# Patient Record
Sex: FEMALE | Race: WHITE | NOT HISPANIC OR LATINO | Employment: PART TIME | URBAN - METROPOLITAN AREA
[De-identification: names, ages, dates, MRNs, and addresses within clinical notes are randomized per-mention and may not be internally consistent; named-entity substitution may affect disease eponyms.]

---

## 2017-02-07 RX ORDER — LISINOPRIL AND HYDROCHLOROTHIAZIDE 12.5; 1 MG/1; MG/1
TABLET ORAL
Qty: 90 TAB | Refills: 0 | Status: SHIPPED | OUTPATIENT
Start: 2017-02-07 | End: 2017-07-07 | Stop reason: SDUPTHER

## 2017-02-07 NOTE — TELEPHONE ENCOUNTER
Was the patient seen in the last year in this department? No     Does patient have an active prescription for medications requested? No     Received Request Via: Pharmacy      Pt met protocol?: No pt last ov 12/2016   BP Readings from Last 1 Encounters:   12/01/16 110/72

## 2017-03-16 ENCOUNTER — HOSPITAL ENCOUNTER (OUTPATIENT)
Dept: LAB | Facility: MEDICAL CENTER | Age: 52
End: 2017-03-16
Attending: SPECIALIST
Payer: MEDICARE

## 2017-03-16 LAB
ALBUMIN SERPL BCP-MCNC: 4.3 G/DL (ref 3.2–4.9)
ALP SERPL-CCNC: 72 U/L (ref 30–99)
ALT SERPL-CCNC: 24 U/L (ref 2–50)
AST SERPL-CCNC: 22 U/L (ref 12–45)
BASOPHILS # BLD AUTO: 0.04 K/UL (ref 0–0.12)
BASOPHILS NFR BLD AUTO: 0.7 % (ref 0–1.8)
BILIRUB CONJ SERPL-MCNC: <0.1 MG/DL (ref 0.1–0.5)
BILIRUB INDIRECT SERPL-MCNC: NORMAL MG/DL (ref 0–1)
BILIRUB SERPL-MCNC: 0.4 MG/DL (ref 0.1–1.5)
EOSINOPHIL # BLD: 0.3 K/UL (ref 0–0.51)
EOSINOPHIL NFR BLD AUTO: 5.3 % (ref 0–6.9)
ERYTHROCYTE [DISTWIDTH] IN BLOOD BY AUTOMATED COUNT: 45.3 FL (ref 35.9–50)
HCT VFR BLD AUTO: 38.5 % (ref 37–47)
HGB BLD-MCNC: 12.8 G/DL (ref 12–16)
IMM GRANULOCYTES # BLD AUTO: 0.01 K/UL (ref 0–0.11)
IMM GRANULOCYTES NFR BLD AUTO: 0.2 % (ref 0–0.9)
LYMPHOCYTES # BLD: 1.3 K/UL (ref 1–4.8)
LYMPHOCYTES NFR BLD AUTO: 22.8 % (ref 22–41)
MCH RBC QN AUTO: 31.2 PG (ref 27–33)
MCHC RBC AUTO-ENTMCNC: 33.2 G/DL (ref 33.6–35)
MCV RBC AUTO: 93.9 FL (ref 81.4–97.8)
MONOCYTES # BLD: 0.38 K/UL (ref 0–0.85)
MONOCYTES NFR BLD AUTO: 6.7 % (ref 0–13.4)
NEUTROPHILS # BLD: 3.67 K/UL (ref 2–7.15)
NEUTROPHILS NFR BLD AUTO: 64.3 % (ref 44–72)
NRBC # BLD AUTO: 0 K/UL
NRBC BLD-RTO: 0 /100 WBC
PLATELET # BLD AUTO: 257 K/UL (ref 164–446)
PMV BLD AUTO: 11 FL (ref 9–12.9)
PROT SERPL-MCNC: 7.1 G/DL (ref 6–8.2)
RBC # BLD AUTO: 4.1 M/UL (ref 4.2–5.4)
WBC # BLD AUTO: 5.7 K/UL (ref 4.8–10.8)

## 2017-03-16 PROCEDURE — 85025 COMPLETE CBC W/AUTO DIFF WBC: CPT

## 2017-03-16 PROCEDURE — 80076 HEPATIC FUNCTION PANEL: CPT

## 2017-03-16 PROCEDURE — 36415 COLL VENOUS BLD VENIPUNCTURE: CPT

## 2017-05-19 ENCOUNTER — HOSPITAL ENCOUNTER (OUTPATIENT)
Dept: LAB | Facility: MEDICAL CENTER | Age: 52
End: 2017-05-19
Attending: SPECIALIST
Payer: MEDICARE

## 2017-05-19 LAB
ALBUMIN SERPL BCP-MCNC: 4.2 G/DL (ref 3.2–4.9)
ALBUMIN/GLOB SERPL: 1.4 G/DL
ALP SERPL-CCNC: 65 U/L (ref 30–99)
ALT SERPL-CCNC: 25 U/L (ref 2–50)
ANION GAP SERPL CALC-SCNC: 6 MMOL/L (ref 0–11.9)
AST SERPL-CCNC: 22 U/L (ref 12–45)
BASOPHILS # BLD AUTO: 0.9 % (ref 0–1.8)
BASOPHILS # BLD: 0.05 K/UL (ref 0–0.12)
BILIRUB SERPL-MCNC: 0.6 MG/DL (ref 0.1–1.5)
BUN SERPL-MCNC: 18 MG/DL (ref 8–22)
CALCIUM SERPL-MCNC: 9.2 MG/DL (ref 8.5–10.5)
CHLORIDE SERPL-SCNC: 104 MMOL/L (ref 96–112)
CHOLEST SERPL-MCNC: 209 MG/DL (ref 100–199)
CO2 SERPL-SCNC: 29 MMOL/L (ref 20–33)
CREAT SERPL-MCNC: 0.85 MG/DL (ref 0.5–1.4)
EOSINOPHIL # BLD AUTO: 0.38 K/UL (ref 0–0.51)
EOSINOPHIL NFR BLD: 6.8 % (ref 0–6.9)
ERYTHROCYTE [DISTWIDTH] IN BLOOD BY AUTOMATED COUNT: 44.1 FL (ref 35.9–50)
FERRITIN SERPL-MCNC: 51 NG/ML (ref 10–291)
GFR SERPL CREATININE-BSD FRML MDRD: >60 ML/MIN/1.73 M 2
GLOBULIN SER CALC-MCNC: 3 G/DL (ref 1.9–3.5)
GLUCOSE SERPL-MCNC: 105 MG/DL (ref 65–99)
HCT VFR BLD AUTO: 38 % (ref 37–47)
HDLC SERPL-MCNC: 85 MG/DL
HGB BLD-MCNC: 12.7 G/DL (ref 12–16)
IMM GRANULOCYTES # BLD AUTO: 0.01 K/UL (ref 0–0.11)
IMM GRANULOCYTES NFR BLD AUTO: 0.2 % (ref 0–0.9)
IRON SERPL-MCNC: 101 UG/DL (ref 40–170)
LDLC SERPL CALC-MCNC: 103 MG/DL
LYMPHOCYTES # BLD AUTO: 1.02 K/UL (ref 1–4.8)
LYMPHOCYTES NFR BLD: 18.2 % (ref 22–41)
MCH RBC QN AUTO: 31.4 PG (ref 27–33)
MCHC RBC AUTO-ENTMCNC: 33.4 G/DL (ref 33.6–35)
MCV RBC AUTO: 94.1 FL (ref 81.4–97.8)
MONOCYTES # BLD AUTO: 0.47 K/UL (ref 0–0.85)
MONOCYTES NFR BLD AUTO: 8.4 % (ref 0–13.4)
NEUTROPHILS # BLD AUTO: 3.68 K/UL (ref 2–7.15)
NEUTROPHILS NFR BLD: 65.5 % (ref 44–72)
NRBC # BLD AUTO: 0 K/UL
NRBC BLD AUTO-RTO: 0 /100 WBC
PLATELET # BLD AUTO: 228 K/UL (ref 164–446)
PMV BLD AUTO: 11.5 FL (ref 9–12.9)
POTASSIUM SERPL-SCNC: 3.7 MMOL/L (ref 3.6–5.5)
PROT SERPL-MCNC: 7.2 G/DL (ref 6–8.2)
RBC # BLD AUTO: 4.04 M/UL (ref 4.2–5.4)
SODIUM SERPL-SCNC: 139 MMOL/L (ref 135–145)
TRIGL SERPL-MCNC: 103 MG/DL (ref 0–149)
WBC # BLD AUTO: 5.6 K/UL (ref 4.8–10.8)

## 2017-05-19 PROCEDURE — 80061 LIPID PANEL: CPT

## 2017-05-19 PROCEDURE — 82728 ASSAY OF FERRITIN: CPT

## 2017-05-19 PROCEDURE — 85025 COMPLETE CBC W/AUTO DIFF WBC: CPT

## 2017-05-19 PROCEDURE — 80053 COMPREHEN METABOLIC PANEL: CPT

## 2017-05-19 PROCEDURE — 83540 ASSAY OF IRON: CPT

## 2017-05-19 PROCEDURE — 36415 COLL VENOUS BLD VENIPUNCTURE: CPT

## 2017-06-09 ENCOUNTER — PATIENT MESSAGE (OUTPATIENT)
Dept: MEDICAL GROUP | Facility: PHYSICIAN GROUP | Age: 52
End: 2017-06-09

## 2017-06-20 RX ORDER — LEVOTHYROXINE SODIUM 0.12 MG/1
TABLET ORAL
Qty: 90 TAB | Refills: 0 | Status: SHIPPED | OUTPATIENT
Start: 2017-06-20 | End: 2017-09-23 | Stop reason: SDUPTHER

## 2017-07-07 RX ORDER — LISINOPRIL AND HYDROCHLOROTHIAZIDE 12.5; 1 MG/1; MG/1
TABLET ORAL
Qty: 90 TAB | Refills: 0 | Status: SHIPPED | OUTPATIENT
Start: 2017-07-07 | End: 2017-09-06 | Stop reason: SDUPTHER

## 2017-07-07 NOTE — TELEPHONE ENCOUNTER
Was the patient seen in the last year in this department? Yes 12/01/16    Does patient have an active prescription for medications requested? No     Received Request Via: Pharmacy

## 2017-07-24 ENCOUNTER — HOSPITAL ENCOUNTER (OUTPATIENT)
Dept: LAB | Facility: MEDICAL CENTER | Age: 52
End: 2017-07-24
Attending: OBSTETRICS & GYNECOLOGY
Payer: MEDICARE

## 2017-07-24 LAB
BASOPHILS # BLD AUTO: 0.4 % (ref 0–1.8)
BASOPHILS # BLD: 0.02 K/UL (ref 0–0.12)
EOSINOPHIL # BLD AUTO: 0.26 K/UL (ref 0–0.51)
EOSINOPHIL NFR BLD: 5.2 % (ref 0–6.9)
ERYTHROCYTE [DISTWIDTH] IN BLOOD BY AUTOMATED COUNT: 43.5 FL (ref 35.9–50)
ESTRADIOL SERPL-MCNC: <20 PG/ML
FSH SERPL-ACNC: 60.7 MIU/ML
HCT VFR BLD AUTO: 38 % (ref 37–47)
HGB BLD-MCNC: 12.4 G/DL (ref 12–16)
IMM GRANULOCYTES # BLD AUTO: 0.03 K/UL (ref 0–0.11)
IMM GRANULOCYTES NFR BLD AUTO: 0.6 % (ref 0–0.9)
LH SERPL-ACNC: 36 IU/L
LYMPHOCYTES # BLD AUTO: 1.01 K/UL (ref 1–4.8)
LYMPHOCYTES NFR BLD: 20.4 % (ref 22–41)
MCH RBC QN AUTO: 30.5 PG (ref 27–33)
MCHC RBC AUTO-ENTMCNC: 32.6 G/DL (ref 33.6–35)
MCV RBC AUTO: 93.6 FL (ref 81.4–97.8)
MONOCYTES # BLD AUTO: 0.46 K/UL (ref 0–0.85)
MONOCYTES NFR BLD AUTO: 9.3 % (ref 0–13.4)
NEUTROPHILS # BLD AUTO: 3.18 K/UL (ref 2–7.15)
NEUTROPHILS NFR BLD: 64.1 % (ref 44–72)
NRBC # BLD AUTO: 0 K/UL
NRBC BLD AUTO-RTO: 0 /100 WBC
PLATELET # BLD AUTO: 236 K/UL (ref 164–446)
PMV BLD AUTO: 11.5 FL (ref 9–12.9)
PROGEST SERPL-MCNC: 0.1 NG/ML
RBC # BLD AUTO: 4.06 M/UL (ref 4.2–5.4)
T4 FREE SERPL-MCNC: 1.07 NG/DL (ref 0.53–1.43)
TSH SERPL DL<=0.005 MIU/L-ACNC: 0.08 UIU/ML (ref 0.3–3.7)
WBC # BLD AUTO: 5 K/UL (ref 4.8–10.8)

## 2017-07-24 PROCEDURE — 84144 ASSAY OF PROGESTERONE: CPT

## 2017-07-24 PROCEDURE — 84270 ASSAY OF SEX HORMONE GLOBUL: CPT

## 2017-07-24 PROCEDURE — 84403 ASSAY OF TOTAL TESTOSTERONE: CPT

## 2017-07-24 PROCEDURE — 83002 ASSAY OF GONADOTROPIN (LH): CPT

## 2017-07-24 PROCEDURE — 82670 ASSAY OF TOTAL ESTRADIOL: CPT

## 2017-07-24 PROCEDURE — 84439 ASSAY OF FREE THYROXINE: CPT

## 2017-07-24 PROCEDURE — 36415 COLL VENOUS BLD VENIPUNCTURE: CPT

## 2017-07-24 PROCEDURE — 84443 ASSAY THYROID STIM HORMONE: CPT

## 2017-07-24 PROCEDURE — 85025 COMPLETE CBC W/AUTO DIFF WBC: CPT

## 2017-07-24 PROCEDURE — 83001 ASSAY OF GONADOTROPIN (FSH): CPT

## 2017-07-27 LAB
SHBG SERPL-SCNC: 29 NMOL/L (ref 30–135)
TESTOST FREE SERPL-MCNC: 2.3 PG/ML (ref 0.6–3.8)
TESTOST SERPL-MCNC: 13 NG/DL (ref 9–55)

## 2017-08-03 ENCOUNTER — PATIENT MESSAGE (OUTPATIENT)
Dept: MEDICAL GROUP | Facility: PHYSICIAN GROUP | Age: 52
End: 2017-08-03

## 2017-08-03 DIAGNOSIS — N95.1 PERIMENOPAUSAL: ICD-10-CM

## 2017-08-31 ENCOUNTER — TELEPHONE (OUTPATIENT)
Dept: MEDICAL GROUP | Facility: PHYSICIAN GROUP | Age: 52
End: 2017-08-31

## 2017-08-31 NOTE — TELEPHONE ENCOUNTER
ESTABLISHED PATIENT PRE-VISIT PLANNING     Note: Patient will not be contacted if there is no indication to call.     1.  Reviewed notes from the last few office visits within the medical group: Yes    2.  If any orders were placed at last visit or intended to be done for this visit (i.e. 6 mos follow-up), do we have Results/Consult Notes?        •  Labs - Labs ordered, completed and results are in chart.       •  Imaging - Imaging was not ordered at last office visit.       •  Referrals - Referral ordered, patient has NOT been seen.    3. Is this appointment scheduled as a Hospital Follow-Up? No    4.  Immunizations were updated in Epic using WebIZ?: Epic matches WebIZ       •  Web Iz Recommendations: FLU and TDAP    5.  Patient is due for the following Health Maintenance Topics:   Health Maintenance Due   Topic Date Due   • Annual Wellness Visit  1965   • IMM DTaP/Tdap/Td Vaccine (1 - Tdap) 03/11/1983   • COLON CANCER SCREENING ANNUAL FIT  03/11/2014   • PAP SMEAR  05/15/2015   • IMM INFLUENZA (1) 09/01/2017           6.  Patient was informed to arrive 15 min prior to their scheduled appointment and bring in their medication bottles.

## 2017-09-01 ENCOUNTER — OFFICE VISIT (OUTPATIENT)
Dept: MEDICAL GROUP | Facility: PHYSICIAN GROUP | Age: 52
End: 2017-09-01
Payer: MEDICARE

## 2017-09-01 VITALS
WEIGHT: 190 LBS | SYSTOLIC BLOOD PRESSURE: 120 MMHG | HEIGHT: 64 IN | BODY MASS INDEX: 32.44 KG/M2 | HEART RATE: 72 BPM | TEMPERATURE: 98.4 F | DIASTOLIC BLOOD PRESSURE: 84 MMHG | RESPIRATION RATE: 16 BRPM | OXYGEN SATURATION: 95 %

## 2017-09-01 DIAGNOSIS — M25.511 RIGHT SHOULDER PAIN, UNSPECIFIED CHRONICITY: ICD-10-CM

## 2017-09-01 DIAGNOSIS — E03.9 ACQUIRED HYPOTHYROIDISM: ICD-10-CM

## 2017-09-01 DIAGNOSIS — N95.0 POST-MENOPAUSAL BLEEDING: ICD-10-CM

## 2017-09-01 DIAGNOSIS — N95.9 MENOPAUSAL DISORDER: ICD-10-CM

## 2017-09-01 DIAGNOSIS — Z12.11 SCREENING FOR COLON CANCER: ICD-10-CM

## 2017-09-01 DIAGNOSIS — E66.9 OBESITY (BMI 30-39.9): ICD-10-CM

## 2017-09-01 PROCEDURE — 99214 OFFICE O/P EST MOD 30 MIN: CPT | Performed by: FAMILY MEDICINE

## 2017-09-01 NOTE — PROGRESS NOTES
Chief Complaint   Patient presents with   • Follow-Up     labs   • Shoulder Pain     rt shoulder pain       HISTORY OF PRESENT ILLNESS: Patient is a 52 y.o. female established patient here today for the following concerns:    1. Right shoulder pain, unspecified chronicity  Abdias is here today with a new complaint of right shoulder pain that started last week after she was helping a neighbor with the ladder. She reports that it shifted and she felt a strain or pop in her shoulder, and since then has had difficulty raising the right arm without pain. She then reports that she's had a couple of movements what she was trying to unscrew apart from her vacuum and developed shoulder pain. Small movements tend to make it worse. She denies any redness or swelling. No numbness or tingling.    2. Acquired hypothyroidism  Patient has history of hypothyroidism, currently on levothyroxine. She reports she's been having increasing hot flashes although she is experiencing menopausal hot flashes as well.  Due for recheck on her labs.    3. Menopausal disorder  6. Post-menopausal bleeding    Patient found to have very low estrogen levels, especially drinking hot flashes and mood swings, insomnia. She would like to talk about options to treat it. Unfortunately she has been undergoing workup for postmenopausal bleeding with thickened endometrium. She failed to establish rapport with her gynecologist, Dr. Wynne and is here to talk about her options. She did have Pap smear which is normal. They were talking about endometrial biopsy after an office transabdominal ultrasound showed thickened endometrial stripe. She reports since her visit she has not had any further bleeding. No pelvic pain    4. Obesity (BMI 30-39.9)  Patient reports she's having some difficulty with weight gain, she has started to exercise a bit more. She is hoping to change her diet to produce some weight loss.    5. Screening for colon cancer  Due for colon cancer  screening          Past Medical, Social, and Family history reviewed and updated in EPIC    Allergies:Review of patient's allergies indicates no known allergies.    Current Outpatient Prescriptions   Medication Sig Dispense Refill   • lisinopril-hydrochlorothiazide (PRINZIDE, ZESTORETIC) 10-12.5 MG per tablet TAKE ONE TABLET BY MOUTH ONCE DAILY 90 Tab 0   • levothyroxine (SYNTHROID) 125 MCG Tab TAKE ONE TABLET BY MOUTH ONCE DAILY 90 Tab 0   • XELJANZ 5 MG Tab Take 1 Tab by mouth 2 Times a Day.  1   • vitamin D (CHOLECALCIFEROL) 1000 UNIT Tab Take 1,000 Units by mouth every day.     • B Complex Vitamins (VITAMIN B COMPLEX) Tab Take  by mouth.     • predniSONE (DELTASONE) 20 MG TABS Take 5 mg by mouth every day. Take with food     • folic acid (FOLVITE) 1 MG TABS Take 1 mg by mouth every day.     • sulfaSALAzine (AZULFIDINE) 500 MG TABS Take 1,000 mg by mouth 2 times a day. 1500 mg in AM and 1000 mg in PM     • FLUVIRIN Suspension      • methotrexate (TREXALL) 2.5 MG TABS Take 17.5 mg by mouth every 7 days.     • Naproxen Sodium (ALEVE) 220 MG CAPS Take  by mouth.     • hydroxychloroquine (PLAQUINIL) 200 MG TABS Take 400 mg by mouth every day.       No current facility-administered medications for this visit.          ROS:  Review of Systems   Constitutional: Negative for fever, chills, weight loss and malaise/fatigue.   HENT: Negative for ear pain, nosebleeds, congestion, sore throat and neck pain.    Eyes: Negative for blurred vision.   Respiratory: Negative for cough, sputum production, shortness of breath and wheezing.    Cardiovascular: Negative for chest pain, palpitations,  and leg swelling.   Gastrointestinal: Negative for heartburn, nausea, vomiting, diarrhea and abdominal pain.   Genitourinary: Negative for dysuria, urgency and frequency.   Musculoskeletal: Negative for myalgias, back pain and joint pain.   Skin: Negative for rash and itching.   Neurological: Negative for dizziness, tingling, tremors,  "sensory change, focal weakness and headaches.   Endo/Heme/Allergies: Does not bruise/bleed easily.   Psychiatric/Behavioral: Negative for depression, anxiety, suicidal ideas, insomnia and memory loss.      Exam:  Blood pressure 120/84, pulse 72, temperature 36.9 °C (98.4 °F), resp. rate 16, height 1.626 m (5' 4.02\"), weight 86.2 kg (190 lb), SpO2 95 %.    General:  Well nourished, well developed in NAD  Head is grossly normal.  Neck: Supple without JVD   Pulmonary:  Normal effort.   Cardiovascular: Regular rate  Extremities: no clubbing, cyanosis, or edema.  Psych: affect appropriate  MSK: Right shoulder with painful range of motion, hesitancy on lowering the shoulder. Unable to get full exam on rotator cuff due to pain.    Please note that this dictation was created using voice recognition software. I have made every reasonable attempt to correct obvious errors, but I expect that there are errors of grammar and possibly content that I did not discover before finalizing the note.    Assessment/Plan:  1. Right shoulder pain, unspecified chronicity  Suspect rotator cuff impingement, strain of the rotator cuff or microtear. At this time she will ice use nonsteroidal anti-inflammatory medications as tolerated, home exercise rehab given. If not improving will obtain MRI to evaluate the rotator cuff.    2. Acquired hypothyroidism  Continue current dose check levels  - TSH WITH REFLEX TO FT4; Future    3. Menopausal disorder  Discussed risks and benefits and options for treatment of postmenopausal symptoms. At this time would recommend something such as brisdelle or low-dose Paxil.    4. Obesity (BMI 30-39.9)  - Patient identified as having weight management issue.  Appropriate orders and counseling given.    5. Screening for colon cancer  - OCCULT BLOOD FECES IMMUNOASSAY (FIT); Future    6. Post-menopausal bleeding  Repeat transvaginal ultrasound, if endometrial stripe is still thickened, suggest we do either endometrial " biopsy or D&C and will require new referral to gynecology.  - US-PELVIC TRANSABDOMINAL; Future    Follow-up in one month sooner when necessary

## 2017-09-01 NOTE — LETTER
[facility] include [facility] beds at the [facility]  Phoebe Lancaster M.D.  [address]  [place] [address]  Fax: [phone]   Authorization for Release/Disclosure of   Protected Health Information   Name: RUBINA HORTON : 1965 SSN: [id number]   Address: [address] [place] [address]  [place] [address] Phone:    [phone] (home)    I authorize the entity listed below to release/disclose the PHI below to:   [facility]/Phoebe Lancaster M.D. and Phoebe Lancaster M.D.   Provider or Entity Name:Maria Ines Wynne   Address   [facility]   Phone:[phone]    Fax:[phone]     Reason for request: continuity of care   Information to be released:    [  ] LAST COLONOSCOPY,  including any PATH REPORT and follow-up  [  ] LAST FIT/COLOGUARD RESULT [  ] LAST DEXA  [  ] LAST MAMMOGRAM  [ X ] LAST PAP  [  ] LAST LABS [  ] RETINA EXAM REPORT  [  ] IMMUNIZATION RECORDS  [  ] Release all info          DATES OF SERVICE OR TIME PERIOD TO BE DISCLOSED: _____________  I understand and acknowledge that:  * This Authorization may be revoked at any time by you in writing, except if your health information has already been used or disclosed.  * Your health information that will be used or disclosed as a result of you signing this authorization could be re-disclosed by the recipient. If this occurs, your re-disclosed health information may no longer be protected by State or Federal laws.  * You may refuse to sign this Authorization. Your refusal will not affect your ability to obtain treatment.  * This Authorization becomes effective upon signing and will  on (date) __________.      If no date is indicated, this Authorization will  one (1) year from the signature date.    Name: Rubina Horton       Date: 2017       PLEASE FAX REQUESTED RECORDS BACK TO: [phone]

## 2017-09-06 RX ORDER — LISINOPRIL AND HYDROCHLOROTHIAZIDE 12.5; 1 MG/1; MG/1
TABLET ORAL
Qty: 90 TAB | Refills: 0 | Status: SHIPPED | OUTPATIENT
Start: 2017-09-06 | End: 2017-12-03 | Stop reason: SDUPTHER

## 2017-09-08 ENCOUNTER — TELEPHONE (OUTPATIENT)
Dept: MEDICAL GROUP | Facility: PHYSICIAN GROUP | Age: 52
End: 2017-09-08

## 2017-09-08 ENCOUNTER — HOSPITAL ENCOUNTER (OUTPATIENT)
Dept: RADIOLOGY | Facility: MEDICAL CENTER | Age: 52
End: 2017-09-08
Attending: FAMILY MEDICINE
Payer: MEDICARE

## 2017-09-08 DIAGNOSIS — N95.9 MENOPAUSAL DISORDER: ICD-10-CM

## 2017-09-08 DIAGNOSIS — N95.0 POST-MENOPAUSAL BLEEDING: ICD-10-CM

## 2017-09-08 PROCEDURE — 76856 US EXAM PELVIC COMPLETE: CPT

## 2017-09-08 RX ORDER — PAROXETINE 7.5 MG/1
1 CAPSULE ORAL DAILY
Qty: 90 CAP | Refills: 3 | Status: SHIPPED | OUTPATIENT
Start: 2017-09-08 | End: 2018-03-26

## 2017-09-08 NOTE — TELEPHONE ENCOUNTER
1. Caller Name: Abdias SrRupalAnamariajs                                           Call Back Number: [phone] (home)         Patient approves a detailed voicemail message: N\A    Pt states at her last appointment medication for menopause was discussed.  She is asking for rx sent to [facility] if possible.

## 2017-09-11 ENCOUNTER — TELEPHONE (OUTPATIENT)
Dept: MEDICAL GROUP | Facility: PHYSICIAN GROUP | Age: 52
End: 2017-09-11

## 2017-09-11 RX ORDER — PAROXETINE 10 MG/1
10 TABLET, FILM COATED ORAL DAILY
Qty: 90 TAB | Refills: 3 | Status: SHIPPED | OUTPATIENT
Start: 2017-09-11 | End: 2018-04-25

## 2017-09-11 NOTE — TELEPHONE ENCOUNTER
----- Message from Phoebe Lancaster M.D. sent at 9/10/2017  8:43 PM PDT -----  The uterine lining is thick.  We need consultation with gynecology for possible biopsy and/or dilation and curettage.  Referral had already been submitted, please call to schedule consultation with gynecology.

## 2017-09-12 ENCOUNTER — HOSPITAL ENCOUNTER (OUTPATIENT)
Dept: LAB | Facility: MEDICAL CENTER | Age: 52
End: 2017-09-12
Attending: FAMILY MEDICINE
Payer: MEDICARE

## 2017-09-12 ENCOUNTER — HOSPITAL ENCOUNTER (OUTPATIENT)
Dept: LAB | Facility: MEDICAL CENTER | Age: 52
End: 2017-09-12
Attending: SPECIALIST
Payer: MEDICARE

## 2017-09-12 DIAGNOSIS — E03.9 ACQUIRED HYPOTHYROIDISM: ICD-10-CM

## 2017-09-12 LAB
ALBUMIN SERPL BCP-MCNC: 4.3 G/DL (ref 3.2–4.9)
ALBUMIN/GLOB SERPL: 1.4 G/DL
ALP SERPL-CCNC: 69 U/L (ref 30–99)
ALT SERPL-CCNC: 24 U/L (ref 2–50)
ANION GAP SERPL CALC-SCNC: 10 MMOL/L (ref 0–11.9)
AST SERPL-CCNC: 19 U/L (ref 12–45)
BASOPHILS # BLD AUTO: 0.5 % (ref 0–1.8)
BASOPHILS # BLD: 0.03 K/UL (ref 0–0.12)
BILIRUB SERPL-MCNC: 0.7 MG/DL (ref 0.1–1.5)
BUN SERPL-MCNC: 15 MG/DL (ref 8–22)
CALCIUM SERPL-MCNC: 10 MG/DL (ref 8.5–10.5)
CHLORIDE SERPL-SCNC: 104 MMOL/L (ref 96–112)
CO2 SERPL-SCNC: 26 MMOL/L (ref 20–33)
CREAT SERPL-MCNC: 0.82 MG/DL (ref 0.5–1.4)
EOSINOPHIL # BLD AUTO: 0.37 K/UL (ref 0–0.51)
EOSINOPHIL NFR BLD: 6.7 % (ref 0–6.9)
ERYTHROCYTE [DISTWIDTH] IN BLOOD BY AUTOMATED COUNT: 43.5 FL (ref 35.9–50)
EST. AVERAGE GLUCOSE BLD GHB EST-MCNC: 105 MG/DL
GFR SERPL CREATININE-BSD FRML MDRD: >60 ML/MIN/1.73 M 2
GLOBULIN SER CALC-MCNC: 3.1 G/DL (ref 1.9–3.5)
GLUCOSE SERPL-MCNC: 103 MG/DL (ref 65–99)
HBA1C MFR BLD: 5.3 % (ref 0–5.6)
HCT VFR BLD AUTO: 39 % (ref 37–47)
HGB BLD-MCNC: 13.1 G/DL (ref 12–16)
IMM GRANULOCYTES # BLD AUTO: 0.02 K/UL (ref 0–0.11)
IMM GRANULOCYTES NFR BLD AUTO: 0.4 % (ref 0–0.9)
LYMPHOCYTES # BLD AUTO: 0.72 K/UL (ref 1–4.8)
LYMPHOCYTES NFR BLD: 12.9 % (ref 22–41)
MCH RBC QN AUTO: 31.1 PG (ref 27–33)
MCHC RBC AUTO-ENTMCNC: 33.6 G/DL (ref 33.6–35)
MCV RBC AUTO: 92.6 FL (ref 81.4–97.8)
MONOCYTES # BLD AUTO: 0.47 K/UL (ref 0–0.85)
MONOCYTES NFR BLD AUTO: 8.5 % (ref 0–13.4)
NEUTROPHILS # BLD AUTO: 3.95 K/UL (ref 2–7.15)
NEUTROPHILS NFR BLD: 71 % (ref 44–72)
NRBC # BLD AUTO: 0 K/UL
NRBC BLD AUTO-RTO: 0 /100 WBC
PLATELET # BLD AUTO: 237 K/UL (ref 164–446)
PMV BLD AUTO: 11.3 FL (ref 9–12.9)
POTASSIUM SERPL-SCNC: 3.9 MMOL/L (ref 3.6–5.5)
PROT SERPL-MCNC: 7.4 G/DL (ref 6–8.2)
RBC # BLD AUTO: 4.21 M/UL (ref 4.2–5.4)
SODIUM SERPL-SCNC: 140 MMOL/L (ref 135–145)
TSH SERPL DL<=0.005 MIU/L-ACNC: 0.44 UIU/ML (ref 0.3–3.7)
WBC # BLD AUTO: 5.6 K/UL (ref 4.8–10.8)

## 2017-09-12 PROCEDURE — 83036 HEMOGLOBIN GLYCOSYLATED A1C: CPT

## 2017-09-12 PROCEDURE — 84443 ASSAY THYROID STIM HORMONE: CPT

## 2017-09-12 PROCEDURE — 85025 COMPLETE CBC W/AUTO DIFF WBC: CPT

## 2017-09-12 PROCEDURE — 80053 COMPREHEN METABOLIC PANEL: CPT

## 2017-09-12 PROCEDURE — 36415 COLL VENOUS BLD VENIPUNCTURE: CPT

## 2017-09-15 ENCOUNTER — OFFICE VISIT (OUTPATIENT)
Dept: MEDICAL GROUP | Facility: PHYSICIAN GROUP | Age: 52
End: 2017-09-15
Payer: MEDICARE

## 2017-09-15 VITALS
TEMPERATURE: 97.9 F | RESPIRATION RATE: 16 BRPM | WEIGHT: 191 LBS | HEART RATE: 64 BPM | HEIGHT: 64 IN | SYSTOLIC BLOOD PRESSURE: 128 MMHG | BODY MASS INDEX: 32.61 KG/M2 | DIASTOLIC BLOOD PRESSURE: 86 MMHG | OXYGEN SATURATION: 95 %

## 2017-09-15 DIAGNOSIS — E03.9 HYPOTHYROIDISM (ACQUIRED): ICD-10-CM

## 2017-09-15 DIAGNOSIS — N95.9 POSTMENOPAUSAL SYMPTOMS: ICD-10-CM

## 2017-09-15 DIAGNOSIS — N85.00 ENDOMETRIAL HYPERPLASIA: ICD-10-CM

## 2017-09-15 PROCEDURE — 99214 OFFICE O/P EST MOD 30 MIN: CPT | Performed by: FAMILY MEDICINE

## 2017-09-15 NOTE — PROGRESS NOTES
Chief Complaint   Patient presents with   • Follow-Up     labs, ultrasound   • Medication Management     paroxetine       HISTORY OF PRESENT ILLNESS: Patient is a 52 y.o. female established patient here today for the following concerns:    Patient is here with menopausal symptoms of hot flashes and mood swings, night sweats and trouble concentrating. She also has history of endometrial hyperplasia and is here with her partner to discuss whether she needs additional workup. It is been our recommendation that she move forward with evaluation gynecology for possible dilation and curettage versus endometrial biopsy. She is highly hesitant to have any sort of procedure done. She is here today to discuss the reasons why this might be necessary. She denies any pelvic pains, no abnormal bleeding. Her endometrial stripe did go from 0.77-1.0 in the course of several weeks although this was done by different ultrasonographer's and different techniques. She has yet to start the Paxil for postmenopausal symptoms, and would like to discuss this further as well.      She did also have some additional lab testing done which revealed that her thyroid is now euthyroid with her current dosing.       Past Medical, Social, and Family history reviewed and updated in EPIC    Allergies:Review of patient's allergies indicates no known allergies.    Current Outpatient Prescriptions   Medication Sig Dispense Refill   • paroxetine (PAXIL) 10 MG Tab Take 1 Tab by mouth every day. 90 Tab 3   • PARoxetine Mesylate (BRISDELLE) 7.5 MG Cap Take 1 Tab by mouth every day. 90 Cap 3   • lisinopril-hydrochlorothiazide (PRINZIDE, ZESTORETIC) 10-12.5 MG per tablet TAKE ONE TABLET BY MOUTH ONCE DAILY 90 Tab 0   • levothyroxine (SYNTHROID) 125 MCG Tab TAKE ONE TABLET BY MOUTH ONCE DAILY 90 Tab 0   • FLUVIRIN Suspension      • XELJANZ 5 MG Tab Take 1 Tab by mouth 2 Times a Day.  1   • vitamin D (CHOLECALCIFEROL) 1000 UNIT Tab Take 1,000 Units by mouth every  "day.     • B Complex Vitamins (VITAMIN B COMPLEX) Tab Take  by mouth.     • methotrexate (TREXALL) 2.5 MG TABS Take 17.5 mg by mouth every 7 days.     • Naproxen Sodium (ALEVE) 220 MG CAPS Take  by mouth.     • predniSONE (DELTASONE) 20 MG TABS Take 5 mg by mouth every day. Take with food     • folic acid (FOLVITE) 1 MG TABS Take 1 mg by mouth every day.     • sulfaSALAzine (AZULFIDINE) 500 MG TABS Take 1,000 mg by mouth 2 times a day. 1500 mg in AM and 1000 mg in PM     • hydroxychloroquine (PLAQUINIL) 200 MG TABS Take 400 mg by mouth every day.       No current facility-administered medications for this visit.          ROS:  Review of Systems   Constitutional: Negative for fever, chills, weight loss and malaise/fatigue.   HENT: Negative for ear pain, nosebleeds, congestion, sore throat and neck pain.    Eyes: Negative for blurred vision.   Respiratory: Negative for cough, sputum production, shortness of breath and wheezing.    Cardiovascular: Negative for chest pain, palpitations,  and leg swelling.   Gastrointestinal: Negative for heartburn, nausea, vomiting, diarrhea and abdominal pain.   Genitourinary: Negative for dysuria, urgency and frequency.   Musculoskeletal: Negative for myalgias, back pain and joint pain.   Skin: Negative for rash and itching.   Neurological: Negative for dizziness, tingling, tremors, sensory change, focal weakness and headaches.   Endo/Heme/Allergies: Does not bruise/bleed easily.   Psychiatric/Behavioral: Negative for depression, anxiety, suicidal ideas, insomnia and memory loss.      Exam:  Blood pressure 128/86, pulse 64, temperature 36.6 °C (97.9 °F), resp. rate 16, height 1.626 m (5' 4.02\"), weight 86.6 kg (191 lb), SpO2 95 %.    General:  Well nourished, well developed in NAD  Head is grossly normal.  Neck: Supple without JVD   Pulmonary:  Normal effort.   Cardiovascular: Regular rate  Extremities: no clubbing, cyanosis, or edema.  Psych: affect appropriate    Please note that " this dictation was created using voice recognition software. I have made every reasonable attempt to correct obvious errors, but I expect that there are errors of grammar and possibly content that I did not discover before finalizing the note.    Assessment/Plan:  1. Endometrial hyperplasia  Patient needs additional workup to rule out malignancy, referral someplace to gynecology. Discussion today on why we feel that this is important. At this time she is amendable to at least have a consultation to discuss options for treatment.  2. Postmenopausal vasomotor motor symptoms  Discussed at this time that we are unable to use any hormones until we figure out what is going on with her uterine lining. For the time being given her significantly dysfunction with the hormonal changes we will have her start Paxil 10 mg daily.  3. Hypothyroidism  Continue current dosing recheck labs in 3-6 months

## 2017-09-25 RX ORDER — LEVOTHYROXINE SODIUM 0.12 MG/1
TABLET ORAL
Qty: 90 TAB | Refills: 1 | Status: SHIPPED | OUTPATIENT
Start: 2017-09-25 | End: 2017-12-30 | Stop reason: SDUPTHER

## 2017-09-26 ENCOUNTER — OFFICE VISIT (OUTPATIENT)
Dept: MEDICAL GROUP | Facility: CLINIC | Age: 52
End: 2017-09-26
Payer: MEDICARE

## 2017-09-26 VITALS
HEIGHT: 64 IN | BODY MASS INDEX: 32.44 KG/M2 | TEMPERATURE: 98 F | HEART RATE: 78 BPM | OXYGEN SATURATION: 95 % | RESPIRATION RATE: 16 BRPM | WEIGHT: 190 LBS | SYSTOLIC BLOOD PRESSURE: 108 MMHG | DIASTOLIC BLOOD PRESSURE: 68 MMHG

## 2017-09-26 DIAGNOSIS — M25.511 ACUTE PAIN OF RIGHT SHOULDER: ICD-10-CM

## 2017-09-26 PROCEDURE — 99213 OFFICE O/P EST LOW 20 MIN: CPT | Performed by: NURSE PRACTITIONER

## 2017-09-26 ASSESSMENT — ENCOUNTER SYMPTOMS
FALLS: 0
SENSORY CHANGE: 0
TINGLING: 0

## 2017-09-26 ASSESSMENT — PATIENT HEALTH QUESTIONNAIRE - PHQ9: CLINICAL INTERPRETATION OF PHQ2 SCORE: 0

## 2017-09-26 NOTE — PROGRESS NOTES
Chief Complaint   Patient presents with   • Shoulder Pain     Right shoulder x 1 month /       HISTORY OF PRESENT ILLNESS: Patient is a 52 y.o. female established patient who presents today due to one day hx of acute right shoulder pain. Pt reports that she hurts herself about a month ago from helping neighbor moving the ladder. After that, she went to her PCP for check up and was instructed to do some arm swing exercise and prn NSAID. Her symptoms does improve afterward until yesterday that she was scared by her cat so that she have abruptly fast arm rasing movement and she feels the pain again. She started using ice packing and she did take aleve, both of which have been helping her. She is able to raise her arm, normal passive ROM even though that is hurting. She has negative empty an test but it hurts.       Patient Active Problem List    Diagnosis Date Noted   • Endometrial hyperplasia 09/15/2017   • Menopausal disorder 09/01/2017   • Obesity (BMI 30-39.9) 12/01/2016   • Anxiety 08/07/2013   • Hypertension 09/22/2010   • Rheumatoid arthritis ([facility]) 05/27/2010   • Hypothyroid 05/27/2010       Allergies:Review of patient's allergies indicates no known allergies.    Current Outpatient Prescriptions   Medication Sig Dispense Refill   • levothyroxine (SYNTHROID) 125 MCG Tab TAKE ONE TABLET BY MOUTH ONCE DAILY 90 Tab 1   • paroxetine (PAXIL) 10 MG Tab Take 1 Tab by mouth every day. 90 Tab 3   • lisinopril-hydrochlorothiazide (PRINZIDE, ZESTORETIC) 10-12.5 MG per tablet TAKE ONE TABLET BY MOUTH ONCE DAILY 90 Tab 0   • XELJANZ 5 MG Tab Take 1 Tab by mouth 2 Times a Day.  1   • vitamin D (CHOLECALCIFEROL) 1000 UNIT Tab Take 1,000 Units by mouth every day.     • B Complex Vitamins (VITAMIN B COMPLEX) Tab Take  by mouth.     • Naproxen Sodium (ALEVE) 220 MG CAPS Take  by mouth.     • predniSONE (DELTASONE) 20 MG TABS Take 5 mg by mouth every day. Take with food     • folic acid (FOLVITE) 1 MG TABS Take 1 mg by mouth  "every day.     • sulfaSALAzine (AZULFIDINE) 500 MG TABS Take 1,000 mg by mouth 2 times a day. 1500 mg in AM and 1000 mg in PM     • PARoxetine Mesylate (BRISDELLE) 7.5 MG Cap Take 1 Tab by mouth every day. 90 Cap 3   • FLUVIRIN Suspension      • methotrexate (TREXALL) 2.5 MG TABS Take 17.5 mg by mouth every 7 days.     • hydroxychloroquine (PLAQUINIL) 200 MG TABS Take 400 mg by mouth every day.       No current facility-administered medications for this visit.        Social History   Substance Use Topics   • Smoking status: Never Smoker   • Smokeless tobacco: Never Used   • Alcohol use No      Comment: 1-2 a month       Family Status   Relation Status   • Mother Alive   • Father     not close with father   • Maternal Grandmother    • Maternal Grandfather    • Neg Hx      Family History   Problem Relation Age of Onset   • Hypertension Mother    • Thyroid Mother    • Heart Disease Maternal Grandmother    • Heart Disease Maternal Grandfather    • Diabetes Neg Hx    • Cancer Neg Hx          ROS:  Review of Systems   Musculoskeletal: Positive for joint pain ( shoulder pain). Negative for falls.   Neurological: Negative for tingling and sensory change.        Objective:     Blood pressure 108/68, pulse 78, temperature 36.7 °C (98 °F), resp. rate 16, height 1.626 m (5' 4\"), weight 86.2 kg (190 lb), SpO2 95 %.     Physical Exam:  Physical Exam   Constitutional: She is oriented to person, place, and time and well-developed, well-nourished, and in no distress.   HENT:   Head: Normocephalic and atraumatic.   Eyes: Conjunctivae are normal.   Neck: Neck supple. No JVD present.   Cardiovascular: Normal rate.    Pulmonary/Chest: No respiratory distress.   Abdominal: Soft.   Musculoskeletal: She exhibits tenderness ( supraspinatus to bicep tendon area). She exhibits no edema.   Neurological: She is alert and oriented to person, place, and time.   Skin: Skin is warm.   Vitals reviewed.        Assessment/Plan:  1. Acute pain of right " shoulder  - tendonitis, partial rotator cuff tear vs. Impingement. Since ice packing and Aleve are working and negative empty cane test at this time, will continue medical treatment first. However, since this is her second time having injury to the same place, will consider MRI if pt has not felt better in next 7-14 days.    Differential diagnoses and indications for immediate follow-up discussed with patient.    Instructed to return to clinic or nearest emergency department for any change in condition, further concerns, or worsening of symptoms.    The patient demonstrated a good understanding and agreed with the treatment plan.

## 2017-12-05 RX ORDER — LISINOPRIL AND HYDROCHLOROTHIAZIDE 12.5; 1 MG/1; MG/1
1 TABLET ORAL DAILY
Qty: 90 TAB | Refills: 1 | Status: SHIPPED | OUTPATIENT
Start: 2017-12-05 | End: 2018-07-08 | Stop reason: SDUPTHER

## 2017-12-05 NOTE — TELEPHONE ENCOUNTER
Was the patient seen in the last year in this department? Yes     Does patient have an active prescription for medications requested? No     Received Request Via: Patient      Pt met protocol?: Yes, OV 9/17   BP Readings from Last 1 Encounters:   09/26/17 108/68

## 2017-12-05 NOTE — TELEPHONE ENCOUNTER
Refill X 6 months, sent to pharmacy.Pt. Seen in the last 6 months per protocol.   Lab Results   Component Value Date/Time    SODIUM 140 09/12/2017 08:25 AM    POTASSIUM 3.9 09/12/2017 08:25 AM    CHLORIDE 104 09/12/2017 08:25 AM    CO2 26 09/12/2017 08:25 AM    GLUCOSE 103 (H) 09/12/2017 08:25 AM    BUN 15 09/12/2017 08:25 AM    CREATININE 0.82 09/12/2017 08:25 AM

## 2018-01-02 ENCOUNTER — GYNECOLOGY VISIT (OUTPATIENT)
Dept: OBGYN | Facility: MEDICAL CENTER | Age: 53
End: 2018-01-02
Payer: MEDICARE

## 2018-01-02 VITALS
SYSTOLIC BLOOD PRESSURE: 140 MMHG | WEIGHT: 189 LBS | BODY MASS INDEX: 32.27 KG/M2 | DIASTOLIC BLOOD PRESSURE: 80 MMHG | HEIGHT: 64 IN

## 2018-01-02 DIAGNOSIS — N95.0 PMB (POSTMENOPAUSAL BLEEDING): ICD-10-CM

## 2018-01-02 PROCEDURE — 99214 OFFICE O/P EST MOD 30 MIN: CPT | Performed by: OBSTETRICS & GYNECOLOGY

## 2018-01-02 RX ORDER — LEVOTHYROXINE SODIUM 0.12 MG/1
TABLET ORAL
Qty: 90 TAB | Refills: 2 | Status: SHIPPED | OUTPATIENT
Start: 2018-01-02 | End: 2018-05-24 | Stop reason: SDUPTHER

## 2018-01-02 NOTE — PROGRESS NOTES
"Chief Complaint   Patient presents with   • Other     NEW GYN CONSULT       History of present illness:   52 y.o. presents with postmenopausal bleeding  She reports that her periods had been regular every month until her late 40s  She had no periods x 8-9 months until she started spotting in March 2017 then bled again in Sept 2017 x 3 weeks. She had a TVS, which she hardly tolerated that showed ES 1.07 cm  Denies abnormal pap, no vaginal dryness  No pelvic pains. (+) female partner  Occasional EMILY - doesn't bother her  G0, never smoker  Denies family history of breast/ovarian cancer    Review of systems:  Pertinent positives documented in HPI and all other systems reviewed & are negative      All PMH, PSH, allergies, social history and FH reviewed and updated today:  Past Medical History:   Diagnosis Date   • Arthritis    • Hypertension 9/22/2010   • Hypothyroid 5/27/2010       Past Surgical History:   Procedure Laterality Date   • TUMOR EXCISION WITH BIOPSY  2005    R thigh; benign   • OTHER ORTHOPEDIC SURGERY  1990    L ankle        Allergies: No Known Allergies    Social History     Social History   • Marital status: Single     Spouse name: N/A   • Number of children: N/A   • Years of education: N/A     Occupational History   • Not on file.     Social History Main Topics   • Smoking status: Never Smoker   • Smokeless tobacco: Never Used   • Alcohol use No      Comment: 1-2 a month   • Drug use:      Types: Marijuana   • Sexual activity: Not Currently     Partners: Female      Comment: works inside [employer]      Other Topics Concern   • Not on file     Social History Narrative   • No narrative on file       Family History   Problem Relation Age of Onset   • Hypertension Mother    • Thyroid Mother    • Heart Disease Maternal Grandmother    • Heart Disease Maternal Grandfather    • Diabetes Neg Hx    • Cancer Neg Hx        Physical exam:  Blood pressure 140/80, height 1.626 m (5' 4\"), weight 85.7 kg (189 lb), last " menstrual period 12/09/2018, not currently breastfeeding.    General:appears stated age, is in no apparent distress, is well developed and well nourished  Psychiatric: Patient shows appropriate affect, is alert and oriented x3, intact judgment and insight.  1. PMB (postmenopausal bleeding)  REFERRAL TO GYNECOLOGY   2.  Pt explained about PMB and thickened ES, her risk factors associated being G0 and increased BMI for endometrial hyperplasia/endometrial cancer. Recommend hysteroscopy D&C. Pt could not tolerate office gyn procedure.  3. All questions addressed with Rachid on speaker phone - plan explained  4. Spent  25 minutes in face-to-face patient contact in which greater than 50% of that visit was spent in counseling/coordination of care

## 2018-01-05 ENCOUNTER — APPOINTMENT (OUTPATIENT)
Dept: PHYSICAL THERAPY | Facility: REHABILITATION | Age: 53
End: 2018-01-05
Attending: SPECIALIST
Payer: MEDICARE

## 2018-01-10 ENCOUNTER — APPOINTMENT (OUTPATIENT)
Dept: PHYSICAL THERAPY | Facility: REHABILITATION | Age: 53
End: 2018-01-10
Attending: SPECIALIST
Payer: MEDICARE

## 2018-01-12 ENCOUNTER — APPOINTMENT (OUTPATIENT)
Dept: PHYSICAL THERAPY | Facility: REHABILITATION | Age: 53
End: 2018-01-12
Attending: SPECIALIST
Payer: MEDICARE

## 2018-01-12 ENCOUNTER — HOSPITAL ENCOUNTER (OUTPATIENT)
Dept: LAB | Facility: MEDICAL CENTER | Age: 53
End: 2018-01-12
Attending: SPECIALIST
Payer: MEDICARE

## 2018-01-12 LAB
25(OH)D3 SERPL-MCNC: 29 NG/ML (ref 30–100)
ALBUMIN SERPL BCP-MCNC: 4.4 G/DL (ref 3.2–4.9)
ALP SERPL-CCNC: 83 U/L (ref 30–99)
ALT SERPL-CCNC: 25 U/L (ref 2–50)
AST SERPL-CCNC: 20 U/L (ref 12–45)
BASOPHILS # BLD AUTO: 0.8 % (ref 0–1.8)
BASOPHILS # BLD: 0.04 K/UL (ref 0–0.12)
BILIRUB CONJ SERPL-MCNC: 0.1 MG/DL (ref 0.1–0.5)
BILIRUB INDIRECT SERPL-MCNC: 0.5 MG/DL (ref 0–1)
BILIRUB SERPL-MCNC: 0.6 MG/DL (ref 0.1–1.5)
EOSINOPHIL # BLD AUTO: 0.26 K/UL (ref 0–0.51)
EOSINOPHIL NFR BLD: 4.9 % (ref 0–6.9)
ERYTHROCYTE [DISTWIDTH] IN BLOOD BY AUTOMATED COUNT: 42.4 FL (ref 35.9–50)
HCT VFR BLD AUTO: 40.7 % (ref 37–47)
HGB BLD-MCNC: 13.5 G/DL (ref 12–16)
IMM GRANULOCYTES # BLD AUTO: 0.02 K/UL (ref 0–0.11)
IMM GRANULOCYTES NFR BLD AUTO: 0.4 % (ref 0–0.9)
LYMPHOCYTES # BLD AUTO: 1.11 K/UL (ref 1–4.8)
LYMPHOCYTES NFR BLD: 20.9 % (ref 22–41)
MCH RBC QN AUTO: 30.5 PG (ref 27–33)
MCHC RBC AUTO-ENTMCNC: 33.2 G/DL (ref 33.6–35)
MCV RBC AUTO: 92.1 FL (ref 81.4–97.8)
MONOCYTES # BLD AUTO: 0.39 K/UL (ref 0–0.85)
MONOCYTES NFR BLD AUTO: 7.3 % (ref 0–13.4)
NEUTROPHILS # BLD AUTO: 3.5 K/UL (ref 2–7.15)
NEUTROPHILS NFR BLD: 65.7 % (ref 44–72)
NRBC # BLD AUTO: 0 K/UL
NRBC BLD-RTO: 0 /100 WBC
PLATELET # BLD AUTO: 244 K/UL (ref 164–446)
PMV BLD AUTO: 10.9 FL (ref 9–12.9)
PROT SERPL-MCNC: 7.6 G/DL (ref 6–8.2)
RBC # BLD AUTO: 4.42 M/UL (ref 4.2–5.4)
WBC # BLD AUTO: 5.3 K/UL (ref 4.8–10.8)

## 2018-01-12 PROCEDURE — 36415 COLL VENOUS BLD VENIPUNCTURE: CPT

## 2018-01-12 PROCEDURE — 82306 VITAMIN D 25 HYDROXY: CPT

## 2018-01-12 PROCEDURE — 80076 HEPATIC FUNCTION PANEL: CPT

## 2018-01-12 PROCEDURE — 85025 COMPLETE CBC W/AUTO DIFF WBC: CPT

## 2018-01-16 ENCOUNTER — PATIENT MESSAGE (OUTPATIENT)
Dept: MEDICAL GROUP | Facility: PHYSICIAN GROUP | Age: 53
End: 2018-01-16

## 2018-01-17 ENCOUNTER — APPOINTMENT (OUTPATIENT)
Dept: PHYSICAL THERAPY | Facility: REHABILITATION | Age: 53
End: 2018-01-17
Attending: SPECIALIST
Payer: MEDICARE

## 2018-01-19 ENCOUNTER — APPOINTMENT (OUTPATIENT)
Dept: PHYSICAL THERAPY | Facility: REHABILITATION | Age: 53
End: 2018-01-19
Attending: SPECIALIST
Payer: MEDICARE

## 2018-01-24 ENCOUNTER — APPOINTMENT (OUTPATIENT)
Dept: PHYSICAL THERAPY | Facility: REHABILITATION | Age: 53
End: 2018-01-24
Attending: SPECIALIST
Payer: MEDICARE

## 2018-01-26 ENCOUNTER — APPOINTMENT (OUTPATIENT)
Dept: PHYSICAL THERAPY | Facility: REHABILITATION | Age: 53
End: 2018-01-26
Attending: SPECIALIST
Payer: MEDICARE

## 2018-01-31 ENCOUNTER — APPOINTMENT (OUTPATIENT)
Dept: PHYSICAL THERAPY | Facility: REHABILITATION | Age: 53
End: 2018-01-31
Attending: SPECIALIST
Payer: MEDICARE

## 2018-02-21 ENCOUNTER — TELEPHONE (OUTPATIENT)
Dept: MEDICAL GROUP | Facility: PHYSICIAN GROUP | Age: 53
End: 2018-02-21

## 2018-02-21 ENCOUNTER — PATIENT OUTREACH (OUTPATIENT)
Dept: HEALTH INFORMATION MANAGEMENT | Facility: OTHER | Age: 53
End: 2018-02-21

## 2018-02-21 DIAGNOSIS — Z12.11 SCREENING FOR COLON CANCER: ICD-10-CM

## 2018-02-21 NOTE — PROGRESS NOTES
1. Attempt #:Final    2. HealthConnect Verified: yes    3. Verify PCP: yes    4. Care Team Updated:       •   DME Company (gait device, O2, CPAP, etc.): NO       •   Other Specialists (eye doctor, derm, GYN, cardiology, endo, etc): NO    5.  Reviewed/Updated the following with patient:       •   Communication Preference Obtained? YES       •   Preferred Pharmacy? YES       •   Preferred Lab? YES       •   Family History (document living status of immediate family members and if + hx of cancer, diabetes, hypertension, hyperlipidemia, heart attack, stroke) YES. Was Abstract Encounter opened and chart updated? YES    6. [employer] Activation: already active    7. [employer] Una: yes    8. Annual Wellness Visit Scheduling  Scheduling Status:Scheduled      9. Care Gap Scheduling (Attempt to Schedule EACH Overdue Care Gap!)     Health Maintenance Due   Topic Date Due   • Annual Wellness Visit  1965   • IMM DTaP/Tdap/Td Vaccine (1 - Tdap) 03/11/1983   • COLON CANCER SCREENING ANNUAL FIT  03/11/2014// Mailed Fit Test   • MAMMOGRAM  12/05/2017// Will schedule later        Scheduled patient for Annual Wellness Visit    10. Patient was advised: “This is a free wellness visit. The provider will screen for medical conditions to help you stay healthy. If you have other concerns to address you may be asked to discuss these at a separate visit or there may be an additional fee.”     11. Patient was informed to arrive 15 min prior to their scheduled appointment and bring in their medication bottles.

## 2018-03-16 ENCOUNTER — TELEPHONE (OUTPATIENT)
Dept: MEDICAL GROUP | Facility: PHYSICIAN GROUP | Age: 53
End: 2018-03-16

## 2018-03-16 NOTE — TELEPHONE ENCOUNTER
ANNUAL WELLNESS VISIT PRE-VISIT PLANNING WITH OUTREACH    1.  Immunizations were updated in Epic using WebIZ?:Epic matches WebIZ       •  WebIZ Recommendations: TDAP       •  Is patient due for Tdap? YES. Patient was notified of copay/out of pocket cost.       •  Is patient due for Shingles?NO    2.  MDX printed and highlighted for Provider? YES

## 2018-03-22 ENCOUNTER — APPOINTMENT (OUTPATIENT)
Dept: ADMISSIONS | Facility: MEDICAL CENTER | Age: 53
End: 2018-03-22
Payer: MEDICARE

## 2018-03-26 ENCOUNTER — HOSPITAL ENCOUNTER (OUTPATIENT)
Dept: RADIOLOGY | Facility: MEDICAL CENTER | Age: 53
End: 2018-03-26
Attending: OBSTETRICS & GYNECOLOGY | Admitting: OBSTETRICS & GYNECOLOGY
Payer: MEDICARE

## 2018-03-26 DIAGNOSIS — Z01.810 PRE-OPERATIVE CARDIOVASCULAR EXAMINATION: ICD-10-CM

## 2018-03-26 DIAGNOSIS — Z01.812 PRE-OPERATIVE LABORATORY EXAMINATION: ICD-10-CM

## 2018-03-26 LAB
ANION GAP SERPL CALC-SCNC: 9 MMOL/L (ref 0–11.9)
APPEARANCE UR: CLEAR
BASOPHILS # BLD AUTO: 0.5 % (ref 0–1.8)
BASOPHILS # BLD: 0.03 K/UL (ref 0–0.12)
BILIRUB UR QL STRIP.AUTO: NEGATIVE
BUN SERPL-MCNC: 16 MG/DL (ref 8–22)
CALCIUM SERPL-MCNC: 9.4 MG/DL (ref 8.5–10.5)
CHLORIDE SERPL-SCNC: 100 MMOL/L (ref 96–112)
CO2 SERPL-SCNC: 27 MMOL/L (ref 20–33)
COLOR UR: YELLOW
CREAT SERPL-MCNC: 0.77 MG/DL (ref 0.5–1.4)
CULTURE IF INDICATED INDCX: NO UA CULTURE
EKG IMPRESSION: NORMAL
EOSINOPHIL # BLD AUTO: 0.31 K/UL (ref 0–0.51)
EOSINOPHIL NFR BLD: 5.6 % (ref 0–6.9)
ERYTHROCYTE [DISTWIDTH] IN BLOOD BY AUTOMATED COUNT: 45.1 FL (ref 35.9–50)
GLUCOSE SERPL-MCNC: 99 MG/DL (ref 65–99)
GLUCOSE UR STRIP.AUTO-MCNC: NEGATIVE MG/DL
HCG SERPL QL: NEGATIVE
HCT VFR BLD AUTO: 40.4 % (ref 37–47)
HGB BLD-MCNC: 13.4 G/DL (ref 12–16)
IMM GRANULOCYTES # BLD AUTO: 0.03 K/UL (ref 0–0.11)
IMM GRANULOCYTES NFR BLD AUTO: 0.5 % (ref 0–0.9)
KETONES UR STRIP.AUTO-MCNC: NEGATIVE MG/DL
LEUKOCYTE ESTERASE UR QL STRIP.AUTO: NEGATIVE
LYMPHOCYTES # BLD AUTO: 0.97 K/UL (ref 1–4.8)
LYMPHOCYTES NFR BLD: 17.5 % (ref 22–41)
MCH RBC QN AUTO: 31.7 PG (ref 27–33)
MCHC RBC AUTO-ENTMCNC: 33.2 G/DL (ref 33.6–35)
MCV RBC AUTO: 95.5 FL (ref 81.4–97.8)
MICRO URNS: NORMAL
MONOCYTES # BLD AUTO: 0.46 K/UL (ref 0–0.85)
MONOCYTES NFR BLD AUTO: 8.3 % (ref 0–13.4)
NEUTROPHILS # BLD AUTO: 3.74 K/UL (ref 2–7.15)
NEUTROPHILS NFR BLD: 67.6 % (ref 44–72)
NITRITE UR QL STRIP.AUTO: NEGATIVE
NRBC # BLD AUTO: 0 K/UL
NRBC BLD-RTO: 0 /100 WBC
PH UR STRIP.AUTO: 6.5 [PH]
PLATELET # BLD AUTO: 278 K/UL (ref 164–446)
PMV BLD AUTO: 10.8 FL (ref 9–12.9)
POTASSIUM SERPL-SCNC: 3.8 MMOL/L (ref 3.6–5.5)
PROT UR QL STRIP: NEGATIVE MG/DL
RBC # BLD AUTO: 4.23 M/UL (ref 4.2–5.4)
RBC UR QL AUTO: NEGATIVE
SODIUM SERPL-SCNC: 136 MMOL/L (ref 135–145)
SP GR UR STRIP.AUTO: 1.01
UROBILINOGEN UR STRIP.AUTO-MCNC: 0.2 MG/DL
WBC # BLD AUTO: 5.5 K/UL (ref 4.8–10.8)

## 2018-03-26 PROCEDURE — 81003 URINALYSIS AUTO W/O SCOPE: CPT

## 2018-03-26 PROCEDURE — 80048 BASIC METABOLIC PNL TOTAL CA: CPT

## 2018-03-26 PROCEDURE — 36415 COLL VENOUS BLD VENIPUNCTURE: CPT

## 2018-03-26 PROCEDURE — 93010 ELECTROCARDIOGRAM REPORT: CPT | Performed by: INTERNAL MEDICINE

## 2018-03-26 PROCEDURE — 85025 COMPLETE CBC W/AUTO DIFF WBC: CPT

## 2018-03-26 PROCEDURE — 84703 CHORIONIC GONADOTROPIN ASSAY: CPT

## 2018-03-26 PROCEDURE — 72040 X-RAY EXAM NECK SPINE 2-3 VW: CPT

## 2018-03-26 PROCEDURE — 93005 ELECTROCARDIOGRAM TRACING: CPT

## 2018-03-26 RX ORDER — CHOLECALCIFEROL (VITAMIN D3) 50 MCG
TABLET ORAL 2 TIMES DAILY
Status: ON HOLD | COMMUNITY
End: 2019-01-31

## 2018-03-27 ENCOUNTER — TELEPHONE (OUTPATIENT)
Dept: OBGYN | Facility: MEDICAL CENTER | Age: 53
End: 2018-03-27

## 2018-03-27 NOTE — TELEPHONE ENCOUNTER
Patient called and wants to know if she needs to stop her RA medications, her surgery is on Friday. Please call back.

## 2018-03-27 NOTE — TELEPHONE ENCOUNTER
Called and informed the patient that Dr. Asencio stated she just needs to stop taking her medication 10 hours before her surgery. Pt verbalizes understanding and states she will comply.

## 2018-03-27 NOTE — TELEPHONE ENCOUNTER
----- Message from Toni Yang sent at 3/26/2018 10:25 AM PDT -----  Regarding: Preop Rx questions  Contact: [phone]  Has ?s about Sulfasalazine and Xeljanz before her surgery on Friday. Can she take them all week? When she should stop?

## 2018-03-29 ENCOUNTER — GYNECOLOGY VISIT (OUTPATIENT)
Dept: OBGYN | Facility: MEDICAL CENTER | Age: 53
End: 2018-03-29
Payer: MEDICARE

## 2018-03-29 VITALS
HEIGHT: 64 IN | SYSTOLIC BLOOD PRESSURE: 120 MMHG | WEIGHT: 191.3 LBS | BODY MASS INDEX: 32.66 KG/M2 | DIASTOLIC BLOOD PRESSURE: 76 MMHG

## 2018-03-29 DIAGNOSIS — N95.0 PMB (POSTMENOPAUSAL BLEEDING): ICD-10-CM

## 2018-03-29 PROCEDURE — 99213 OFFICE O/P EST LOW 20 MIN: CPT | Performed by: OBSTETRICS & GYNECOLOGY

## 2018-03-29 RX ORDER — OXYCODONE HYDROCHLORIDE AND ACETAMINOPHEN 5; 325 MG/1; MG/1
1 TABLET ORAL EVERY 6 HOURS PRN
Qty: 10 TAB | Refills: 0 | Status: ON HOLD | OUTPATIENT
Start: 2018-03-29 | End: 2018-03-30

## 2018-03-30 ENCOUNTER — HOSPITAL ENCOUNTER (OUTPATIENT)
Facility: MEDICAL CENTER | Age: 53
End: 2018-03-30
Attending: OBSTETRICS & GYNECOLOGY | Admitting: OBSTETRICS & GYNECOLOGY
Payer: MEDICARE

## 2018-03-30 VITALS
HEIGHT: 64 IN | TEMPERATURE: 97.3 F | BODY MASS INDEX: 32.26 KG/M2 | DIASTOLIC BLOOD PRESSURE: 71 MMHG | SYSTOLIC BLOOD PRESSURE: 161 MMHG | HEART RATE: 60 BPM | OXYGEN SATURATION: 100 % | WEIGHT: 188.93 LBS | RESPIRATION RATE: 19 BRPM

## 2018-03-30 PROCEDURE — 88305 TISSUE EXAM BY PATHOLOGIST: CPT

## 2018-03-30 PROCEDURE — A9270 NON-COVERED ITEM OR SERVICE: HCPCS

## 2018-03-30 PROCEDURE — 700102 HCHG RX REV CODE 250 W/ 637 OVERRIDE(OP)

## 2018-03-30 PROCEDURE — 700101 HCHG RX REV CODE 250

## 2018-03-30 PROCEDURE — 160035 HCHG PACU - 1ST 60 MINS PHASE I: Performed by: OBSTETRICS & GYNECOLOGY

## 2018-03-30 PROCEDURE — 700111 HCHG RX REV CODE 636 W/ 250 OVERRIDE (IP)

## 2018-03-30 PROCEDURE — 160048 HCHG OR STATISTICAL LEVEL 1-5: Performed by: OBSTETRICS & GYNECOLOGY

## 2018-03-30 PROCEDURE — 501394 HCHG SOLUTION SORBITOL 3% 3000ML BAG: Performed by: OBSTETRICS & GYNECOLOGY

## 2018-03-30 PROCEDURE — 160036 HCHG PACU - EA ADDL 30 MINS PHASE I: Performed by: OBSTETRICS & GYNECOLOGY

## 2018-03-30 PROCEDURE — 160002 HCHG RECOVERY MINUTES (STAT): Performed by: OBSTETRICS & GYNECOLOGY

## 2018-03-30 PROCEDURE — 160029 HCHG SURGERY MINUTES - 1ST 30 MINS LEVEL 4: Performed by: OBSTETRICS & GYNECOLOGY

## 2018-03-30 PROCEDURE — 500448 HCHG DRESSING, TELFA 3X4: Performed by: OBSTETRICS & GYNECOLOGY

## 2018-03-30 PROCEDURE — 160041 HCHG SURGERY MINUTES - EA ADDL 1 MIN LEVEL 4: Performed by: OBSTETRICS & GYNECOLOGY

## 2018-03-30 PROCEDURE — 502587 HCHG PACK, D&C: Performed by: OBSTETRICS & GYNECOLOGY

## 2018-03-30 PROCEDURE — 160009 HCHG ANES TIME/MIN: Performed by: OBSTETRICS & GYNECOLOGY

## 2018-03-30 RX ORDER — OXYCODONE HCL 5 MG/5 ML
SOLUTION, ORAL ORAL
Status: COMPLETED
Start: 2018-03-30 | End: 2018-03-30

## 2018-03-30 RX ORDER — SODIUM CHLORIDE, SODIUM LACTATE, POTASSIUM CHLORIDE, CALCIUM CHLORIDE 600; 310; 30; 20 MG/100ML; MG/100ML; MG/100ML; MG/100ML
INJECTION, SOLUTION INTRAVENOUS CONTINUOUS
Status: DISCONTINUED | OUTPATIENT
Start: 2018-03-30 | End: 2018-03-30 | Stop reason: HOSPADM

## 2018-03-30 RX ADMIN — FENTANYL CITRATE 25 MCG: 50 INJECTION, SOLUTION INTRAMUSCULAR; INTRAVENOUS at 12:25

## 2018-03-30 RX ADMIN — FENTANYL CITRATE 50 MCG: 50 INJECTION, SOLUTION INTRAMUSCULAR; INTRAVENOUS at 12:40

## 2018-03-30 RX ADMIN — OXYCODONE HYDROCHLORIDE 10 MG: 5 SOLUTION ORAL at 12:20

## 2018-03-30 ASSESSMENT — PAIN SCALES - GENERAL
PAINLEVEL_OUTOF10: 5
PAINLEVEL_OUTOF10: 0
PAINLEVEL_OUTOF10: 5
PAINLEVEL_OUTOF10: 7
PAINLEVEL_OUTOF10: 0
PAINLEVEL_OUTOF10: 0

## 2018-03-30 NOTE — OR SURGEON
Immediate Post OP Note    PreOp Diagnosis: PMB                                Unable to tolerate office pelvic exam    PostOp Diagnosis: same    Procedure(s):  HYSTEROSCOPY WITH VIDEO DIAGNOSTIC - Wound Class: Clean Contaminated  PELVIC EXAM UNDER ANESTHESIA - Wound Class: Clean Contaminated  DILATION AND CURETTAGE - Wound Class: Clean Contaminated    Surgeon(s):  Tamera Arias M.D.    Anesthesiologist/Type of Anesthesia:  Anesthesiologist: Yoselin Rashid M.D./General    Surgical Staff:  Circulator: Phillip Fitzpatrick R.N.; Kandice Dangelo R.N.  Relief Circulator: Gayathri Patel R.N.  Relief Scrub: Natalia Mendes  Scrub Person: Cuca Mortensen    Specimens removed if any:  Endometrial curettings and polyp    Estimated Blood Loss: 50 cc    Findings: uterus is not enlarged no adnexal masses                  Cervix is stenotic                  (+) thickened endometrial lining and polypoid structures seen    Complications: none    3/30/2018 12:11 PM Tamera Arias M.D.

## 2018-03-30 NOTE — PROGRESS NOTES
Chief Complaint   Patient presents with   • Pre-op Exam             HISTORY AND PHYSICAL     PRE-OP DIAGNOSIS:   1. POST-MENOPAUSAL BLEEDING   2. UNABLE TO TOLERATE OFFICE PELVIC EXAM  PROPOSED SURGICAL PROCEDURE:   1. PELVIC EXAM UNDER ANESTHESIA   2. DIAGNOSTIC HYSTEROSCOPY   3. DILATATION AND CURETTAGE    History of present illness:   53 y.o. G0 with PMB presents today to discuss above surgery  She came to gyn office in Jan because of heavy vaginal bleeding episode 3 months prior, 3 weeks duration, after not having periods for 8-9 months  TVS was ordered that showed ES = 1.07 cm  Denies pelvic pains  No family history of breast/ovarian cancer    Review of systems:  Pertinent positives documented in HPI and all other systems reviewed & are negative    All PMH, PSH, allergies, social history and FH reviewed and updated today:  Past Medical History:   Diagnosis Date   • Anemia     H/O   • Arthritis     RA   • Asthma    • Heart burn    • Heart murmur    • High cholesterol     no meds   • Hypertension 9/22/2010   • Hypothyroid 5/27/2010   • Pain 03/2018    RA pain   • Rheumatoid arthritis (CMS-HCC)        Past Surgical History:   Procedure Laterality Date   • TUMOR EXCISION WITH BIOPSY  2005    R thigh; benign   • OTHER ORTHOPEDIC SURGERY  1990    L ankle cyst   • OTHER      repairs from going thru sliding glass at age 4   • OTHER ORTHOPEDIC SURGERY      left hip bone scrape       Allergies: No Known Allergies    Social History     Social History   • Marital status: Single     Spouse name: N/A   • Number of children: N/A   • Years of education: N/A     Occupational History   • Not on file.     Social History Main Topics   • Smoking status: Never Smoker   • Smokeless tobacco: Never Used   • Alcohol use No      Comment: 0-1 a month   • Drug use: Yes     Types: Marijuana, Inhaled      Comment: marijuana PRN (cocaine crank last 1988)   • Sexual activity: Not Currently     Partners: Female      Comment: works inside  [employer]      Other Topics Concern   • Not on file     Social History Narrative   • No narrative on file       Family History   Problem Relation Age of Onset   • Hypertension Mother    • Thyroid Mother    • Heart Disease Mother      Heart Attack   • Heart Disease Maternal Grandmother    • Heart Disease Maternal Grandfather    • Diabetes Neg Hx    • Cancer Neg Hx        Physical exam:  Blood pressure 120/76, height 1.626 m (5' 4\"), weight 86.8 kg (191 lb 4.8 oz), last menstrual period 03/06/2018, not currently breastfeeding.    General:appears stated age, is in no apparent distress, is well developed and well nourished  Head: normocephalic, non-tender  Neck: neck is supple  CV : regular rate and rhythm, no peripheral edema  Lungs: Normal respiratory effort. Clear to auscultation bilaterally  Abdomen: Bowel sounds positive, nondistended, soft, nontender x4, no rebound or guarding. No organomegaly. No masses.  Female GYN:deferred   Skin: No rashes, or ulcers or lesions seen  Psychiatric: Patient shows appropriate affect, is alert and oriented x3, intact judgment and insight.  1. PMB (postmenopausal bleeding)  Consent for Surgery / Procedure    oxyCODONE-acetaminophen (PERCOCET) 5-325 MG Tab    CONSENT FOR OPIATE PRESCRIPTION   2. Complete discussion regarding the proposed procedure was conducted both today and throughout the entire danielle-operative period. The procedure:    1. PELVIC EXAM UNDER ANESTHESIA   2. DIAGNOSTIC HYSTEROSCOPY   3. DILATATION AND CURETTAGE  Was specifically addressed. There is increased risk from  any surgical procedure related to  Anesthesia, increased risk of infection, both abdominal and wound infections as well as heavy bleeding, both during surgery, or delayed bleeding. These were discussed as well.      Patient made aware of risk  of Uterine perforation, infection and Hemorrhage.   Patient made aware that cervical dilatation and intra uterine instruments will be used to allow visualization " of the uterine cavity. Uterine perforation, injury to the bowels and bladder and intra-abdominal organs.   Possible hysterectomy and exploratory laparotomy as a life saving procedure to control hemorrhage and repair of injured bowels or bladder. Above complications can happen but uncommon.   Patient was given the opportunity to ask questions that were answered to stated satisfaction  Pre-op instructions given and reviewed with her  Percocet 5.325 # 10 script provided.

## 2018-03-30 NOTE — OP REPORT
DATE OF SERVICE:  03/30/2018    PREOPERATIVE DIAGNOSES:  1.  Postmenopausal bleeding.  2.  Unable to tolerate office pelvic exam     SURGICAL PROCEDURES:  1.  Pelvic exam under anesthesia.  2.  Diagnostic hysteroscopy.  3.  Dilatation and curettage.    SURGEON:  Tamera Arias MD    ASSISTANT:  None.    ANESTHESIOLOGIST:  Yoselin Rashid MD    ANESTHESIA:  General anesthesia.    SPECIMEN:  Endometrial curetting and polyp.    ESTIMATED BLOOD LOSS:  50 mL.    FINDINGS:  Uterus is not enlarged. No adnexal masses palpated.  Cervix is stenotic. On  diagnostic hysteroscopy, noted is thickened endometrial lining and polypoid structures   seen suggestive of polyps    COMPLICATIONS:  None.    PATIENT CONDITION:  Stable.    DESCRIPTION OF PROCEDURE:  Patient and family were discussed about the risks,   indications, benefits, alternatives of the procedure, they were agreeable to   proceed.  Informed consent signed.    Patient was then taken to the operating room and with IV running and placed in  supine position.  General anesthesia was given without difficulty.  Patient   was then placed in a lithotomy position, prepped and draped in the normal   usual sterile fashion.  The bladder was emptied of clear urine using straight   catheter.  Formal time-out was then done and preoperative antibiotics given.    Speculum examination under anesthesia was then performed, which revealed above  findings.  Pediatric speculum was inserted with good visualization of   the cervix was noted to be stenotic.  The cervix was then serially dilated   using Hegar dilator to accommodate 8 mm hysteroscope.  Diagnostic hysteroscopy  was then activated with water as distending medium with good visualization of  the uterine cavity with the findings noted above.  Hysteroscope was then   withdrawn and sharp curettage was then done to obtain adequate endometrial   curettings as well as polypoid structures obtained.  Specimen was sent to    pathology.  The diagnostic hysteroscopy was then again performed and inserted   gently with good visualization of the endometrial cavity.  There was no   perforation noted.  There was no active bleeding noted as well.  At this   point, the procedure was then terminated.  All instruments were removed.  The   cervix was excellently hemostatic.  The patient had some small abrasion   at the vaginal introitus and the bleeding was controlled by means of a Bovie   cautery.  No other complications noted, patient was sent to the PACU awake and in  stable condition.       ____________________________________     AMBER HOLLOWAY. STA. MD CLIVE ANAND / AARON    DD:  03/30/2018 12:19:37  DT:  03/30/2018 12:40:43    D#:  [id number]  Job#:  [id number]

## 2018-03-30 NOTE — DISCHARGE INSTRUCTIONS
ACTIVITY: Rest and take it easy for the first 24 hours.  A responsible adult is recommended to remain with you during that time.  It is normal to feel sleepy.  We encourage you to not do anything that requires balance, judgment or coordination.    MILD FLU-LIKE SYMPTOMS ARE NORMAL. YOU MAY EXPERIENCE GENERALIZED MUSCLE ACHES, THROAT IRRITATION, HEADACHE AND/OR SOME NAUSEA.    FOR 24 HOURS DO NOT:  Drive, operate machinery or run household appliances.  Drink beer or alcoholic beverages.   Make important decisions or sign legal documents.    SPECIAL INSTRUCTIONS: *Follow instructions from MD**    DIET: To avoid nausea, slowly advance diet as tolerated, avoiding spicy or greasy foods for the first day.  Add more substantial food to your diet according to your physician's instructions.  Babies can be fed formula or breast milk as soon as they are hungry.  INCREASE FLUIDS AND FIBER TO AVOID CONSTIPATION.    SURGICAL DRESSING/BATHING: **No tub baths, hot tubs, etc until approved by MD*    FOLLOW-UP APPOINTMENT:  A follow-up appointment should be arranged with your doctor in *2 weeks**; call to schedule.    You should CALL YOUR PHYSICIAN if you develop:  Fever greater than 101 degrees F.  Pain not relieved by medication, or persistent nausea or vomiting.  Excessive bleeding (blood soaking through dressing) or unexpected drainage from the wound.  Extreme redness or swelling around the incision site, drainage of pus or foul smelling drainage.  Inability to urinate or empty your bladder within 8 hours.  Problems with breathing or chest pain.    You should call 911 if you develop problems with breathing or chest pain.  If you are unable to contact your doctor or surgical center, you should go to the nearest emergency room or urgent care center.  Physician's telephone #: **[phone]*    If any questions arise, call your doctor.  If your doctor is not available, please feel free to call the Surgical Center at [phone].   The Center is open Monday through Friday from 7AM to 7PM.  You can also call the HEALTH HOTLINE open 24 hours/day, 7 days/week and speak to a nurse at [phone], or toll free at [phone].    A registered nurse may call you a few days after your surgery to see how you are doing after your procedure.    MEDICATIONS: Resume taking daily medication.  Take prescribed pain medication with food.  If no medication is prescribed, you may take non-aspirin pain medication if needed.  PAIN MEDICATION CAN BE VERY CONSTIPATING.  Take a stool softener or laxative such as senokot, pericolace, or milk of magnesia if needed.    Prescription given for *Percocet**.  Last pain medication given at *Oxycodone at 1220**.    If your physician has prescribed pain medication that includes Acetaminophen (Tylenol), do not take additional Acetaminophen (Tylenol) while taking the prescribed medication.    Depression / Suicide Risk    As you are discharged from this [facility] facility, it is important to learn how to keep safe from harming yourself.    Recognize the warning signs:  · Abrupt changes in personality, positive or negative- including increase in energy   · Giving away possessions  · Change in eating patterns- significant weight changes-  positive or negative  · Change in sleeping patterns- unable to sleep or sleeping all the time   · Unwillingness or inability to communicate  · Depression  · Unusual sadness, discouragement and loneliness  · Talk of wanting to die  · Neglect of personal appearance   · Rebelliousness- reckless behavior  · Withdrawal from people/activities they love  · Confusion- inability to concentrate     If you or a loved one observes any of these behaviors or has concerns about self-harm, here's what you can do:  · Talk about it- your feelings and reasons for harming yourself  · Remove any means that you might use to hurt yourself (examples: pills, rope, extension cords, firearm)  · Get professional  help from the community (Mental Health, Substance Abuse, psychological counseling)  · Do not be alone:Call your Safe Contact- someone whom you trust who will be there for you.  · Call your local CRISIS HOTLINE 155-3785 or 949-587-6509  · Call your local Children's Mobile Crisis Response Team [place] (513) 577-4671 or www.[employer]  · Call the toll free National Suicide Prevention Hotlines   · National Suicide Prevention Lifeline 883-957-ICOW (0522)  · National Hope Line Network 800-SUICIDE (233-2780)

## 2018-03-30 NOTE — PROGRESS NOTES
"1213 Pt transferred to PACU. Report received from OR RN and anesthesia. tions even and unlabored. Complains of pain 5/10 with cramping, medicated per MAR.  1240 Pt states \"my pain is worse\". Medicated per MAR.   1315 Pt up to get dressed, taken off of monitor.   1335 Pt and S.O. educated on discharge instructions. Verbalized understanding. All questions answered. All belongings are with patient. Pt discharged home. Escorted to car via wheelchair.    "

## 2018-04-04 ENCOUNTER — TELEPHONE (OUTPATIENT)
Dept: OBGYN | Facility: MEDICAL CENTER | Age: 53
End: 2018-04-04

## 2018-04-04 NOTE — TELEPHONE ENCOUNTER
Patient called stating she was having diarrhea at first after surgery but now her feces will not form. I explained that you were off your RA medications and regular medication for 5 days and has since Monday restarted. I explained your body will have to readjust to the medication which may be causing some of this. I told patient to give it until Friday and if it is not better I will ask the doctor for something. Pt states she will comply and has no further questions or concerns at this time.

## 2018-04-12 ENCOUNTER — GYNECOLOGY VISIT (OUTPATIENT)
Dept: OBGYN | Facility: MEDICAL CENTER | Age: 53
End: 2018-04-12
Payer: MEDICARE

## 2018-04-12 VITALS
HEIGHT: 64 IN | DIASTOLIC BLOOD PRESSURE: 78 MMHG | WEIGHT: 191 LBS | SYSTOLIC BLOOD PRESSURE: 125 MMHG | BODY MASS INDEX: 32.61 KG/M2

## 2018-04-12 DIAGNOSIS — Z09 POSTOP CHECK: ICD-10-CM

## 2018-04-12 PROCEDURE — 99024 POSTOP FOLLOW-UP VISIT: CPT | Performed by: OBSTETRICS & GYNECOLOGY

## 2018-04-12 NOTE — PROGRESS NOTES
Chief Complaint   Patient presents with   • Other     Post-op       History of present illness:   53 y.o. S/p 2 weeks hysteroscopy D&C presents for post op check  On Paxil 10 mg - feels sad, tearful, angry for last few days  No vaginal bleeding  No other complaints  Review of systems:  Pertinent positives documented in HPI and all other systems reviewed & are negative    All PMH, PSH, allergies, social history and FH reviewed and updated today:  Past Medical History:   Diagnosis Date   • Anemia     H/O   • Arthritis     RA   • Asthma    • Heart burn    • Heart murmur    • High cholesterol     no meds   • Hypertension 9/22/2010   • Hypothyroid 5/27/2010   • Pain 03/2018    RA pain   • Rheumatoid arthritis (CMS-[facility])        Past Surgical History:   Procedure Laterality Date   • HYSTEROSCOPY WITH VIDEO DIAGNOSTIC N/A 3/30/2018    Procedure: HYSTEROSCOPY WITH VIDEO DIAGNOSTIC;  Surgeon: Tamera Arias M.D.;  Location: SURGERY SAME DAY [facility] ORS;  Service: Obstetrics   • PELVIC EXAM UNDER ANESTHESIA N/A 3/30/2018    Procedure: PELVIC EXAM UNDER ANESTHESIA;  Surgeon: Tamera Arias M.D.;  Location: SURGERY SAME DAY [facility] ORS;  Service: Obstetrics   • DILATION AND CURETTAGE N/A 3/30/2018    Procedure: DILATION AND CURETTAGE;  Surgeon: Tamera Arias M.D.;  Location: SURGERY SAME DAY [facility] ORS;  Service: Obstetrics   • TUMOR EXCISION WITH BIOPSY  2005    R thigh; benign   • OTHER ORTHOPEDIC SURGERY  1990    L ankle cyst   • OTHER      repairs from going thru sliding glass at age 4   • OTHER ORTHOPEDIC SURGERY      left hip bone scrape       Allergies: No Known Allergies    Social History     Social History   • Marital status: Single     Spouse name: N/A   • Number of children: N/A   • Years of education: N/A     Occupational History   • Not on file.     Social History Main Topics   • Smoking status: Never Smoker   • Smokeless tobacco: Never Used   • Alcohol use No       "Comment: 0-1 a month   • Drug use: Yes     Types: Marijuana, Inhaled      Comment: marijuana PRN (cocaine crank last 1988)   • Sexual activity: Not Currently     Partners: Female      Comment: works inside [employer]      Other Topics Concern   • Not on file     Social History Narrative   • No narrative on file       Family History   Problem Relation Age of Onset   • Hypertension Mother    • Thyroid Mother    • Heart Disease Mother      Heart Attack   • Heart Disease Maternal Grandmother    • Heart Disease Maternal Grandfather    • Diabetes Neg Hx    • Cancer Neg Hx      Physical exam:  Blood pressure 125/78, height 1.626 m (5' 4\"), weight 86.6 kg (191 lb), last menstrual period 03/06/2018, not currently breastfeeding.    General:appears stated age, is in no apparent distress, is well developed and well nourished  Head: normocephalic, non-tender  Neck: neck is supple  Abdomen: Bowel sounds positive, nondistended, soft, nontender x4, no rebound or guarding. No organomegaly. No masses.  Skin: No rashes, or ulcers or lesions seen  Psychiatric: Patient shows appropriate affect, is alert and oriented x3, intact judgment and insight.  1. Postop check     2. She will continue to take paxil x 2-3 more weeks and see. She will call Dr Lancaster for annual appt  3. Doing well otherwise  4. Gyn PRN/annual   5. Benign pathology discussed with her. All questions addressed   "

## 2018-04-25 ENCOUNTER — OFFICE VISIT (OUTPATIENT)
Dept: MEDICAL GROUP | Facility: PHYSICIAN GROUP | Age: 53
End: 2018-04-25
Payer: MEDICARE

## 2018-04-25 VITALS
TEMPERATURE: 98.4 F | WEIGHT: 190.8 LBS | HEART RATE: 74 BPM | BODY MASS INDEX: 32.58 KG/M2 | HEIGHT: 64 IN | OXYGEN SATURATION: 94 % | RESPIRATION RATE: 16 BRPM | SYSTOLIC BLOOD PRESSURE: 128 MMHG | DIASTOLIC BLOOD PRESSURE: 82 MMHG

## 2018-04-25 DIAGNOSIS — Z12.11 SCREENING FOR COLON CANCER: ICD-10-CM

## 2018-04-25 DIAGNOSIS — Z12.39 SCREENING FOR BREAST CANCER: ICD-10-CM

## 2018-04-25 DIAGNOSIS — N95.9 MENOPAUSAL DISORDER: ICD-10-CM

## 2018-04-25 DIAGNOSIS — E03.9 ACQUIRED HYPOTHYROIDISM: ICD-10-CM

## 2018-04-25 PROCEDURE — 99214 OFFICE O/P EST MOD 30 MIN: CPT | Performed by: FAMILY MEDICINE

## 2018-04-25 RX ORDER — PAROXETINE HYDROCHLORIDE 20 MG/1
20 TABLET, FILM COATED ORAL DAILY
Qty: 90 TAB | Refills: 3 | Status: SHIPPED | OUTPATIENT
Start: 2018-04-25 | End: 2018-07-19 | Stop reason: SDUPTHER

## 2018-04-25 NOTE — PROGRESS NOTES
Chief Complaint   Patient presents with   • Menopause     hot flashes   • Hypothyroidism     labs       HISTORY OF PRESENT ILLNESS: Patient is a 53 y.o. female established patient here today for the following concerns:    1. Screening for breast cancer  Due for mammogram    2. Screening for colon cancer  Due for colon cancer screening, requesting FIT testing.     3. Acquired hypothyroidism  Continues on levothyroxine 125, due for recheck.  Increase in hot flashes and fatigue.    4. Menopausal disorder  Has been on paxil since D&C with benign pathology.  Hot flashes have been worse lately.  Requesting possible increase of change in medication.        Past Medical, Social, and Family history reviewed and updated in EPIC    Allergies:Patient has no known allergies.    Current Outpatient Prescriptions   Medication Sig Dispense Refill   • PARoxetine (PAXIL) 20 MG Tab Take 1 Tab by mouth every day. 90 Tab 3   • Cholecalciferol (VITAMIN D) 2000 UNIT Tab Take  by mouth 2 Times a Day.     • levothyroxine (SYNTHROID) 125 MCG Tab TAKE ONE TABLET BY MOUTH ONCE DAILY 90 Tab 2   • lisinopril-hydrochlorothiazide (PRINZIDE, ZESTORETIC) 10-12.5 MG per tablet Take 1 Tab by mouth every day. 90 Tab 1   • XELJANZ 5 MG Tab Take 1 Tab by mouth 2 Times a Day.  1   • B Complex Vitamins (VITAMIN B COMPLEX) Tab Take  by mouth.     • Naproxen Sodium (ALEVE) 220 MG CAPS Take  by mouth.     • folic acid (FOLVITE) 1 MG TABS Take 1 mg by mouth every day.     • sulfaSALAzine (AZULFIDINE) 500 MG TABS Take 1,000 mg by mouth 2 times a day.       No current facility-administered medications for this visit.          ROS:  Review of Systems   Constitutional: Negative for fever, chills, weight loss and malaise/fatigue.   HENT: Negative for ear pain, nosebleeds, congestion, sore throat and neck pain.    Eyes: Negative for blurred vision.   Respiratory: Negative for cough, sputum production, shortness of breath and wheezing.    Cardiovascular: Negative for  "chest pain, palpitations,  and leg swelling.   Gastrointestinal: Negative for heartburn, nausea, vomiting, diarrhea and abdominal pain.   Genitourinary: Negative for dysuria, urgency and frequency.   Musculoskeletal: Negative for myalgias, back pain and joint pain.   Skin: Negative for rash and itching.   Neurological: Negative for dizziness, tingling, tremors, sensory change, focal weakness and headaches.   Endo/Heme/Allergies: Does not bruise/bleed easily.   Psychiatric/Behavioral: Negative for depression, anxiety, suicidal ideas, insomnia and memory loss.      Exam:  Blood pressure 128/82, pulse 74, temperature 36.9 °C (98.4 °F), resp. rate 16, height 1.626 m (5' 4\"), weight 86.5 kg (190 lb 12.8 oz), last menstrual period 03/06/2018, SpO2 94 %.    General:  Well nourished, well developed in NAD  Head is grossly normal.  Neck: Supple without JVD   Pulmonary:  Normal effort.   Cardiovascular: Regular rate  Extremities: no clubbing, cyanosis, or edema.  Psych: affect appropriate      Please note that this dictation was created using voice recognition software. I have made every reasonable attempt to correct obvious errors, but I expect that there are errors of grammar and possibly content that I did not discover before finalizing the note.    Assessment/Plan:  1. Screening for breast cancer    - MA-SCREEN MAMMO W/CAD-BILAT; Future    2. Screening for colon cancer    - OCCULT BLOOD FECES IMMUNOASSAY; Future    3. Acquired hypothyroidism  Check levels and adjust accordingly.  continue meds.   - TSH; Future  - FREE THYROXINE; Future  - TRIIDOTHYRONINE; Future    4. Menopausal disorder  increase  - PARoxetine (PAXIL) 20 MG Tab; Take 1 Tab by mouth every day.  Dispense: 90 Tab; Refill: 3    Follow up in the next 3 months.         "

## 2018-04-27 ENCOUNTER — HOSPITAL ENCOUNTER (OUTPATIENT)
Dept: LAB | Facility: MEDICAL CENTER | Age: 53
End: 2018-04-27
Attending: FAMILY MEDICINE
Payer: MEDICARE

## 2018-04-27 ENCOUNTER — HOSPITAL ENCOUNTER (OUTPATIENT)
Dept: LAB | Facility: MEDICAL CENTER | Age: 53
End: 2018-04-27
Attending: SPECIALIST
Payer: MEDICARE

## 2018-04-27 DIAGNOSIS — E03.9 ACQUIRED HYPOTHYROIDISM: ICD-10-CM

## 2018-04-27 LAB
25(OH)D3 SERPL-MCNC: 27 NG/ML (ref 30–100)
ALBUMIN SERPL BCP-MCNC: 4.3 G/DL (ref 3.2–4.9)
ALBUMIN/GLOB SERPL: 1.4 G/DL
ALP SERPL-CCNC: 70 U/L (ref 30–99)
ALT SERPL-CCNC: 23 U/L (ref 2–50)
ANION GAP SERPL CALC-SCNC: 8 MMOL/L (ref 0–11.9)
AST SERPL-CCNC: 19 U/L (ref 12–45)
BASOPHILS # BLD AUTO: 0.9 % (ref 0–1.8)
BASOPHILS # BLD: 0.04 K/UL (ref 0–0.12)
BILIRUB CONJ SERPL-MCNC: 0.1 MG/DL (ref 0.1–0.5)
BILIRUB INDIRECT SERPL-MCNC: 0.5 MG/DL (ref 0–1)
BILIRUB SERPL-MCNC: 0.6 MG/DL (ref 0.1–1.5)
BUN SERPL-MCNC: 16 MG/DL (ref 8–22)
CALCIUM SERPL-MCNC: 9.3 MG/DL (ref 8.5–10.5)
CHLORIDE SERPL-SCNC: 104 MMOL/L (ref 96–112)
CO2 SERPL-SCNC: 28 MMOL/L (ref 20–33)
CREAT SERPL-MCNC: 0.9 MG/DL (ref 0.5–1.4)
EOSINOPHIL # BLD AUTO: 0.23 K/UL (ref 0–0.51)
EOSINOPHIL NFR BLD: 5.2 % (ref 0–6.9)
ERYTHROCYTE [DISTWIDTH] IN BLOOD BY AUTOMATED COUNT: 43.2 FL (ref 35.9–50)
GLOBULIN SER CALC-MCNC: 3.1 G/DL (ref 1.9–3.5)
GLUCOSE SERPL-MCNC: 96 MG/DL (ref 65–99)
HCT VFR BLD AUTO: 39.5 % (ref 37–47)
HGB BLD-MCNC: 12.9 G/DL (ref 12–16)
IMM GRANULOCYTES # BLD AUTO: 0.03 K/UL (ref 0–0.11)
IMM GRANULOCYTES NFR BLD AUTO: 0.7 % (ref 0–0.9)
LYMPHOCYTES # BLD AUTO: 1.04 K/UL (ref 1–4.8)
LYMPHOCYTES NFR BLD: 23.6 % (ref 22–41)
MCH RBC QN AUTO: 31 PG (ref 27–33)
MCHC RBC AUTO-ENTMCNC: 32.7 G/DL (ref 33.6–35)
MCV RBC AUTO: 95 FL (ref 81.4–97.8)
MONOCYTES # BLD AUTO: 0.4 K/UL (ref 0–0.85)
MONOCYTES NFR BLD AUTO: 9.1 % (ref 0–13.4)
NEUTROPHILS # BLD AUTO: 2.67 K/UL (ref 2–7.15)
NEUTROPHILS NFR BLD: 60.5 % (ref 44–72)
NRBC # BLD AUTO: 0 K/UL
NRBC BLD-RTO: 0 /100 WBC
PLATELET # BLD AUTO: 241 K/UL (ref 164–446)
PMV BLD AUTO: 11.5 FL (ref 9–12.9)
POTASSIUM SERPL-SCNC: 3.8 MMOL/L (ref 3.6–5.5)
PROT SERPL-MCNC: 7.4 G/DL (ref 6–8.2)
RBC # BLD AUTO: 4.16 M/UL (ref 4.2–5.4)
SODIUM SERPL-SCNC: 140 MMOL/L (ref 135–145)
T3 SERPL-MCNC: 63.3 NG/DL (ref 60–181)
T4 FREE SERPL-MCNC: 1.35 NG/DL (ref 0.53–1.43)
TSH SERPL DL<=0.005 MIU/L-ACNC: 0.31 UIU/ML (ref 0.38–5.33)
WBC # BLD AUTO: 4.4 K/UL (ref 4.8–10.8)

## 2018-04-27 PROCEDURE — 80053 COMPREHEN METABOLIC PANEL: CPT

## 2018-04-27 PROCEDURE — 36415 COLL VENOUS BLD VENIPUNCTURE: CPT

## 2018-04-27 PROCEDURE — 84443 ASSAY THYROID STIM HORMONE: CPT

## 2018-04-27 PROCEDURE — 82248 BILIRUBIN DIRECT: CPT

## 2018-04-27 PROCEDURE — 85025 COMPLETE CBC W/AUTO DIFF WBC: CPT

## 2018-04-27 PROCEDURE — 82306 VITAMIN D 25 HYDROXY: CPT

## 2018-04-27 PROCEDURE — 84480 ASSAY TRIIODOTHYRONINE (T3): CPT

## 2018-04-27 PROCEDURE — 84439 ASSAY OF FREE THYROXINE: CPT

## 2018-05-23 ENCOUNTER — TELEPHONE (OUTPATIENT)
Dept: OBGYN | Facility: MEDICAL CENTER | Age: 53
End: 2018-05-23

## 2018-05-23 NOTE — TELEPHONE ENCOUNTER
CALL FROM PT STATES THAT HER PCP HAD UPPED HER DOSE OF PAXIL TO 20 MG FOR HER HOTFLASHES. SHE STATES THAT IT HAS BEEN ABOUT 3 WKS AND SHE IS NOT FEELING ANY CHANGES SINCE THE NEW DOSE.  PT WOULD LIKE TO SEE WHAT YOUR RECOMMENDATIONS ARE. SHE WOULD LIKE IF YOU CAN SEND HER A [employer] MESSAGE.  THANK YOU.

## 2018-05-24 ENCOUNTER — PATIENT MESSAGE (OUTPATIENT)
Dept: MEDICAL GROUP | Facility: PHYSICIAN GROUP | Age: 53
End: 2018-05-24

## 2018-05-24 DIAGNOSIS — E03.9 ACQUIRED HYPOTHYROIDISM: ICD-10-CM

## 2018-05-24 RX ORDER — LEVOTHYROXINE SODIUM 112 UG/1
112 TABLET ORAL
Qty: 90 TAB | Refills: 3 | Status: SHIPPED | OUTPATIENT
Start: 2018-05-24 | End: 2018-10-10 | Stop reason: SDUPTHER

## 2018-06-01 ENCOUNTER — TELEPHONE (OUTPATIENT)
Dept: MEDICAL GROUP | Facility: PHYSICIAN GROUP | Age: 53
End: 2018-06-01

## 2018-06-01 NOTE — TELEPHONE ENCOUNTER
Future Appointments       Provider Department Center    6/5/2018 1:40 PM Phoebe Lancaster M.D.; [facility]        ANNUAL WELLNESS VISIT PRE-VISIT PLANNING WITH OUTREACH    1.  Immunizations were updated in Epic using WebIZ?:Yes       •  WebIZ Recommendations: TDAP and MMR       •  Is patient due for Tdap? YES. Patient was not notified of copay/out of pocket cost.       •  Is patient due for Shingles?YES. Patient was not notified of copay/out of pocket cost.  PT. Phone not in service     2.  MDX printed for Provider? YES     3.  Reviewed note from last office visit with PCP: YES 04/25/18    4.  If any orders were placed at last visit, do we have Results/Consult Notes?        •  Labs - Labs ordered, NOT completed. Patient advised to complete prior to next appointment.       •  Imaging - Imaging ordered, NOT completed. Patient advised to complete prior to next appointment.       •  Referrals - No referrals were ordered at last office visit.      5.  Patient is due for the following Health Maintenance Topics:   Health Maintenance Due   Topic Date Due   • Annual Wellness Visit  1965   • IMM DTaP/Tdap/Td Vaccine (1 - Tdap) 03/11/1983   • COLON CANCER SCREENING ANNUAL FIT  03/11/2014   • MAMMOGRAM  12/05/2017       6.  Patient has the following Care Path diagnoses on Problem List:  NONE

## 2018-07-09 NOTE — TELEPHONE ENCOUNTER
Was the patient seen in the last year in this department? Yes     Does patient have an active prescription for medications requested? No     Received Request Via: Pharmacy      Pt met protocol?: Yes, OV 4/18   BP Readings from Last 1 Encounters:   04/25/18 128/82

## 2018-07-10 RX ORDER — LISINOPRIL AND HYDROCHLOROTHIAZIDE 12.5; 1 MG/1; MG/1
TABLET ORAL
Qty: 90 TAB | Refills: 0 | Status: SHIPPED | OUTPATIENT
Start: 2018-07-10 | End: 2018-10-25 | Stop reason: SDUPTHER

## 2018-07-13 ENCOUNTER — HOSPITAL ENCOUNTER (OUTPATIENT)
Dept: RADIOLOGY | Facility: MEDICAL CENTER | Age: 53
End: 2018-07-13
Attending: FAMILY MEDICINE
Payer: MEDICARE

## 2018-07-13 DIAGNOSIS — M25.571 PAIN AND SWELLING OF RIGHT ANKLE: ICD-10-CM

## 2018-07-13 DIAGNOSIS — M25.471 PAIN AND SWELLING OF RIGHT ANKLE: ICD-10-CM

## 2018-07-13 PROCEDURE — 73610 X-RAY EXAM OF ANKLE: CPT | Mod: RT

## 2018-07-19 ENCOUNTER — PATIENT MESSAGE (OUTPATIENT)
Dept: MEDICAL GROUP | Facility: PHYSICIAN GROUP | Age: 53
End: 2018-07-19

## 2018-07-19 DIAGNOSIS — N95.9 MENOPAUSAL DISORDER: ICD-10-CM

## 2018-07-19 RX ORDER — PAROXETINE 10 MG/1
10 TABLET, FILM COATED ORAL DAILY
Qty: 90 TAB | Refills: 3 | Status: SHIPPED | OUTPATIENT
Start: 2018-07-19 | End: 2019-09-13 | Stop reason: SDUPTHER

## 2018-08-09 ENCOUNTER — HOSPITAL ENCOUNTER (OUTPATIENT)
Dept: LAB | Facility: MEDICAL CENTER | Age: 53
End: 2018-08-09
Attending: SPECIALIST
Payer: MEDICARE

## 2018-08-09 ENCOUNTER — HOSPITAL ENCOUNTER (OUTPATIENT)
Dept: RADIOLOGY | Facility: MEDICAL CENTER | Age: 53
End: 2018-08-09
Attending: SPECIALIST
Payer: MEDICARE

## 2018-08-09 DIAGNOSIS — M25.552 LEFT HIP PAIN: ICD-10-CM

## 2018-08-09 DIAGNOSIS — M25.551 RIGHT HIP PAIN: ICD-10-CM

## 2018-08-09 LAB
25(OH)D3 SERPL-MCNC: 31 NG/ML (ref 30–100)
ALBUMIN SERPL BCP-MCNC: 4.7 G/DL (ref 3.2–4.9)
ALP SERPL-CCNC: 75 U/L (ref 30–99)
ALT SERPL-CCNC: 29 U/L (ref 2–50)
AST SERPL-CCNC: 22 U/L (ref 12–45)
BASOPHILS # BLD AUTO: 0.5 % (ref 0–1.8)
BASOPHILS # BLD: 0.03 K/UL (ref 0–0.12)
BILIRUB CONJ SERPL-MCNC: 0.1 MG/DL (ref 0.1–0.5)
BILIRUB INDIRECT SERPL-MCNC: 0.2 MG/DL (ref 0–1)
BILIRUB SERPL-MCNC: 0.3 MG/DL (ref 0.1–1.5)
EOSINOPHIL # BLD AUTO: 0.22 K/UL (ref 0–0.51)
EOSINOPHIL NFR BLD: 3.5 % (ref 0–6.9)
ERYTHROCYTE [DISTWIDTH] IN BLOOD BY AUTOMATED COUNT: 44.8 FL (ref 35.9–50)
HCT VFR BLD AUTO: 38.5 % (ref 37–47)
HGB BLD-MCNC: 12.6 G/DL (ref 12–16)
IMM GRANULOCYTES # BLD AUTO: 0.01 K/UL (ref 0–0.11)
IMM GRANULOCYTES NFR BLD AUTO: 0.2 % (ref 0–0.9)
LYMPHOCYTES # BLD AUTO: 1.31 K/UL (ref 1–4.8)
LYMPHOCYTES NFR BLD: 21.1 % (ref 22–41)
MCH RBC QN AUTO: 30.5 PG (ref 27–33)
MCHC RBC AUTO-ENTMCNC: 32.7 G/DL (ref 33.6–35)
MCV RBC AUTO: 93.2 FL (ref 81.4–97.8)
MONOCYTES # BLD AUTO: 0.52 K/UL (ref 0–0.85)
MONOCYTES NFR BLD AUTO: 8.4 % (ref 0–13.4)
NEUTROPHILS # BLD AUTO: 4.11 K/UL (ref 2–7.15)
NEUTROPHILS NFR BLD: 66.3 % (ref 44–72)
NRBC # BLD AUTO: 0 K/UL
NRBC BLD-RTO: 0 /100 WBC
PLATELET # BLD AUTO: 242 K/UL (ref 164–446)
PMV BLD AUTO: 11.3 FL (ref 9–12.9)
PROT SERPL-MCNC: 7 G/DL (ref 6–8.2)
RBC # BLD AUTO: 4.13 M/UL (ref 4.2–5.4)
WBC # BLD AUTO: 6.2 K/UL (ref 4.8–10.8)

## 2018-08-09 PROCEDURE — 85025 COMPLETE CBC W/AUTO DIFF WBC: CPT

## 2018-08-09 PROCEDURE — 80076 HEPATIC FUNCTION PANEL: CPT

## 2018-08-09 PROCEDURE — 73522 X-RAY EXAM HIPS BI 3-4 VIEWS: CPT

## 2018-08-09 PROCEDURE — 82306 VITAMIN D 25 HYDROXY: CPT

## 2018-08-09 PROCEDURE — 36415 COLL VENOUS BLD VENIPUNCTURE: CPT

## 2018-10-10 RX ORDER — LEVOTHYROXINE SODIUM 112 UG/1
112 TABLET ORAL
Qty: 90 TAB | Refills: 1 | Status: SHIPPED | OUTPATIENT
Start: 2018-10-10 | End: 2018-10-22 | Stop reason: SDUPTHER

## 2018-10-10 RX ORDER — LEVOTHYROXINE SODIUM 0.12 MG/1
TABLET ORAL
Refills: 2 | OUTPATIENT
Start: 2018-10-10

## 2018-10-10 NOTE — TELEPHONE ENCOUNTER
*CHANGE IN PHARMACY*  Was the patient seen in the last year in this department? Yes    Does patient have an active prescription for medications requested? No     Received Request Via: Pharmacy      Pt met protocol?: Yes    LAST OV 07/13/2018      Lab Results  Component Value Date/Time   TSHULTRASEN 0.310 (L) 04/27/2018 0902

## 2018-10-18 ENCOUNTER — PATIENT OUTREACH (OUTPATIENT)
Dept: HEALTH INFORMATION MANAGEMENT | Facility: OTHER | Age: 53
End: 2018-10-18

## 2018-10-18 NOTE — PROGRESS NOTES
Outcome: Left Message    Please transfer to Patient Outreach Team at [phone] when patient returns call.    WebIZ Checked & Epic Updated:  yes    HealthConnect Verified: yes    Attempt # 1

## 2018-10-22 ENCOUNTER — PATIENT MESSAGE (OUTPATIENT)
Dept: MEDICAL GROUP | Facility: PHYSICIAN GROUP | Age: 53
End: 2018-10-22

## 2018-10-22 RX ORDER — LEVOTHYROXINE SODIUM 0.12 MG/1
125 TABLET ORAL
Qty: 90 TAB | Refills: 3 | Status: SHIPPED | OUTPATIENT
Start: 2018-10-22 | End: 2018-10-22 | Stop reason: SDUPTHER

## 2018-10-22 RX ORDER — LEVOTHYROXINE SODIUM 0.12 MG/1
TABLET ORAL
Refills: 2 | OUTPATIENT
Start: 2018-10-22

## 2018-10-22 RX ORDER — LEVOTHYROXINE SODIUM 0.12 MG/1
125 TABLET ORAL
Qty: 90 TAB | Refills: 3 | Status: SHIPPED | OUTPATIENT
Start: 2018-10-22 | End: 2019-11-07

## 2018-10-22 RX ORDER — LEVOTHYROXINE SODIUM 112 UG/1
112 TABLET ORAL
Qty: 90 TAB | Refills: 1 | Status: SHIPPED | OUTPATIENT
Start: 2018-10-22 | End: 2018-10-22 | Stop reason: SDUPTHER

## 2018-10-29 RX ORDER — LISINOPRIL AND HYDROCHLOROTHIAZIDE 12.5; 1 MG/1; MG/1
TABLET ORAL
Qty: 90 TAB | Refills: 1 | Status: SHIPPED | OUTPATIENT
Start: 2018-10-29 | End: 2019-05-10 | Stop reason: SDUPTHER

## 2018-10-29 NOTE — TELEPHONE ENCOUNTER
Was the patient seen in the last year in this department? Yes    Does patient have an active prescription for medications requested? No     Received Request Via: Pharmacy    Pt met protocol?: Yes     Last OV 07/2018    BP Readings from Last 1 Encounters:   07/13/18 126/80

## 2018-10-29 NOTE — TELEPHONE ENCOUNTER
Refill X 6 months, sent to pharmacy.Pt. Seen in the last 6 months per protocol.   Lab Results   Component Value Date/Time    SODIUM 140 04/27/2018 09:04 AM    POTASSIUM 3.8 04/27/2018 09:04 AM    CHLORIDE 104 04/27/2018 09:04 AM    CO2 28 04/27/2018 09:04 AM    GLUCOSE 96 04/27/2018 09:04 AM    BUN 16 04/27/2018 09:04 AM    CREATININE 0.90 04/27/2018 09:04 AM

## 2018-11-02 NOTE — PROGRESS NOTES
Outcome: Left Message    Please transfer to Patient Outreach Team at [phone] when patient returns call.    Attempt # 2

## 2018-11-07 NOTE — PROGRESS NOTES
Outcome: Left Message    Please transfer to Patient Outreach Team at [phone] when patient returns call.      Attempt # 3

## 2018-11-13 NOTE — PROGRESS NOTES
Attempt #:Final  Verify PCP: yes    Communication Preference Obtained: not able to verify more info/ Pt was at work.    Annual Wellness Visit Scheduling  1. Scheduling Status:Scheduled     Care Gap Scheduling (Attempt to Schedule EACH Overdue Care Gap!) / Unable to go over care gaps.  Health Maintenance Due   Topic Date Due   • Annual Wellness Visit  1965   • IMM DTaP/Tdap/Td Vaccine (1 - Tdap) 03/11/1983   • COLON CANCER SCREENING ANNUAL FIT  03/11/2014   • IMM ZOSTER VACCINES (1 of 2) 03/11/2014   • MAMMOGRAM  12/05/2017   • IMM INFLUENZA (1) 09/01/2018       MyChart Activation: already active

## 2018-11-29 ENCOUNTER — HOSPITAL ENCOUNTER (OUTPATIENT)
Dept: LAB | Facility: MEDICAL CENTER | Age: 53
End: 2018-11-29
Attending: SPECIALIST
Payer: MEDICARE

## 2018-11-29 LAB
ALBUMIN SERPL BCP-MCNC: 4.3 G/DL (ref 3.2–4.9)
ALP SERPL-CCNC: 84 U/L (ref 30–99)
ALT SERPL-CCNC: 24 U/L (ref 2–50)
AST SERPL-CCNC: 18 U/L (ref 12–45)
BASOPHILS # BLD AUTO: 0.5 % (ref 0–1.8)
BASOPHILS # BLD: 0.03 K/UL (ref 0–0.12)
BILIRUB CONJ SERPL-MCNC: <0.1 MG/DL (ref 0.1–0.5)
BILIRUB INDIRECT SERPL-MCNC: NORMAL MG/DL (ref 0–1)
BILIRUB SERPL-MCNC: 0.4 MG/DL (ref 0.1–1.5)
CREAT SERPL-MCNC: 0.91 MG/DL (ref 0.5–1.4)
EOSINOPHIL # BLD AUTO: 0.24 K/UL (ref 0–0.51)
EOSINOPHIL NFR BLD: 4.2 % (ref 0–6.9)
ERYTHROCYTE [DISTWIDTH] IN BLOOD BY AUTOMATED COUNT: 43.4 FL (ref 35.9–50)
HCT VFR BLD AUTO: 38.5 % (ref 37–47)
HGB BLD-MCNC: 12.6 G/DL (ref 12–16)
IMM GRANULOCYTES # BLD AUTO: 0.01 K/UL (ref 0–0.11)
IMM GRANULOCYTES NFR BLD AUTO: 0.2 % (ref 0–0.9)
LYMPHOCYTES # BLD AUTO: 1.4 K/UL (ref 1–4.8)
LYMPHOCYTES NFR BLD: 24.7 % (ref 22–41)
MCH RBC QN AUTO: 30.4 PG (ref 27–33)
MCHC RBC AUTO-ENTMCNC: 32.7 G/DL (ref 33.6–35)
MCV RBC AUTO: 92.8 FL (ref 81.4–97.8)
MONOCYTES # BLD AUTO: 0.49 K/UL (ref 0–0.85)
MONOCYTES NFR BLD AUTO: 8.7 % (ref 0–13.4)
NEUTROPHILS # BLD AUTO: 3.49 K/UL (ref 2–7.15)
NEUTROPHILS NFR BLD: 61.7 % (ref 44–72)
NRBC # BLD AUTO: 0 K/UL
NRBC BLD-RTO: 0 /100 WBC
PLATELET # BLD AUTO: 261 K/UL (ref 164–446)
PMV BLD AUTO: 11 FL (ref 9–12.9)
PROT SERPL-MCNC: 7.3 G/DL (ref 6–8.2)
RBC # BLD AUTO: 4.15 M/UL (ref 4.2–5.4)
WBC # BLD AUTO: 5.7 K/UL (ref 4.8–10.8)

## 2018-11-29 PROCEDURE — 85025 COMPLETE CBC W/AUTO DIFF WBC: CPT

## 2018-11-29 PROCEDURE — 82565 ASSAY OF CREATININE: CPT

## 2018-11-29 PROCEDURE — 36415 COLL VENOUS BLD VENIPUNCTURE: CPT

## 2018-11-29 PROCEDURE — 80076 HEPATIC FUNCTION PANEL: CPT

## 2018-12-05 ENCOUNTER — PATIENT OUTREACH (OUTPATIENT)
Dept: HEALTH INFORMATION MANAGEMENT | Facility: OTHER | Age: 53
End: 2018-12-05

## 2018-12-05 NOTE — PROGRESS NOTES
Outcome: Needs Further Review - pt stated she will completed fit test end of this week  pt does not remember where tdap completed  pt will call back for mammogram(or we can call after 3pm)    Please transfer to Patient Outreach Team at [phone] when patient returns call.    WebIZ Checked & Epic Updated:  yes    HealthConnect Verified: yes    Attempt # 1

## 2018-12-07 ENCOUNTER — TELEPHONE (OUTPATIENT)
Dept: MEDICAL GROUP | Facility: PHYSICIAN GROUP | Age: 53
End: 2018-12-07

## 2018-12-07 RX ORDER — MELOXICAM 7.5 MG/1
7.5 TABLET ORAL DAILY
COMMUNITY
End: 2021-01-04

## 2018-12-07 NOTE — TELEPHONE ENCOUNTER
Future Appointments       Provider Department Center    12/20/2018 9:40 AM Phoebe Lancaster M.D.; [facility]        ANNUAL WELLNESS VISIT PRE-VISIT PLANNING WITHOUT OUTREACH    1.  Reviewed note from last office visit with PCP: YES    2.  If any orders were placed at last visit, do we have Results/Consult Notes?        •  Labs - Labs ordered, completed on 11/29/2018 and results are in chart.       •  Imaging - Imaging was not ordered at last office visit.       •  Referrals - No referrals were ordered at last office visit.    3.  Immunizations were updated in ulike using WebIZ?: Yes       •  WebIZ Recommendations: TDAP, SHINGRIX (Shingles) and MMR        •  Is patient due for Tdap? YES. Patient was notified of copay/out of pocket cost. $47.00 COPAY       •  Is patient due for Shingles? YES. Patient was not notified of copay/out of pocket cost.     4.  Patient is due for the following Health Maintenance Topics:   Health Maintenance Due   Topic Date Due   • Annual Wellness Visit  1965   • IMM DTaP/Tdap/Td Vaccine (1 - Tdap) 03/11/1983   • COLON CANCER SCREENING ANNUAL FIT  03/11/2014   • IMM ZOSTER VACCINES (1 of 2) 03/11/2014   • MAMMOGRAM  12/05/2017     5.  Reviewed/Updated the following with patient:       •   Preferred Pharmacy? YES       •   Preferred Lab? YES       •   Preferred Communication? YES       •   Allergies? YES       •   Medications? YES. Was Abstract Encounter opened and chart updated? YES       •   Social History? YES. Was Abstract Encounter opened and chart updated? YES       •   Family History (document living status of immediate family members and if + hx of  cancer, diabetes, hypertension, hyperlipidemia, heart attack, stroke) YES. Was Abstract Encounter opened and chart updated? YES    6.  Care Team Updated:       •   DME Company (gait device, O2, CPAP, etc.): NO       •   Other Specialists (eye doctor, derm, GYN, cardiology, endo, etc):  YES    7.  Patient has the following Care Path diagnoses on Problem List:  NONE    8.  Patient was advised: “This is a free wellness visit. The provider will screen for medical conditions to help you stay healthy. If you have other concerns to address you may be asked to discuss these at a separate visit or there may be an additional fee.”     9.  Patient was informed to arrive 15 min prior to their scheduled appointment and bring in their medication bottles.

## 2018-12-20 ENCOUNTER — PATIENT MESSAGE (OUTPATIENT)
Dept: MEDICAL GROUP | Facility: PHYSICIAN GROUP | Age: 53
End: 2018-12-20

## 2018-12-20 ENCOUNTER — TELEPHONE (OUTPATIENT)
Dept: MEDICAL GROUP | Facility: PHYSICIAN GROUP | Age: 53
End: 2018-12-20

## 2018-12-20 DIAGNOSIS — M16.0 PRIMARY OSTEOARTHRITIS OF BOTH HIPS: ICD-10-CM

## 2018-12-20 NOTE — TELEPHONE ENCOUNTER
----- Message from Abdias Whitaker sent at 12/20/2018  9:46 AM PST -----  Regarding: Procedure Question  Contact: [phone]  Maximus Lancaster. Abdias here. Hopefully you know that my RA Dr Kan suggested hip surgery. I'd like to know if you can send a referral and x-rays over to [facility]. I'm interested in Dr Werner Carty.

## 2018-12-20 NOTE — TELEPHONE ENCOUNTER
Please see message below from patient.  Not sure want to see the patient first.  Please advise.    Thank you.

## 2018-12-31 ENCOUNTER — OFFICE VISIT (OUTPATIENT)
Dept: MEDICAL GROUP | Facility: PHYSICIAN GROUP | Age: 53
End: 2018-12-31
Payer: MEDICARE

## 2018-12-31 VITALS
RESPIRATION RATE: 16 BRPM | OXYGEN SATURATION: 96 % | TEMPERATURE: 97.9 F | BODY MASS INDEX: 31.58 KG/M2 | HEART RATE: 60 BPM | HEIGHT: 64 IN | DIASTOLIC BLOOD PRESSURE: 82 MMHG | WEIGHT: 185 LBS | SYSTOLIC BLOOD PRESSURE: 122 MMHG

## 2018-12-31 DIAGNOSIS — Z01.810 PRE-OPERATIVE CARDIOVASCULAR EXAMINATION: ICD-10-CM

## 2018-12-31 PROCEDURE — 99213 OFFICE O/P EST LOW 20 MIN: CPT | Performed by: INTERNAL MEDICINE

## 2018-12-31 ASSESSMENT — PATIENT HEALTH QUESTIONNAIRE - PHQ9: CLINICAL INTERPRETATION OF PHQ2 SCORE: 0

## 2018-12-31 NOTE — ASSESSMENT & PLAN NOTE
Abdias is here for pre-operative risk stratification for upcoming right total hip arthroplasty with Dr. Davis. She saw her rheumatologist Dr. Kan who noted to her to stop the xeljanz and mobic 10 days before and for 10 days after surgery, may remain on sulfasalazine. Her last surgery was with her gynecologist under anesthesia 3/2018 which she tolerated ok. Denies chest pain, shortness of breath. Not very active right now, can't walk a block or two since she is limited by her hip pain. Able to climb a flight of stairs every night which is limited only by her hip pain and not by chest pain or shortness of breath.

## 2018-12-31 NOTE — LETTER
2018        Re: Abdias Whitaker  : 1965      Abdias Lea is low risk for right total hip arthroplasty and may proceed without any additional work up. She noted that her rheumatologist, Dr. Kan, sent you start and stop parameters of her rheumatoid medications for surgery.        Please call if questions,         John Reed MD

## 2018-12-31 NOTE — PROGRESS NOTES
PRIMARY CARE CLINIC FOLLOW UP VISIT  Chief Complaint   Patient presents with   • Medical Clearance       History of Present Illness     Abdias is a pleasant 54 yo female patient of Dr. Lancaster here for the following:     Pre-operative cardiovascular examination  Abdias is here for pre-operative risk stratification for upcoming right total hip arthroplasty with Dr. Davis. She saw her rheumatologist Dr. Kan who noted to her to stop the xeljanz and mobic 10 days before and for 10 days after surgery, may remain on sulfasalazine. Her last surgery was with her gynecologist under anesthesia 3/2018 which she tolerated ok. Denies chest pain, shortness of breath. Not very active right now, can't walk a block or two since she is limited by her hip pain. Able to climb a flight of stairs every night which is limited only by her hip pain and not by chest pain or shortness of breath.     Current Outpatient Prescriptions   Medication Sig Dispense Refill   • meloxicam (MOBIC) 7.5 MG Tab Take 7.5 mg by mouth every day.     • lisinopril-hydrochlorothiazide (PRINZIDE, ZESTORETIC) 10-12.5 MG per tablet TAKE 1 TABLET BY MOUTH ONCE DAILY 90 Tab 1   • levothyroxine (SYNTHROID) 125 MCG Tab Take 1 Tab by mouth Every morning on an empty stomach. 90 Tab 3   • PARoxetine (PAXIL) 10 MG Tab Take 1 Tab by mouth every day. 90 Tab 3   • Cholecalciferol (VITAMIN D) 2000 UNIT Tab Take  by mouth 2 Times a Day.     • XELJANZ 5 MG Tab Take 1 Tab by mouth 2 Times a Day.  1   • B Complex Vitamins (VITAMIN B COMPLEX) Tab Take  by mouth.     • folic acid (FOLVITE) 1 MG TABS Take 1 mg by mouth every day.     • sulfaSALAzine (AZULFIDINE) 500 MG TABS Take 1,000 mg by mouth 2 times a day.       No current facility-administered medications for this visit.      Past Medical History:   Diagnosis Date   • Anemia     H/O   • Arthritis     RA   • Asthma    • Heart burn    • Heart murmur    • High cholesterol     no meds   • Hypertension 9/22/2010   • Hypothyroid  "2010   • Pain 2018    RA pain   • Rheumatoid arthritis (HCC)      Past Surgical History:   Procedure Laterality Date   • HYSTEROSCOPY WITH VIDEO DIAGNOSTIC N/A 3/30/2018    Procedure: HYSTEROSCOPY WITH VIDEO DIAGNOSTIC;  Surgeon: Tamera Arias M.D.;  Location: SURGERY SAME DAY [facility];  Service: Obstetrics   • PELVIC EXAM UNDER ANESTHESIA N/A 3/30/2018    Procedure: PELVIC EXAM UNDER ANESTHESIA;  Surgeon: Tamera Arias M.D.;  Location: SURGERY SAME DAY [facility];  Service: Obstetrics   • DILATION AND CURETTAGE N/A 3/30/2018    Procedure: DILATION AND CURETTAGE;  Surgeon: Tamera Arias M.D.;  Location: SURGERY SAME DAY [facility];  Service: Obstetrics   • TUMOR EXCISION WITH BIOPSY      R thigh; benign   • OTHER ORTHOPEDIC SURGERY      L ankle cyst   • OTHER      repairs from going thru sliding glass at age 4   • OTHER ORTHOPEDIC SURGERY      left hip bone scrape     Social History   Substance Use Topics   • Smoking status: Never Smoker   • Smokeless tobacco: Never Used   • Alcohol use No     Social History     Social History Narrative   • No narrative on file     Family History   Problem Relation Age of Onset   • Hypertension Mother    • Thyroid Mother    • Heart Disease Mother         Heart Attack   • Heart Disease Maternal Grandmother    • Heart Disease Maternal Grandfather    • No Known Problems Brother    • Diabetes Neg Hx    • Cancer Neg Hx      Family Status   Relation Status   • Mo Alive   • Fa         not close with father   • MGMo    • MGFa    • Bro Alive   • PGMo    • PGFa    • Neg Hx (Not Specified)     Allergies: Patient has no known allergies.    ROS  As per HPI above. All other systems reviewed and negative.        Objective   Blood pressure 122/82, pulse 60, temperature 36.6 °C (97.9 °F), resp. rate 16, height 1.626 m (5' 4\"), weight 83.9 kg (185 lb), last menstrual period 2018, SpO2 " 96 %, not currently breastfeeding. Body mass index is 31.76 kg/m².    General: alert and oriented, pleasant, cooperative  HEENT: Normocephalic, atraumatic. No thyroid masses. Oropharynx clear without exudate or injection.   Cardiovascular: regular rate and rhythm, normal S1/S2  Pulmonary: lungs clear to auscultation bilaterally  Extremities: no edema of bilateral lower extremities    Skin: warm and dry, no lesions or rashes  Psychiatric: appropriate mood and affect. Good insight and appropriate judgment     Assessment and Plan   The following treatment plan was discussed     1. Pre-operative cardiovascular examination  Abdias is low risk for proceeding with total right hip arthroplasty. EKG reviewed 3/2018 and normal sinus rhythm.     Healthcare maintenance     Health Maintenance Due   Topic Date Due   • Annual Wellness Visit  1965   • IMM DTaP/Tdap/Td Vaccine (1 - Tdap) 03/11/1983   • COLON CANCER SCREENING ANNUAL FIT  03/11/2014   • IMM ZOSTER VACCINES (1 of 2) 03/11/2014   • MAMMOGRAM  12/05/2017       No Follow-up on file.    John Reed MD  Internal Medicine  [facility]

## 2019-01-16 DIAGNOSIS — Z01.812 PRE-OPERATIVE LABORATORY EXAMINATION: ICD-10-CM

## 2019-01-16 DIAGNOSIS — Z01.810 PRE-OPERATIVE CARDIOVASCULAR EXAMINATION: ICD-10-CM

## 2019-01-16 LAB
ALBUMIN SERPL BCP-MCNC: 4.6 G/DL (ref 3.2–4.9)
ALBUMIN/GLOB SERPL: 1.5 G/DL
ALP SERPL-CCNC: 81 U/L (ref 30–99)
ALT SERPL-CCNC: 26 U/L (ref 2–50)
ANION GAP SERPL CALC-SCNC: 10 MMOL/L (ref 0–11.9)
APTT PPP: 30 SEC (ref 24.7–36)
AST SERPL-CCNC: 18 U/L (ref 12–45)
BASOPHILS # BLD AUTO: 0.8 % (ref 0–1.8)
BASOPHILS # BLD: 0.04 K/UL (ref 0–0.12)
BILIRUB SERPL-MCNC: 0.8 MG/DL (ref 0.1–1.5)
BUN SERPL-MCNC: 17 MG/DL (ref 8–22)
CALCIUM SERPL-MCNC: 10.1 MG/DL (ref 8.5–10.5)
CHLORIDE SERPL-SCNC: 102 MMOL/L (ref 96–112)
CHOLEST SERPL-MCNC: 219 MG/DL (ref 100–199)
CO2 SERPL-SCNC: 27 MMOL/L (ref 20–33)
CREAT SERPL-MCNC: 0.76 MG/DL (ref 0.5–1.4)
EKG IMPRESSION: NORMAL
EOSINOPHIL # BLD AUTO: 0.2 K/UL (ref 0–0.51)
EOSINOPHIL NFR BLD: 3.9 % (ref 0–6.9)
ERYTHROCYTE [DISTWIDTH] IN BLOOD BY AUTOMATED COUNT: 44.6 FL (ref 35.9–50)
EST. AVERAGE GLUCOSE BLD GHB EST-MCNC: 100 MG/DL
GLOBULIN SER CALC-MCNC: 3.1 G/DL (ref 1.9–3.5)
GLUCOSE SERPL-MCNC: 74 MG/DL (ref 65–99)
HBA1C MFR BLD: 5.1 % (ref 0–5.6)
HCT VFR BLD AUTO: 40.9 % (ref 37–47)
HDLC SERPL-MCNC: 86 MG/DL
HGB BLD-MCNC: 13.4 G/DL (ref 12–16)
IMM GRANULOCYTES # BLD AUTO: 0.01 K/UL (ref 0–0.11)
IMM GRANULOCYTES NFR BLD AUTO: 0.2 % (ref 0–0.9)
INR PPP: 1.1 (ref 0.87–1.13)
LDLC SERPL CALC-MCNC: 114 MG/DL
LYMPHOCYTES # BLD AUTO: 0.91 K/UL (ref 1–4.8)
LYMPHOCYTES NFR BLD: 17.9 % (ref 22–41)
MCH RBC QN AUTO: 30.9 PG (ref 27–33)
MCHC RBC AUTO-ENTMCNC: 32.8 G/DL (ref 33.6–35)
MCV RBC AUTO: 94.5 FL (ref 81.4–97.8)
MONOCYTES # BLD AUTO: 0.39 K/UL (ref 0–0.85)
MONOCYTES NFR BLD AUTO: 7.7 % (ref 0–13.4)
NEUTROPHILS # BLD AUTO: 3.52 K/UL (ref 2–7.15)
NEUTROPHILS NFR BLD: 69.5 % (ref 44–72)
NRBC # BLD AUTO: 0 K/UL
NRBC BLD-RTO: 0 /100 WBC
PLATELET # BLD AUTO: 248 K/UL (ref 164–446)
PMV BLD AUTO: 11.2 FL (ref 9–12.9)
POTASSIUM SERPL-SCNC: 3.8 MMOL/L (ref 3.6–5.5)
PROT SERPL-MCNC: 7.7 G/DL (ref 6–8.2)
PROTHROMBIN TIME: 14.4 SEC (ref 12–14.6)
RBC # BLD AUTO: 4.33 M/UL (ref 4.2–5.4)
SODIUM SERPL-SCNC: 139 MMOL/L (ref 135–145)
TRIGL SERPL-MCNC: 97 MG/DL (ref 0–149)
WBC # BLD AUTO: 5.1 K/UL (ref 4.8–10.8)

## 2019-01-16 PROCEDURE — 87389 HIV-1 AG W/HIV-1&-2 AB AG IA: CPT

## 2019-01-16 PROCEDURE — 87640 STAPH A DNA AMP PROBE: CPT | Mod: XU

## 2019-01-16 PROCEDURE — 93010 ELECTROCARDIOGRAM REPORT: CPT | Performed by: INTERNAL MEDICINE

## 2019-01-16 PROCEDURE — 80061 LIPID PANEL: CPT

## 2019-01-16 PROCEDURE — 85025 COMPLETE CBC W/AUTO DIFF WBC: CPT

## 2019-01-16 PROCEDURE — 36415 COLL VENOUS BLD VENIPUNCTURE: CPT

## 2019-01-16 PROCEDURE — 85730 THROMBOPLASTIN TIME PARTIAL: CPT

## 2019-01-16 PROCEDURE — 93005 ELECTROCARDIOGRAM TRACING: CPT

## 2019-01-16 PROCEDURE — 83036 HEMOGLOBIN GLYCOSYLATED A1C: CPT

## 2019-01-16 PROCEDURE — 80053 COMPREHEN METABOLIC PANEL: CPT

## 2019-01-16 PROCEDURE — 85610 PROTHROMBIN TIME: CPT

## 2019-01-16 PROCEDURE — 87641 MR-STAPH DNA AMP PROBE: CPT

## 2019-01-16 NOTE — DISCHARGE PLANNING
DISCHARGE PLANNING NOTE - TOTAL JOINT     Procedure: Procedure(s):  HIP ARTHROPLASTY TOTAL  Procedure Date: 1/30/2019  Insurance:  Payor: S[employer]IOR [employer]US / Plan: S[employer]IOR [employer] PLUS / Product Type: *No Product type* /   Equipment currently available at home? bedside commode, front-wheel walker, shower chair and sock assist, grabber  Steps into the home? threshold  Steps within the home? 3 story will remain downstairs  Toilet height? Standard  Type of shower? tub-shower with hose  Who will be with you during your recovery? Spouse--Rachid  Is Outpatient Physical Therapy set up after surgery? Yes 2/6/19  Did you take the Total Joint Class and where? Yes, at [facility]     Plan: Met with patient during preadmission appointment.  Reviewed Equipment Resource list and provided a copy to the patient with Care Chest recommendation.  Provided and reviewed Home Safety Checklist.  Quiet Hours information given and reviewed.  There are no identified discharge needs at this time.  Anticipate discharge home with family.  No Dme needed at this time.

## 2019-01-16 NOTE — OR NURSING
PreAdmit Appointment: Patient given Preparing for your procedure handout. Patient instructed to continue regularly prescribed medications through day before surgery. Instructed to take the following medications the day of surgery with a sip of water per Anesthesia protocol: Synthroid. CHG scrub kit given to patient along with instructions. Pt states she was instructed to stop her Mobic and Xeljanz 1 week prior to surgery by her Rheumatologist.

## 2019-01-16 NOTE — DISCHARGE PLANNING
"TOTAL JOINT REPLACEMENT SURGERY   PreAdmit Appointment  Pre-Operative Education Note       1) Did you take a Total Joint Replacement Pre-Operative Education class?  Where?  Answer: Yes, at the [facility]    2) If you did not take a class, did you receive pre-op education in the form of a book or through an online class?    Answer: Yes, from the [facility]    3) Have you had the same joint replacement procedure within the last 3 years?   Answer: No    For patients who answered \"No\" to the above questions:     4) Was patient given information on [facility] Pre-Op Education class through the Pre-Op Education Class flyer or the Alternative Pre-op Education flyer?   Answer:     5) Was a [facility] Total Joint Replacement Patient Guide binder handed out?   Answer:    6) Did the patient refuse preoperative education?  Answer:    "

## 2019-01-17 LAB
SCCMEC + MECA PNL NOSE NAA+PROBE: NEGATIVE
SCCMEC + MECA PNL NOSE NAA+PROBE: POSITIVE

## 2019-01-18 LAB — HIV 1+2 AB+HIV1 P24 AG SERPL QL IA: NON REACTIVE

## 2019-01-30 ENCOUNTER — APPOINTMENT (OUTPATIENT)
Dept: RADIOLOGY | Facility: MEDICAL CENTER | Age: 54
DRG: 470 | End: 2019-01-30
Attending: ORTHOPAEDIC SURGERY
Payer: MEDICARE

## 2019-01-30 ENCOUNTER — HOSPITAL ENCOUNTER (INPATIENT)
Facility: MEDICAL CENTER | Age: 54
LOS: 1 days | DRG: 470 | End: 2019-01-31
Attending: ORTHOPAEDIC SURGERY | Admitting: ORTHOPAEDIC SURGERY
Payer: MEDICARE

## 2019-01-30 DIAGNOSIS — G89.18 POST-OP PAIN: ICD-10-CM

## 2019-01-30 DIAGNOSIS — M16.31 OSTEOARTHRITIS RESULTING FROM RIGHT HIP DYSPLASIA: ICD-10-CM

## 2019-01-30 LAB — HIV 1+2 AB+HIV1 P24 AG SERPL QL IA: NON REACTIVE

## 2019-01-30 PROCEDURE — A9270 NON-COVERED ITEM OR SERVICE: HCPCS | Performed by: ANESTHESIOLOGY

## 2019-01-30 PROCEDURE — 700102 HCHG RX REV CODE 250 W/ 637 OVERRIDE(OP): Performed by: ANESTHESIOLOGY

## 2019-01-30 PROCEDURE — 501411 HCHG SPONGE, BABY LAP W/O RINGS: Performed by: ORTHOPAEDIC SURGERY

## 2019-01-30 PROCEDURE — 160002 HCHG RECOVERY MINUTES (STAT): Performed by: ORTHOPAEDIC SURGERY

## 2019-01-30 PROCEDURE — 700101 HCHG RX REV CODE 250: Performed by: PHYSICIAN ASSISTANT

## 2019-01-30 PROCEDURE — 160031 HCHG SURGERY MINUTES - 1ST 30 MINS LEVEL 5: Performed by: ORTHOPAEDIC SURGERY

## 2019-01-30 PROCEDURE — 160048 HCHG OR STATISTICAL LEVEL 1-5: Performed by: ORTHOPAEDIC SURGERY

## 2019-01-30 PROCEDURE — 502578 HCHG PACK, TOTAL HIP: Performed by: ORTHOPAEDIC SURGERY

## 2019-01-30 PROCEDURE — 501838 HCHG SUTURE GENERAL: Performed by: ORTHOPAEDIC SURGERY

## 2019-01-30 PROCEDURE — 160042 HCHG SURGERY MINUTES - EA ADDL 1 MIN LEVEL 5: Performed by: ORTHOPAEDIC SURGERY

## 2019-01-30 PROCEDURE — A9272 DISP WOUND SUCT, DRSG/ACCESS: HCPCS | Performed by: PHYSICIAN ASSISTANT

## 2019-01-30 PROCEDURE — 700105 HCHG RX REV CODE 258: Performed by: PHYSICIAN ASSISTANT

## 2019-01-30 PROCEDURE — A9270 NON-COVERED ITEM OR SERVICE: HCPCS | Performed by: ORTHOPAEDIC SURGERY

## 2019-01-30 PROCEDURE — 502000 HCHG MISC OR IMPLANTS RC 0278: Performed by: ORTHOPAEDIC SURGERY

## 2019-01-30 PROCEDURE — A9270 NON-COVERED ITEM OR SERVICE: HCPCS | Performed by: PHYSICIAN ASSISTANT

## 2019-01-30 PROCEDURE — 87389 HIV-1 AG W/HIV-1&-2 AB AG IA: CPT

## 2019-01-30 PROCEDURE — 72170 X-RAY EXAM OF PELVIS: CPT

## 2019-01-30 PROCEDURE — 700111 HCHG RX REV CODE 636 W/ 250 OVERRIDE (IP): Performed by: ANESTHESIOLOGY

## 2019-01-30 PROCEDURE — 502240 HCHG MISC OR SUPPLY RC 0272: Performed by: ORTHOPAEDIC SURGERY

## 2019-01-30 PROCEDURE — 700101 HCHG RX REV CODE 250

## 2019-01-30 PROCEDURE — 700111 HCHG RX REV CODE 636 W/ 250 OVERRIDE (IP)

## 2019-01-30 PROCEDURE — 160035 HCHG PACU - 1ST 60 MINS PHASE I: Performed by: ORTHOPAEDIC SURGERY

## 2019-01-30 PROCEDURE — 160009 HCHG ANES TIME/MIN: Performed by: ORTHOPAEDIC SURGERY

## 2019-01-30 PROCEDURE — 160036 HCHG PACU - EA ADDL 30 MINS PHASE I: Performed by: ORTHOPAEDIC SURGERY

## 2019-01-30 PROCEDURE — 700102 HCHG RX REV CODE 250 W/ 637 OVERRIDE(OP): Performed by: ORTHOPAEDIC SURGERY

## 2019-01-30 PROCEDURE — 700102 HCHG RX REV CODE 250 W/ 637 OVERRIDE(OP): Performed by: PHYSICIAN ASSISTANT

## 2019-01-30 PROCEDURE — 0SR90JA REPLACEMENT OF RIGHT HIP JOINT WITH SYNTHETIC SUBSTITUTE, UNCEMENTED, OPEN APPROACH: ICD-10-PCS | Performed by: ORTHOPAEDIC SURGERY

## 2019-01-30 PROCEDURE — 700111 HCHG RX REV CODE 636 W/ 250 OVERRIDE (IP): Performed by: PHYSICIAN ASSISTANT

## 2019-01-30 PROCEDURE — 700112 HCHG RX REV CODE 229: Performed by: PHYSICIAN ASSISTANT

## 2019-01-30 PROCEDURE — 770006 HCHG ROOM/CARE - MED/SURG/GYN SEMI*

## 2019-01-30 DEVICE — IMPLANTABLE DEVICE: Type: IMPLANTABLE DEVICE | Site: HIP | Status: FUNCTIONAL

## 2019-01-30 DEVICE — STEM POLAR CEMENTLESS STANDARD TI/HA 1 (1EA): Type: IMPLANTABLE DEVICE | Site: HIP | Status: FUNCTIONAL

## 2019-01-30 DEVICE — IMPLANT R3 3 HOLE ACET SHELL 48MM (1EA): Type: IMPLANTABLE DEVICE | Site: HIP | Status: FUNCTIONAL

## 2019-01-30 DEVICE — IMPLANT REF SPHER HEAD SCREW 35MM (1EA): Type: IMPLANTABLE DEVICE | Site: HIP | Status: FUNCTIONAL

## 2019-01-30 RX ORDER — TAMSULOSIN HYDROCHLORIDE 0.4 MG/1
0.4 CAPSULE ORAL
Status: DISCONTINUED | OUTPATIENT
Start: 2019-01-30 | End: 2019-01-31 | Stop reason: HOSPADM

## 2019-01-30 RX ORDER — HYDROMORPHONE HYDROCHLORIDE 1 MG/ML
0.5 INJECTION, SOLUTION INTRAMUSCULAR; INTRAVENOUS; SUBCUTANEOUS
Status: DISCONTINUED | OUTPATIENT
Start: 2019-01-30 | End: 2019-01-31 | Stop reason: HOSPADM

## 2019-01-30 RX ORDER — HYDROMORPHONE HYDROCHLORIDE 1 MG/ML
0.1 INJECTION, SOLUTION INTRAMUSCULAR; INTRAVENOUS; SUBCUTANEOUS
Status: DISCONTINUED | OUTPATIENT
Start: 2019-01-30 | End: 2019-01-30 | Stop reason: HOSPADM

## 2019-01-30 RX ORDER — DEXAMETHASONE SODIUM PHOSPHATE 4 MG/ML
4 INJECTION, SOLUTION INTRA-ARTICULAR; INTRALESIONAL; INTRAMUSCULAR; INTRAVENOUS; SOFT TISSUE
Status: DISCONTINUED | OUTPATIENT
Start: 2019-01-30 | End: 2019-01-31 | Stop reason: HOSPADM

## 2019-01-30 RX ORDER — POLYETHYLENE GLYCOL 3350 17 G/17G
1 POWDER, FOR SOLUTION ORAL 2 TIMES DAILY PRN
Status: DISCONTINUED | OUTPATIENT
Start: 2019-01-30 | End: 2019-01-31 | Stop reason: HOSPADM

## 2019-01-30 RX ORDER — ENEMA 19; 7 G/133ML; G/133ML
1 ENEMA RECTAL
Status: DISCONTINUED | OUTPATIENT
Start: 2019-01-30 | End: 2019-01-31 | Stop reason: HOSPADM

## 2019-01-30 RX ORDER — PAROXETINE 10 MG/1
10 TABLET, FILM COATED ORAL DAILY
Status: DISCONTINUED | OUTPATIENT
Start: 2019-01-30 | End: 2019-01-31 | Stop reason: HOSPADM

## 2019-01-30 RX ORDER — HYDRALAZINE HYDROCHLORIDE 20 MG/ML
5 INJECTION INTRAMUSCULAR; INTRAVENOUS
Status: DISCONTINUED | OUTPATIENT
Start: 2019-01-30 | End: 2019-01-30 | Stop reason: HOSPADM

## 2019-01-30 RX ORDER — DIPHENHYDRAMINE HYDROCHLORIDE 50 MG/ML
25 INJECTION INTRAMUSCULAR; INTRAVENOUS EVERY 6 HOURS PRN
Status: DISCONTINUED | OUTPATIENT
Start: 2019-01-30 | End: 2019-01-31 | Stop reason: HOSPADM

## 2019-01-30 RX ORDER — AMOXICILLIN 250 MG
1 CAPSULE ORAL NIGHTLY
Status: DISCONTINUED | OUTPATIENT
Start: 2019-01-30 | End: 2019-01-31 | Stop reason: HOSPADM

## 2019-01-30 RX ORDER — TRAMADOL HYDROCHLORIDE 50 MG/1
50 TABLET ORAL EVERY 4 HOURS PRN
Status: DISCONTINUED | OUTPATIENT
Start: 2019-01-30 | End: 2019-01-31 | Stop reason: HOSPADM

## 2019-01-30 RX ORDER — HYDROCHLOROTHIAZIDE 12.5 MG/1
12.5 CAPSULE, GELATIN COATED ORAL
Status: DISCONTINUED | OUTPATIENT
Start: 2019-01-30 | End: 2019-01-31 | Stop reason: HOSPADM

## 2019-01-30 RX ORDER — LISINOPRIL AND HYDROCHLOROTHIAZIDE 12.5; 1 MG/1; MG/1
1 TABLET ORAL DAILY
Status: DISCONTINUED | OUTPATIENT
Start: 2019-01-30 | End: 2019-01-30

## 2019-01-30 RX ORDER — SCOLOPAMINE TRANSDERMAL SYSTEM 1 MG/1
1 PATCH, EXTENDED RELEASE TRANSDERMAL
Status: DISCONTINUED | OUTPATIENT
Start: 2019-01-30 | End: 2019-01-31 | Stop reason: HOSPADM

## 2019-01-30 RX ORDER — SODIUM CHLORIDE, SODIUM LACTATE, POTASSIUM CHLORIDE, CALCIUM CHLORIDE 600; 310; 30; 20 MG/100ML; MG/100ML; MG/100ML; MG/100ML
INJECTION, SOLUTION INTRAVENOUS ONCE
Status: COMPLETED | OUTPATIENT
Start: 2019-01-30 | End: 2019-01-30

## 2019-01-30 RX ORDER — CELECOXIB 200 MG/1
200 CAPSULE ORAL 2 TIMES DAILY WITH MEALS
Status: DISCONTINUED | OUTPATIENT
Start: 2019-01-31 | End: 2019-01-31 | Stop reason: HOSPADM

## 2019-01-30 RX ORDER — DIPHENHYDRAMINE HCL 25 MG
25 TABLET ORAL NIGHTLY PRN
Status: DISCONTINUED | OUTPATIENT
Start: 2019-01-31 | End: 2019-01-31 | Stop reason: HOSPADM

## 2019-01-30 RX ORDER — AMOXICILLIN 250 MG
1 CAPSULE ORAL
Status: DISCONTINUED | OUTPATIENT
Start: 2019-01-30 | End: 2019-01-31 | Stop reason: HOSPADM

## 2019-01-30 RX ORDER — SODIUM CHLORIDE, SODIUM LACTATE, POTASSIUM CHLORIDE, CALCIUM CHLORIDE 600; 310; 30; 20 MG/100ML; MG/100ML; MG/100ML; MG/100ML
INJECTION, SOLUTION INTRAVENOUS CONTINUOUS
Status: DISCONTINUED | OUTPATIENT
Start: 2019-01-30 | End: 2019-01-30 | Stop reason: HOSPADM

## 2019-01-30 RX ORDER — OXYCODONE HCL 5 MG/5 ML
10 SOLUTION, ORAL ORAL
Status: COMPLETED | OUTPATIENT
Start: 2019-01-30 | End: 2019-01-30

## 2019-01-30 RX ORDER — DOCUSATE SODIUM 100 MG/1
100 CAPSULE, LIQUID FILLED ORAL 2 TIMES DAILY
Status: DISCONTINUED | OUTPATIENT
Start: 2019-01-30 | End: 2019-01-31 | Stop reason: HOSPADM

## 2019-01-30 RX ORDER — CELECOXIB 200 MG/1
200 CAPSULE ORAL ONCE
Status: COMPLETED | OUTPATIENT
Start: 2019-01-30 | End: 2019-01-30

## 2019-01-30 RX ORDER — OXYCODONE HCL 5 MG/5 ML
5 SOLUTION, ORAL ORAL
Status: COMPLETED | OUTPATIENT
Start: 2019-01-30 | End: 2019-01-30

## 2019-01-30 RX ORDER — DEXAMETHASONE SODIUM PHOSPHATE 4 MG/ML
8 INJECTION, SOLUTION INTRA-ARTICULAR; INTRALESIONAL; INTRAMUSCULAR; INTRAVENOUS; SOFT TISSUE ONCE
Status: COMPLETED | OUTPATIENT
Start: 2019-01-31 | End: 2019-01-31

## 2019-01-30 RX ORDER — ACETAMINOPHEN 500 MG
1000 TABLET ORAL ONCE
Status: COMPLETED | OUTPATIENT
Start: 2019-01-30 | End: 2019-01-30

## 2019-01-30 RX ORDER — KETOROLAC TROMETHAMINE 30 MG/ML
15 INJECTION, SOLUTION INTRAMUSCULAR; INTRAVENOUS EVERY 6 HOURS
Status: DISCONTINUED | OUTPATIENT
Start: 2019-01-30 | End: 2019-01-31 | Stop reason: HOSPADM

## 2019-01-30 RX ORDER — ONDANSETRON 2 MG/ML
4 INJECTION INTRAMUSCULAR; INTRAVENOUS EVERY 4 HOURS PRN
Status: DISCONTINUED | OUTPATIENT
Start: 2019-01-30 | End: 2019-01-31 | Stop reason: HOSPADM

## 2019-01-30 RX ORDER — ONDANSETRON 2 MG/ML
4 INJECTION INTRAMUSCULAR; INTRAVENOUS
Status: COMPLETED | OUTPATIENT
Start: 2019-01-30 | End: 2019-01-30

## 2019-01-30 RX ORDER — TRANEXAMIC ACID
POWDER (GRAM) MISCELLANEOUS
Status: DISCONTINUED | OUTPATIENT
Start: 2019-01-30 | End: 2019-01-30 | Stop reason: HOSPADM

## 2019-01-30 RX ORDER — HYDROMORPHONE HYDROCHLORIDE 1 MG/ML
0.4 INJECTION, SOLUTION INTRAMUSCULAR; INTRAVENOUS; SUBCUTANEOUS
Status: DISCONTINUED | OUTPATIENT
Start: 2019-01-30 | End: 2019-01-30 | Stop reason: HOSPADM

## 2019-01-30 RX ORDER — LEVOTHYROXINE SODIUM 0.12 MG/1
125 TABLET ORAL
Status: DISCONTINUED | OUTPATIENT
Start: 2019-01-31 | End: 2019-01-31 | Stop reason: HOSPADM

## 2019-01-30 RX ORDER — GABAPENTIN 300 MG/1
300 CAPSULE ORAL ONCE
Status: COMPLETED | OUTPATIENT
Start: 2019-01-30 | End: 2019-01-30

## 2019-01-30 RX ORDER — HYDROMORPHONE HYDROCHLORIDE 1 MG/ML
0.2 INJECTION, SOLUTION INTRAMUSCULAR; INTRAVENOUS; SUBCUTANEOUS
Status: DISCONTINUED | OUTPATIENT
Start: 2019-01-30 | End: 2019-01-30 | Stop reason: HOSPADM

## 2019-01-30 RX ORDER — OXYCODONE HYDROCHLORIDE 10 MG/1
10 TABLET ORAL
Status: DISCONTINUED | OUTPATIENT
Start: 2019-01-30 | End: 2019-01-31 | Stop reason: HOSPADM

## 2019-01-30 RX ORDER — OXYCODONE HYDROCHLORIDE 5 MG/1
5 TABLET ORAL
Status: DISCONTINUED | OUTPATIENT
Start: 2019-01-30 | End: 2019-01-31 | Stop reason: HOSPADM

## 2019-01-30 RX ORDER — BISACODYL 10 MG
10 SUPPOSITORY, RECTAL RECTAL
Status: DISCONTINUED | OUTPATIENT
Start: 2019-01-30 | End: 2019-01-31 | Stop reason: HOSPADM

## 2019-01-30 RX ORDER — HALOPERIDOL 5 MG/ML
1 INJECTION INTRAMUSCULAR
Status: DISCONTINUED | OUTPATIENT
Start: 2019-01-30 | End: 2019-01-30 | Stop reason: HOSPADM

## 2019-01-30 RX ORDER — DIPHENHYDRAMINE HCL 25 MG
25 TABLET ORAL EVERY 6 HOURS PRN
Status: DISCONTINUED | OUTPATIENT
Start: 2019-01-30 | End: 2019-01-31 | Stop reason: HOSPADM

## 2019-01-30 RX ORDER — KETOROLAC TROMETHAMINE 30 MG/ML
INJECTION, SOLUTION INTRAMUSCULAR; INTRAVENOUS
Status: DISCONTINUED | OUTPATIENT
Start: 2019-01-30 | End: 2019-01-30 | Stop reason: HOSPADM

## 2019-01-30 RX ORDER — ONDANSETRON 4 MG/1
4 TABLET, ORALLY DISINTEGRATING ORAL EVERY 4 HOURS PRN
Status: DISCONTINUED | OUTPATIENT
Start: 2019-01-30 | End: 2019-01-31 | Stop reason: HOSPADM

## 2019-01-30 RX ORDER — LISINOPRIL 5 MG/1
10 TABLET ORAL
Status: DISCONTINUED | OUTPATIENT
Start: 2019-01-30 | End: 2019-01-31 | Stop reason: HOSPADM

## 2019-01-30 RX ORDER — BUPIVACAINE HYDROCHLORIDE AND EPINEPHRINE 2.5; 5 MG/ML; UG/ML
INJECTION, SOLUTION INFILTRATION; PERINEURAL
Status: DISCONTINUED | OUTPATIENT
Start: 2019-01-30 | End: 2019-01-30 | Stop reason: HOSPADM

## 2019-01-30 RX ORDER — HALOPERIDOL 5 MG/ML
1 INJECTION INTRAMUSCULAR EVERY 6 HOURS PRN
Status: DISCONTINUED | OUTPATIENT
Start: 2019-01-30 | End: 2019-01-31 | Stop reason: HOSPADM

## 2019-01-30 RX ORDER — ACETAMINOPHEN 500 MG
500 TABLET ORAL EVERY 6 HOURS
Status: DISCONTINUED | OUTPATIENT
Start: 2019-01-30 | End: 2019-01-31 | Stop reason: HOSPADM

## 2019-01-30 RX ADMIN — ACETAMINOPHEN 500 MG: 500 TABLET, FILM COATED ORAL at 18:24

## 2019-01-30 RX ADMIN — FENTANYL CITRATE 25 MCG: 50 INJECTION, SOLUTION INTRAMUSCULAR; INTRAVENOUS at 14:56

## 2019-01-30 RX ADMIN — ASPIRIN 81 MG: 81 TABLET, COATED ORAL at 22:07

## 2019-01-30 RX ADMIN — ACETAMINOPHEN 1000 MG: 500 TABLET, COATED ORAL at 11:03

## 2019-01-30 RX ADMIN — KETOROLAC TROMETHAMINE 15 MG: 30 INJECTION, SOLUTION INTRAMUSCULAR at 18:25

## 2019-01-30 RX ADMIN — CELECOXIB 200 MG: 200 CAPSULE ORAL at 11:03

## 2019-01-30 RX ADMIN — TAMSULOSIN HYDROCHLORIDE 0.4 MG: 0.4 CAPSULE ORAL at 18:25

## 2019-01-30 RX ADMIN — ACETAMINOPHEN 500 MG: 500 TABLET, FILM COATED ORAL at 23:45

## 2019-01-30 RX ADMIN — TRANEXAMIC ACID 1000 MG: 100 INJECTION, SOLUTION INTRAVENOUS at 14:48

## 2019-01-30 RX ADMIN — GABAPENTIN 300 MG: 300 CAPSULE ORAL at 11:03

## 2019-01-30 RX ADMIN — DOCUSATE SODIUM AND SENNOSIDES 1 TABLET: 8.6; 5 TABLET, FILM COATED ORAL at 22:07

## 2019-01-30 RX ADMIN — ONDANSETRON 4 MG: 2 INJECTION INTRAMUSCULAR; INTRAVENOUS at 14:25

## 2019-01-30 RX ADMIN — SODIUM CHLORIDE, SODIUM LACTATE, POTASSIUM CHLORIDE, CALCIUM CHLORIDE: 600; 310; 30; 20 INJECTION, SOLUTION INTRAVENOUS at 10:45

## 2019-01-30 RX ADMIN — KETOROLAC TROMETHAMINE 15 MG: 30 INJECTION, SOLUTION INTRAMUSCULAR at 23:45

## 2019-01-30 RX ADMIN — SODIUM CHLORIDE 2 G: 9 INJECTION, SOLUTION INTRAVENOUS at 21:50

## 2019-01-30 RX ADMIN — TRAMADOL HYDROCHLORIDE 25 MG: 50 TABLET, FILM COATED ORAL at 22:07

## 2019-01-30 ASSESSMENT — LIFESTYLE VARIABLES
EVER_SMOKED: NEVER
ALCOHOL_USE: NO

## 2019-01-30 ASSESSMENT — COGNITIVE AND FUNCTIONAL STATUS - GENERAL
SUGGESTED CMS G CODE MODIFIER DAILY ACTIVITY: CJ
SUGGESTED CMS G CODE MODIFIER MOBILITY: CJ
WALKING IN HOSPITAL ROOM: A LITTLE
DAILY ACTIVITIY SCORE: 20
MOVING FROM LYING ON BACK TO SITTING ON SIDE OF FLAT BED: A LITTLE
MOBILITY SCORE: 21
DRESSING REGULAR LOWER BODY CLOTHING: A LOT
CLIMB 3 TO 5 STEPS WITH RAILING: A LITTLE
HELP NEEDED FOR BATHING: A LITTLE
TOILETING: A LITTLE

## 2019-01-30 ASSESSMENT — PATIENT HEALTH QUESTIONNAIRE - PHQ9
1. LITTLE INTEREST OR PLEASURE IN DOING THINGS: NOT AT ALL
2. FEELING DOWN, DEPRESSED, IRRITABLE, OR HOPELESS: NOT AT ALL
SUM OF ALL RESPONSES TO PHQ9 QUESTIONS 1 AND 2: 0

## 2019-01-30 NOTE — OR NURSING
"1347 To PACU from OR via bed,side rails up x 3 for safety, lungs clear bilaterally, scds on patient and machine operational, obstructed breathing noted and Dr Quene performing chin lift with stimulation. Pt taking short, infrequent breaths with stimulation. RN switched to mask at 15L.   1350 Dr Zepeda remains here as patient starting to breathe without stimulation. Head positioned to alleviate obstruction. Pt does not respond verbally to RN/MD. Prevena wound vac dressing CDI to R hip with operational maching; no drainage to tubing or canister. +2 R pedal pulse, pink/warm toes and <3 sec cap refill.   1405 Pt breathing easy and unlabored without obstructing. Pt arouses to touch and denies nausea or pain. CMS intact to RLE; pt able to dorsiflex R foot.   1420 Pt arouses to voice and complains \"I'm hot\". Removed blankets and don hugger robe. Placed in cotton gown for comfort. No socks on feet. Denies pain but c/o mild nausea.   1435 Pt continues to c/o being hot and \"I'm dizzy\". VS WNL. Denies pain or nausea. Removed cotton gown and covered with draped sheet; pt reports comfort. Ice pack remains to posterior neck and cool washcloth to chest. Temp remains 36.5C temporally. Pt given mouth swabs for c/o dry mouth.    1450 Xray completed and Tranexamic acid started IVPB as ordered. Pt c/o pain with movement and reports as 4-5/10 and not tolerable. Slightly restless on bed; plan to give IV Fentanyl for comfort. Pt denies nausea after sip of water. Pt reports dizziness improved.   1505 Pt breathing easy and unlabored and resting quietly on bed. Denies pain to R hip. RN had discussion with patient that pain is to be expected and normal and goal is tolerable pain; not zero pain due to risk of apnea and sedation. Pt states verbal understanding.   1520 Pt arouses easily to voice and responds appropriately. Denies nausea or pain. Breathing easy and unlabored.   1535 RR 8-10. Pt resting quietly on bed; arouses spontaneously. " Breathing easy and unlabored.   1550 Pt meets criteria for transfer to [facility]. Pain rated as tolerable at 4-5/10.

## 2019-01-30 NOTE — OP REPORT
Date of Surgery:  1-30-19  Pre-operative Diagnosis:  right hip dysplastic arthritis     Post-operative Diagnosis:  right hip dysplastic arthritis    Procedure:  right hip replacement    Implants used:  A Smith Nephew total hip arthroplasty system with the following components  Acetabulum: 48 mm R3  Femur: Size 2 standard Polarstem  Bearing: Biolox Delta Ceramic  Head: 32 mm +4 biolox delta  Screws: 1    Surgeon:  Janet Davis MD    1st Assistant:   SID Deras    Anesthesiologist:   Bernice    EBL:  [id number]    Specimen:  none    Findings:  Severe arthritic changes, shallow, dysplastic acetabulum    Indications for procedure:  This patient is a 53 y.o. female with a long standing history of right hip arthritis. The patient had failed conservative therapy including anti-inflammatory medications, activity modifications, injections, physical therapy and assistive devices. The patient was indicated for a right total hip replacement. The patient expressed an understanding of the risks of pain, bleeding, infection, dislocation,  need for further surgery, damage to surrounding structures, neurovascular compromise, blood clots, leg-length discrepancy both apparent and actual, anesthetic complications, and serious medical consequences including but not limited to heart attack, stroke or even death. It was explained that the implants are man-made products and thus subject to possible failure.  The patient expressed an understanding and wished to proceed. Specific risks based on the patient's medical history were addressed. Given her young age and activity level, she was deemed to be a suitable candidate for a ceramic on ceramic bearing, and she met with our research coordinators regarding this. She was apprised of the possible risks of implant breakage or squeaking.  A clearance was obtained by the medical physicians and the patient was taken to the operating room on the day of surgery for the above named procedure.      Description of procedure:  The patient was met in the pre-operative holding area. The right hip was marked as the appropriate surgical site with indelible ink. They were taken to the operating room where the anesthesia department started a GETA for intraoperative and post-operative anesthesia and pain control. The patient was turned with the operative hip facing up. All bony prominences were padded appropriately. The right hip was prepped and draped in a standard sterile surgical fashion. A time out procedure was called to verify the side and site of surgery, the proposed surgical procedure, and the administration of pre-operative antibiotics. After successful completion of the time out procedure, our attention was turned to the right hip.     A straight incision was made centered on the greater trochanter. This was carried down through the subcutaneous tissue with the assistance of the Bovie electrocautery and the self-retaining retractors. The fascia was identified and cleared with a Lama elevator. This was incised in line with its fibers and the Charnley self-retaining retractor was placed. The hip was internally rotated and the external rotators were taken down. The piriformis was identified and tagged for later repair. The abductor were retracted anteriorly and protected. The posterior capsule was identified, cleared, and incised in an L-capsulotomy fashion. The corner of the L was tagged for later repair. The hip was able to be dislocated. We noted evidence of severe arthritic changes including cartilage loss and osteophytic overgrowth. The femoral neck was cleared and our neck cut was made in line with our previously templated images using a femoral neck cutting guide. The femoral head was removed, the femur was elevated out of the wound and we began our preparation of the femur, first with a box osteotome followed by a canal finder by hand then a lateralizing reamer on power. The femur was then broached  to a size 2 at which point we noted excellent calcar fit and rotational stability. The broach was removed and the femoral canal was packed with a baby lap sponge. The femur was retracted anteriorly and our attention was turned to the acetabulum.    The acetabulum was exposed and soft tissue was removed from its rim. We noted a shallow acetabulum with complete loss of articular cartilage. The straight reamer was used to obtain proper medialization and then the offset reamer was used to ream up to a size 48 at which point we noted an appropriate bony bed for implantation. The acetabulum was irrigated.  A size 48 mm acetabular component was then opened in a sterile fashion and impacted into place in the proper amount of abduction and anteversion. 1 acetabular bone screw was placed for adjunctive fixation. The Biolox Delta bearing surface was inserted and impacted into place. This was tested for proper seating and the retractors were removed and our attention was turned back to the femur.    The femur was elevated and exposed properly. The baby lap sponge was removed and the femoral canal was thoroughly irrigated. A trial stem was assembled, and we trialed both the +0 and the +4 heads.  With the +0 in place we noted a recreation of her preoperative leg length discrepancy and poor soft tissue tension With the +4 head in place we noted excellent restoration of leg length, offset and stability. The hip was stable past 90 degrees of flexion and past 60 degrees of internal rotation. It did not dislocate with extension and external rotation. Abductor tension was appropriate. At this point the hip was dislocated, the trial components were removed and the femur was irrigated. The real stem was opened in a sterile fashion on the back table and then impacted into the femur. Another trial reduction was made and again we noted excellent restoration of leg length, offset and stability. At this point the real head was opened and  impacted onto the taper, which had been cleaned and dried thoroughly. Another reduction was made and again we noted excellent restoration of leg length, offset and stability.    Therefore a dilute betadine solution with 3 grams of tranexamic acid was instilled for 3 minutes before being pulse-lavaged out. The posterior capsule was closed using the previously placed tag stitches. The piriformis was closed as a separate structure. The fascia was closed with #1 Vicryl in an interrupted figure of 8 fashion and then oversewn with a running #1 PDS. The deep subcutaneous tissue was closed with a running #1 PDS. The deep dermal layer was closed with 2-0 Vicryl in a buried interrupted fashion. The skin was closed with running 3-0 Monocryl and a sterile dressing was applied. The patient is currently in the operating room awaiting reversal of anesthesia. Appropriate DVT prophylaxis and perioperative antibiotics will be ordered. All sponge and instrument counts were correct prior to final wound closure.

## 2019-01-31 ENCOUNTER — APPOINTMENT (OUTPATIENT)
Dept: RADIOLOGY | Facility: MEDICAL CENTER | Age: 54
DRG: 470 | End: 2019-01-31
Attending: ORTHOPAEDIC SURGERY
Payer: MEDICARE

## 2019-01-31 VITALS
OXYGEN SATURATION: 99 % | BODY MASS INDEX: 31.05 KG/M2 | SYSTOLIC BLOOD PRESSURE: 107 MMHG | TEMPERATURE: 97.7 F | RESPIRATION RATE: 18 BRPM | HEART RATE: 67 BPM | WEIGHT: 181.88 LBS | DIASTOLIC BLOOD PRESSURE: 60 MMHG | HEIGHT: 64 IN

## 2019-01-31 PROBLEM — G89.18 POST-OP PAIN: Status: ACTIVE | Noted: 2019-01-31

## 2019-01-31 PROBLEM — M16.31 OSTEOARTHRITIS RESULTING FROM RIGHT HIP DYSPLASIA: Status: ACTIVE | Noted: 2019-01-31

## 2019-01-31 LAB
HCT VFR BLD AUTO: 31.9 % (ref 37–47)
HGB BLD-MCNC: 10.5 G/DL (ref 12–16)

## 2019-01-31 PROCEDURE — 97535 SELF CARE MNGMENT TRAINING: CPT

## 2019-01-31 PROCEDURE — 700105 HCHG RX REV CODE 258: Performed by: PHYSICIAN ASSISTANT

## 2019-01-31 PROCEDURE — 85014 HEMATOCRIT: CPT

## 2019-01-31 PROCEDURE — 700102 HCHG RX REV CODE 250 W/ 637 OVERRIDE(OP): Performed by: PHYSICIAN ASSISTANT

## 2019-01-31 PROCEDURE — 36415 COLL VENOUS BLD VENIPUNCTURE: CPT

## 2019-01-31 PROCEDURE — 97165 OT EVAL LOW COMPLEX 30 MIN: CPT

## 2019-01-31 PROCEDURE — 97162 PT EVAL MOD COMPLEX 30 MIN: CPT

## 2019-01-31 PROCEDURE — A9270 NON-COVERED ITEM OR SERVICE: HCPCS | Performed by: PHYSICIAN ASSISTANT

## 2019-01-31 PROCEDURE — 700111 HCHG RX REV CODE 636 W/ 250 OVERRIDE (IP): Performed by: PHYSICIAN ASSISTANT

## 2019-01-31 PROCEDURE — 85018 HEMOGLOBIN: CPT

## 2019-01-31 PROCEDURE — 73502 X-RAY EXAM HIP UNI 2-3 VIEWS: CPT | Mod: RT

## 2019-01-31 RX ORDER — TRAMADOL HYDROCHLORIDE 50 MG/1
50-100 TABLET ORAL EVERY 8 HOURS PRN
Qty: 60 TAB | Refills: 1 | Status: SHIPPED | OUTPATIENT
Start: 2019-01-31 | End: 2019-02-14

## 2019-01-31 RX ORDER — ASPIRIN 81 MG/1
81 TABLET ORAL 2 TIMES DAILY
Qty: 60 TAB | Refills: 0 | Status: SHIPPED | OUTPATIENT
Start: 2019-01-31 | End: 2019-06-06

## 2019-01-31 RX ORDER — ACETAMINOPHEN 500 MG
500 TABLET ORAL EVERY 6 HOURS
Qty: 60 TAB | Refills: 1 | Status: SHIPPED | OUTPATIENT
Start: 2019-01-31 | End: 2022-08-30

## 2019-01-31 RX ORDER — PSEUDOEPHEDRINE HCL 30 MG
100 TABLET ORAL 2 TIMES DAILY
Qty: 60 CAP | Refills: 0 | Status: SHIPPED | OUTPATIENT
Start: 2019-01-31 | End: 2019-06-06

## 2019-01-31 RX ORDER — OXYCODONE HYDROCHLORIDE 5 MG/1
5 TABLET ORAL EVERY 8 HOURS PRN
Qty: 60 TAB | Refills: 0 | Status: SHIPPED | OUTPATIENT
Start: 2019-01-31 | End: 2019-02-14

## 2019-01-31 RX ADMIN — KETOROLAC TROMETHAMINE 15 MG: 30 INJECTION, SOLUTION INTRAMUSCULAR at 06:07

## 2019-01-31 RX ADMIN — DEXAMETHASONE SODIUM PHOSPHATE 8 MG: 4 INJECTION, SOLUTION INTRAMUSCULAR; INTRAVENOUS at 06:09

## 2019-01-31 RX ADMIN — ASPIRIN 81 MG: 81 TABLET, COATED ORAL at 06:10

## 2019-01-31 RX ADMIN — PAROXETINE HYDROCHLORIDE 10 MG: 10 TABLET, FILM COATED ORAL at 06:10

## 2019-01-31 RX ADMIN — TRAMADOL HYDROCHLORIDE 25 MG: 50 TABLET, FILM COATED ORAL at 11:51

## 2019-01-31 RX ADMIN — SODIUM CHLORIDE 2 G: 9 INJECTION, SOLUTION INTRAVENOUS at 03:12

## 2019-01-31 RX ADMIN — ACETAMINOPHEN 500 MG: 500 TABLET, FILM COATED ORAL at 06:10

## 2019-01-31 RX ADMIN — TRAMADOL HYDROCHLORIDE 25 MG: 50 TABLET, FILM COATED ORAL at 07:36

## 2019-01-31 RX ADMIN — LEVOTHYROXINE SODIUM 125 MCG: 125 TABLET ORAL at 06:10

## 2019-01-31 RX ADMIN — ACETAMINOPHEN 500 MG: 500 TABLET, FILM COATED ORAL at 11:51

## 2019-01-31 ASSESSMENT — GAIT ASSESSMENTS
ASSISTIVE DEVICE: FRONT WHEEL WALKER
DISTANCE (FEET): 150
DEVIATION: ANTALGIC;STEP TO
GAIT LEVEL OF ASSIST: STAND BY ASSIST

## 2019-01-31 ASSESSMENT — ACTIVITIES OF DAILY LIVING (ADL): TOILETING: INDEPENDENT

## 2019-01-31 NOTE — DISCHARGE INSTRUCTIONS
Discharge Patient Start: 01/31/19 0655, End: 01/31/19 0655, ONCE, ROUTINE     Comments: Dr. Davis's Discharge Instructions:   The first week after your joint replacement is a time to recover from the surgery. We expect light exercise to keep you active and mobile. Dr. Davis requires a walker or cane for most patients in the first few days following surgery to try to prevent falls or complications.     Most patients are prescribed two medications for pain control: a narcotic such as Oxycodone or Hydrocodone and a milder medication called Tramadol. These can be alternated for pain control, and the priority is to decrease use of the stronger narcotic as soon as tolerated.   Take your pain medication as appropriate to ensure that your pain is not limiting your recovery. You'll be seen in clinic in 7-10 days (this appointment has already been made, call our office if you're unsure of the time). At that time, we'll prescribe your physical therapy to help with the recovery phase.     -Keep dressing clean and dry. Leave dressing in place until follow up. If you have an incisional vac dressing, the battery may die before your first post operative appointment, but you can leave the surgical dressing in place and it will be removed at your clinic visit. The battery of the dressing may die, and the sponge will inflate because it is no longer holding suction. You can still leave the dressing in place and it will be removed at the office. Call the office if you notice drainage from the surgical dressing.     -OK to shower, keep dressing in place. Pat dressing dry, do not rub incision     -Do not soak incision in bath, hot tub or pool     -Follow up with Dr. Davis at regularly scheduled time     -Call [facility] office at [phone] for questions     -Weight bearing status: as tolerated with walker/cane and posterior hip precautions     -Take medication as prescribed for DVT prophylaxis        Discharge Instructions    Discharged  to home by car with relative. Discharged via wheelchair, hospital escort: Yes.  Special equipment needed: Walker    Be sure to schedule a follow-up appointment with your primary care doctor or any specialists as instructed.     Discharge Plan:   Smoking Cessation Offered: Patient Refused  Influenza Vaccine Indication: Not indicated: Previously immunized this influenza season and > 8 years of age    I understand that a diet low in cholesterol, fat, and sodium is recommended for good health. Unless I have been given specific instructions below for another diet, I accept this instruction as my diet prescription.   Other diet: regular    Special Instructions: Discharge instructions for the Orthopedic Patient    Follow up with Primary Care Physician within 2 weeks of discharge to home, regarding:  Review of medications and diagnostic testing.  Surveillance for medical complications.  Workup and treatment of osteoporosis, if appropriate.     -Is this a Joint Replacement patient? Yes   Total Joint Hip Replacement Discharge Instructions    Pain  - The goal is to slowly wean off the prescription pain medicine.  - Ice can be used for pain control.  20 minutes at a time is recommended, and never directly against your skin or incision.  - Most patients are off the pain pills by 3 weeks; others may require a low level of pain medications for many months. If your pain continues to be severe, follow up with your physician.  Infection  Deep hip joint infections that require removal of the prostheses occur in less than 0.1% of patients. Lesser infections in the skin (cellulites) are more common and much more easily treated.  - Keep the incision as clean and dry as possible.  - Always wash your hands before touching your incision.  - Skin infections tend to develop around 7-10 days after surgery, most can be treated with oral antibiotics.  - Dental Care should be delayed for 3 months after surgery, your surgeon recommends taking a  dose of antibiotics 1 hour prior to any dental procedure.  After 2 years, most surgeons recommend antibiotics only before an extensive procedure.  Ask your surgeon what he recommends.  - Signs and symptoms of infection can include:  low grade fever, redness, pain, swelling and drainage from your incision.  Notify your surgeon immediately if you develop any of these symptoms.  Post op Disturbances  - Bowel habits - constipation is extremely common and is caused by a combination of anesthesia, lack of mobility and pain medicine.  Use stool softeners or laxatives if necessary. It is important not to ignore this problem, as bowel obstructions can be a serious complication after joint replacement surgery.  - Mood/Energy Level - Many patients experience a lack of energy and endurance for up to 2-3 months after surgery.  Some may also feel down and can even become depressed.  This is likely due to the postoperative anemia, change in activity level, lack of sleep, pain medicine and just the emotional reaction to the surgery itself that is a big disruption in a person’s life.  This usually passes.  If symptoms persist, follow up with your primary physician.  - Returning to work - Your surgeon will give you more specific instructions.  Generally, if you work a sedentary job requiring little standing or walking, most patients may return within 2-6 weeks.  Manual labor jobs involving walking, lifting and standing may take 3-4 months.  Your surgeon’s office can provide a release to part-time or light duty work early on in your recovery and progress you to full duty as able.  - Driving - You can begin driving an automatic shift car in 4 to 8 weeks, provided you are no longer taking narcotic pain medication. If you have a stick-shift car and your right hip was replaced, do not begin driving until your doctor says you can.   - Avoiding falls -  throw rugs and tack down loose carpeting.  Be aware of floor hazards such as  pets, small objects or uneven surfaces.   -  Airport Metal Detectors - The sensitivity of metal detectors varies and it is likely that your prosthesis will cause an alarm. Inform the  that you have an artificial joint.  Diet  - Resume your normal diet as tolerated.  - It is important to achieve a healthy nutritional status by eating a well balanced diet on a regular basis.  - Your physician may recommend that you take iron and vitamin supplements.   - Continue to drink plenty of fluids.  Shower/Bathing  - You may shower as soon as you get home from the hospital unless otherwise instructed.  - Keep your incision out of water.  To keep the incision dry when showering, cover it with a plastic bag or plastic wrap.  - Pat incision dry if it gets wet.  Don’t rub.  - Do not submerge in a bath until staples are out and the incision is completely healed. (Approximately 6-8 weeks after surgery).  Dressing Change:  Procedure (if recommended by your physician)  - Wash hands.  - Open all dressing change materials.  - Remove old dressing and discard.  - Inspect incision for redness, increase in clear drainage, yellow/green drainage, odor and surrounding skin hot to touch.  -  ABD (large gauze) pad by one corner and lay over the incision.  Be careful not to touch the inside of the dressing that will lay over the incision.  - Secure in place as instructed (Ace wrap or tape).    Swelling/Bruising  - Swelling is normal after hip replacement and can involve the thigh, knee, calf and foot.  - Swelling can last from 3-6 months.  - Elevate your leg higher than your heart while reclining.  The first week you are home you should elevate your leg an equal amount of time, as you are active.    - Anti-inflammatory pills can be taken once you have stopped the blood thinners.  - The swelling is usually worse after you go home since you are upright for longer periods of time.  - Bruising is common and can involve the  entire leg including the thigh, calf and even foot.  Bruising often does not appear until after you arrive home and it can be quite dramatic- purple, black, green.  The bruising you can see is not usually concerning and will subside without any treatment.      Blood Clot Prevention  Blood clots in the legs and the less common, but frightening, clots that travel to the lungs are a real focus of our preventative. Most patients are at standard risk for them, but those patients who are at higher risk include people who have had previous clots, a family history of clotting, smoking, diabetes, obesity, advanced age, use of estrogen and a sedentary lifestyle.    - Signs of blood clots in legs - Swelling in thigh, calf or ankle that does not go down with elevation.  Pain, heat and tenderness in calf, back of calf or groin area.  NOTE: blood clots can occur in either leg.  - You have been receiving anticoagulant therapy (blood thinners) in the hospital and you may be instructed to continue at home depending on your risk factors.  - Your risk for developing a clot continues for up to 2-3 months after surgery.  You should avoid prolonged sitting and dehydration during that time (long air trips and car trips).  If you do take a trip during this time, please get up and move around every 1- 1.5 hours.  - If you are prescribed blood thinning medication for home, follow instructions as directed. (Handouts provided if applicable).      Activity    Once you get home, you should stay active. The key is not to overdo it! While you can expect some good days and some bad days, you should notice a gradual improvement over time you should notice a gradual improvement and a gradual increase in your endurance over the next 6 to 12 months.    - Weight Bearing - If you have undergone cemented or hybrid hip replacement, you can put some weight on the leg immediately using a cane or walker, and you should continue to use some support for 4 to 6  weeks to help the muscles recover.   - Sleeping Positions - Sleep on your back with your legs slightly apart or on your side with a regular pillow between your knees. Be sure to use the pillow for at least 6 weeks, or until your doctor says you can do without it. Sleeping on your stomach should be all right  - Sitting - For at least the first 3 months, sit only in chairs that have arms. Do not sit on low chairs, low stools, or reclining chairs. Do not cross your legs at the knees. The physical therapist will show you how to sit and stand from a chair, keeping your affected leg out in front of you. Get up and move around on a regular basis--at least once every hour.  - Walking - Walk as much as you like once your doctor gives you the go-ahead, but remember that walking is no substitute for your prescribed exercises. Walking with a pair of trekking poles is helpful and adds as much as 40% to the exercise you get when you walk  - Therapy may be needed in some cases, to strengthen your muscles and improve your gait (walking pattern).  This decision will be made at your post-operative appointment.  Follow your therapist recommended post-operative exercises (handout provided by Therapist).  - Swimming is also recommended; you can begin as soon as the sutures have been removed and the wound is healed, approximately 6 to 8 weeks after surgery. Using a pair of training fins may make swimming a more enjoyable and effective exercise.  - Other activities - Lower impact activities are preferred.  If you have specific questions, consult your Surgeon.    - Sexual activity - Your surgeon can tell you when it should be safe to resume sexual activity.      When to Call the Doctor   Call the physician if:   - Fever over 100.5? F  - Increased pain, drainage, redness, odor or heat around the incision area  - Shaking chills  - Increased knee pain with activity and rest  - Increased pain in calf, tenderness or redness above or below the  knee  - Increased swelling of calf, ankle, foot  - Sudden increased shortness of breath, sudden onset of chest pain, localized chest pain with coughing  - Incision opening  Or, if there are any questions or concerns about medications or care.       -Is this patient being discharged with medication to prevent blood clots?  Yes, Aspirin Aspirin, ASA oral tablets  What is this medicine?  ASPIRIN (AS pir in) is a pain reliever. It is used to treat mild pain and fever. This medicine is also used as directed by a doctor to prevent and to treat heart attacks, to prevent strokes, and to treat arthritis or inflammation.  This medicine may be used for other purposes; ask your health care provider or pharmacist if you have questions.  COMMON BRAND NAME(S): Aspir-Low, Aspir-Sara, Aspirtab, Dajuan Advanced Aspirin, Dajuan Aspirin, [employer] Group Aspirin Extra Strength, Dajuan Aspirin Plus, Dajuan Extra Strength, Dajuan Extra Strength Plus, [employer] Group Genuine Aspirin, Bitfury Group Womens Aspirin, Bufferin, Bufferin Extra Strength, Bufferin Low Dose  What should I tell my health care provider before I take this medicine?  They need to know if you have any of these conditions:  -anemia  -asthma  -bleeding problems  -child with chickenpox, the flu, or other viral infection  -diabetes  -gout  -if you frequently drink alcohol containing drinks  -kidney disease  -liver disease  -low level of vitamin K  -lupus  -smoke tobacco  -stomach ulcers or other problems  -an unusual or allergic reaction to aspirin, tartrazine dye, other medicines, dyes, or preservatives  -pregnant or trying to get pregnant  -breast-feeding  How should I use this medicine?  Take this medicine by mouth with a glass of water. Follow the directions on the package or prescription label. You can take this medicine with or without food. If it upsets your stomach, take it with food. Do not take your medicine more often than directed.  Talk to your pediatrician regarding the use of this medicine  in children. While this drug may be prescribed for children as young as 12 years of age for selected conditions, precautions do apply. Children and teenagers should not use this medicine to treat chicken pox or flu symptoms unless directed by a doctor.  Patients over 65 years old may have a stronger reaction and need a smaller dose.  Overdosage: If you think you have taken too much of this medicine contact a poison control center or emergency room at once.  NOTE: This medicine is only for you. Do not share this medicine with others.  What if I miss a dose?  If you are taking this medicine on a regular schedule and miss a dose, take it as soon as you can. If it is almost time for your next dose, take only that dose. Do not take double or extra doses.  What may interact with this medicine?  Do not take this medicine with any of the following medications:  -cidofovir  -ketorolac  -probenecid  This medicine may also interact with the following medications:  -alcohol  -alendronate  -bismuth subsalicylate  -flavocoxid  -herbal supplements like feverfew, garlic, flaquito, ginkgo biloba, horse chestnut  -medicines for diabetes or glaucoma like acetazolamide, methazolamide  -medicines for gout  -medicines that treat or prevent blood clots like enoxaparin, heparin, ticlopidine, warfarin  -other aspirin and aspirin-like medicines  -NSAIDs, medicines for pain and inflammation, like ibuprofen or naproxen  -pemetrexed  -sulfinpyrazone  -varicella live vaccine  This list may not describe all possible interactions. Give your health care provider a list of all the medicines, herbs, non-prescription drugs, or dietary supplements you use. Also tell them if you smoke, drink alcohol, or use illegal drugs. Some items may interact with your medicine.  What should I watch for while using this medicine?  If you are treating yourself for pain, tell your doctor or health care professional if the pain lasts more than 10 days, if it gets worse,  or if there is a new or different kind of pain. Tell your doctor if you see redness or swelling. Also, check with your doctor if you have a fever that lasts for more than 3 days. Only take this medicine to prevent heart attacks or blood clotting if prescribed by your doctor or health care professional.  Do not take aspirin or aspirin-like medicines with this medicine. Too much aspirin can be dangerous. Always read the labels carefully.  This medicine can irritate your stomach or cause bleeding problems. Do not smoke cigarettes or drink alcohol while taking this medicine. Do not lie down for 30 minutes after taking this medicine to prevent irritation to your throat.  If you are scheduled for any medical or dental procedure, tell your healthcare provider that you are taking this medicine. You may need to stop taking this medicine before the procedure.  This medicine may be used to treat migraines. If you take migraine medicines for 10 or more days a month, your migraines may get worse. Keep a diary of headache days and medicine use. Contact your healthcare professional if your migraine attacks occur more frequently.  What side effects may I notice from receiving this medicine?  Side effects that you should report to your doctor or health care professional as soon as possible:  -allergic reactions like skin rash, itching or hives, swelling of the face, lips, or tongue  -breathing problems  -changes in hearing, ringing in the ears  -confusion  -general ill feeling or flu-like symptoms  -pain on swallowing  -redness, blistering, peeling or loosening of the skin, including inside the mouth or nose  -signs and symptoms of bleeding such as bloody or black, tarry stools; red or dark-brown urine; spitting up blood or brown material that looks like coffee grounds; red spots on the skin; unusual bruising or bleeding from the eye, gums, or nose  -trouble passing urine or change in the amount of urine  -unusually weak or  tired  -yellowing of the eyes or skin  Side effects that usually do not require medical attention (report to your doctor or health care professional if they continue or are bothersome):  -diarrhea or constipation  -headache  -nausea, vomiting  -stomach gas, heartburn  This list may not describe all possible side effects. Call your doctor for medical advice about side effects. You may report side effects to FDA at 1-880-EMH-4930.  Where should I keep my medicine?  Keep out of the reach of children.  Store at room temperature between 15 and 30 degrees C (59 and 86 degrees F). Protect from heat and moisture. Do not use this medicine if it has a strong vinegar smell. Throw away any unused medicine after the expiration date.  NOTE: This sheet is a summary. It may not cover all possible information. If you have questions about this medicine, talk to your doctor, pharmacist, or health care provider.  © 2018 [employer]/Gold Standard (2014-08-19 11:30:31)      · Is patient discharged on Warfarin / Coumadin?   No     Depression / Suicide Risk    As you are discharged from this [facility] facility, it is important to learn how to keep safe from harming yourself.    Recognize the warning signs:  · Abrupt changes in personality, positive or negative- including increase in energy   · Giving away possessions  · Change in eating patterns- significant weight changes-  positive or negative  · Change in sleeping patterns- unable to sleep or sleeping all the time   · Unwillingness or inability to communicate  · Depression  · Unusual sadness, discouragement and loneliness  · Talk of wanting to die  · Neglect of personal appearance   · Rebelliousness- reckless behavior  · Withdrawal from people/activities they love  · Confusion- inability to concentrate     If you or a loved one observes any of these behaviors or has concerns about self-harm, here's what you can do:  · Talk about it- your feelings and reasons for harming  yourself  · Remove any means that you might use to hurt yourself (examples: pills, rope, extension cords, firearm)  · Get professional help from the community (Mental Health, Substance Abuse, psychological counseling)  · Do not be alone:Call your Safe Contact- someone whom you trust who will be there for you.  · Call your local CRISIS HOTLINE 712-7509 or 718-700-4632  · Call your local Children's Mobile Crisis Response Team [place] (020) 811-6836 or www.[employer]  · Call the toll free National Suicide Prevention Hotlines   · National Suicide Prevention Lifeline 844-348-OIAY (5772)  · National Hope Line Network 800-SUICIDE (722-9727)

## 2019-01-31 NOTE — DISCHARGE SUMMARY
Abdias Viera was admitted on 1/30/2019 for UNILATERAL PRIMARY OA AND PAIN RIGHT HIP  Primary osteoarthritis of right hip  Primary osteoarthritis of right hip  Patient was diagnosed with severe degenerative arthritis in the right hip and underwent a right total hip arthroplasty by Dr. Janet Davis on the date of admission. Please see dictated operative note for further information.    Hospital course: standard    The patient has done well, with no complications.  Patient denies chest pain, calf pain or shortness of breath.   Pain is well-controlled at present.  Patient is ambulating well with the use of an assistive device, and progressing in physical therapy.   Patient is neurologically and vascularly intact with palpable pedal pulses bilaterally.   Narcotic dosages were chosen by taking into account the the patient's previous history of opoid use and to ensure proper pain control after surgery    Discharge date: 1-31-19    Patient is being discharged to home after am physical therapy.     Allergies:  Patient has no known allergies.       Medication List      START taking these medications      Instructions   acetaminophen 500 MG Tabs  Commonly known as:  TYLENOL   Take 1 Tab by mouth every 6 hours.  Dose:  500 mg     aspirin 81 MG EC tablet   Take 1 Tab by mouth 2 Times a Day.  Dose:  81 mg     docusate sodium 100 MG Caps   Take 100 mg by mouth 2 Times a Day.  Dose:  100 mg     oxyCODONE immediate-release 5 MG Tabs  Commonly known as:  ROXICODONE   Take 1 Tab by mouth every 8 hours as needed (for pain; can be alternated with Tramadol for pain control) for up to 14 days.  Dose:  5 mg     tramadol 50 MG Tabs  Commonly known as:  ULTRAM   Take 1-2 Tabs by mouth every 8 hours as needed (for pain; can be alternated with Oxycodone for pain control) for up to 14 days.  Dose:   mg        CONTINUE taking these medications      Instructions   folic acid 1 MG Tabs  Commonly known as:  FOLVITE   Take 1 mg by  mouth every day.  Dose:  1 mg     levothyroxine 125 MCG Tabs  Commonly known as:  SYNTHROID   Take 1 Tab by mouth Every morning on an empty stomach.  Dose:  125 mcg     lisinopril-hydrochlorothiazide 10-12.5 MG per tablet  Commonly known as:  PRINZIDE, ZESTORETIC   TAKE 1 TABLET BY MOUTH ONCE DAILY     meloxicam 7.5 MG Tabs  Commonly known as:  MOBIC   Take 7.5 mg by mouth every day.  Dose:  7.5 mg     PARoxetine 10 MG Tabs  Commonly known as:  PAXIL   Take 1 Tab by mouth every day.  Dose:  10 mg     Vitamin B Complex Tabs   Take  by mouth.     XELJANZ 5 MG Tabs  Generic drug:  Tofacitinib Citrate   Take 1 Tab by mouth 2 Times a Day.  Dose:  1 Tab        STOP taking these medications    sulfaSALAzine 500 MG Tabs  Commonly known as:  AZULFIDINE     vitamin D 2000 UNIT Tabs            Discharge Instructions:     Patient is instructed to ambulate and weight bear as tolerated with the use of an assistive device, and to continue physical therapy exercises given during this hospital stay. Strict posterior hip precautions are to be observed for patients who underwent a total hip replacement.   Patient is to ice and elevate the surgical leg regularly, with pillows under the ankle, nothing is to be placed under the knee.   Patient was given detailed wound care instructions, and will leave the silver dressing on until first post-op visit.   ASA twice daily for DVT prophylaxis.  Patient is to follow up with Dr. Davis's office in 1-2 weeks.

## 2019-01-31 NOTE — CARE PLAN
Problem: Safety  Goal: Will remain free from injury  Outcome: PROGRESSING AS EXPECTED    Intervention: Provide assistance with mobility   01/31/19 0214   OTHER   Assistance / Tolerance Standby Assist;Tolerates Well         Problem: Venous Thromboembolism (VTW)/Deep Vein Thrombosis (DVT) Prevention:  Goal: Patient will participate in Venous Thrombosis (VTE)/Deep Vein Thrombosis (DVT)Prevention Measures  Outcome: PROGRESSING AS EXPECTED   01/30/19 2100 01/30/19 2331   Mechanical/VTE Prophylaxis   Mechanical Prophylaxis  --  SCDs, Sequential Compression Device   SCDs, Sequential Compression Device --  On   OTHER   Risk Assessment Score 2 --    VTE RISK Moderate --    Pharmacologic Prophylaxis Used (ASA) --

## 2019-01-31 NOTE — THERAPY
"Physical Therapy Evaluation completed.   Bed Mobility:     Transfers: Sit to Stand: Stand by Assist  Gait: Level Of Assist: Stand by Assist with Front-Wheel Walker  X 150 feet     Plan of Care: Patient with no further skilled PT needs in the acute care setting at this time  Discharge Recommendations: Equipment: No Equipment Needed. Post-acute therapy Discharge to home with outpatient or home health for additional skilled therapy services.  53 year old female S/P R THR post Pt lives at home with partner and is active.Pt is safe with transfers,ambulation and stairs,she understands HEP and precautions,she has no equipment needs.Pt is safe and ready for home  See \"Rehab Therapy-Acute\" Patient Summary Report for complete documentation.     "

## 2019-01-31 NOTE — DISCHARGE PLANNING
Care Transition Team Assessment    Information Source  Orientation : Oriented x 4  Information Given By: Patient    Elopement Risk  Legal Hold: No  Ambulatory or Self Mobile in Wheelchair: Yes  Disoriented: No  Psychiatric Symptoms: None  History of Wandering: No  Elopement this Admit: No  Vocalizing Wanting to Leave: No  Displays Behaviors, Body Language Wanting to Leave: No-Not at Risk for Elopement  Elopement Risk: Not at Risk for Elopement    Interdisciplinary Discharge Planning  Does Admitting Nurse Feel This Could be a Complex Discharge?: No  Patient or legal guardian wants to designate a caregiver (see row info): No    Discharge Preparedness  What is your plan after discharge?: Home with help       Vision / Hearing Impairment  Vision Impairment : No  Right Eye Vision: Impaired, Wears Glasses  Left Eye Vision: Impaired, Wears Glasses  Hearing Impairment : No    Values / Beliefs / Concerns  Values / Beliefs Concerns : No  Spiritual Requests During Hospitalization: No         Domestic Abuse  Have you ever been the victim of abuse or violence?: No  Physical Abuse or Sexual Abuse: No  Verbal Abuse or Emotional Abuse: No  Possible Abuse Reported to:: Not Applicable              Anticipated Discharge Information  Anticipated discharge disposition: Home

## 2019-01-31 NOTE — PROGRESS NOTES
Received from pacu via bed.drowsy but awakens easily,vss.o2 at 3l nc in place.o2 sat.100 % .  Pain 6/10 but does fall asleep when not being disturbed.rt.hip drsg cdi.polar ice in place.scd's on iv infusing

## 2019-01-31 NOTE — FLOWSHEET NOTE
01/30/19 1644   Type of Assessment   Assessment Yes   Level Of Consciousness   Level of Consciousness Alert   Respiratory WDL   Respiratory (WDL) X   Chest Exam   Respiration 17   Pulse 66   Breath Sounds   RUL Breath Sounds Clear   RML Breath Sounds Clear   RLL Breath Sounds Diminished   BRANDIE Breath Sounds Clear   LLL Breath Sounds Diminished   Protocol Pathways   Protocol Pathways Yes   Incentive Spirometer Instruct   Actual (ml) 1750   O2 Protocol   O2 (LPM) 3   Pulse Oximetry 100 %   O2 Protocol   O2 Protocol Indications Room Air SpO2 Less Than 90%

## 2019-01-31 NOTE — PROGRESS NOTES
S:  s/p right ROSIE  Pain controlled  No N/V  No Chest Pain/SOB  Afebrile  Exam:    NAD A& O x3    RLE: +DF/PF/EHL SILT L4/L5/S1  LLE:  +DF/PF/EHL SILT L4/L5/S1    Plan:  Continue standard plan of care  Weight bearing: as tolerated with walker/cane and posterior hip precautions  DVT prophylaxis: SCD/Teds + ASA  Dispo: home today, needs study X-rays prior to discharge

## 2019-01-31 NOTE — THERAPY
Occupational Therapy Evaluation completed.   Functional Status:  52 yo female admit for ROSIE.  Pt was seated in chair, c/o pain in R hip.  Agreeable to OT. Pt req CGA and reacher for LB dressing, pants only.  Denied need for shoes right now, states she'll have family help her later.  Pt states she has been going to bathroom with nursing, denied need to demo technique.  Pt states she went to jt class and has a reacher.  She was initially very set on using her open jacuzzi tub (tall sides) for bathing because she has a shower hose that connects to the faucet she thought would be easier to use than the stand up shower that has a rain shower head.  Once OT reviewed technique to get into tall sided tub without breaking hip precautions and that it would be very challenging, pt was more convinced of the shower stall as a better option.  Pt states she has family and friends avail to assist as needed.  Educated pt on role of OT and Posterior Total Hip Precautions with ADL's. Reviewed application of precautions and use of Adaptive Equipment for dressing, bathing, toileting, grooming, kitchen activities and general functional mobility in the home. Reviewed the use of reacher, long handled shoe horn and long handled sponge, as well as shower chair and raised toilet seat to assist with ADL's.  Reviewed stall vs tub shower transfers. Provided pt with ADL equipment handout and suggestions on where to obtain needed items.  Pt will obtain necessary ADL equipment. Stressed importance of following posterior hip precautions with all IADL's, and having help to bring all commonly used items to counter reach level so that no bending is required through the course of a normal day.  Educated on covering incision with plastic wrap and skin tape for protection when showering, and around the clock pain management schedule to prevent pain crisis.  Pt verbalized understanding of all education provided. No further OT needs at this.   Plan of Care:  "Patient with no further skilled OT needs in the acute care setting at this time  Discharge Recommendations:  Equipment: No Equipment Needed. Post-acute therapy Discharge to home with outpatient or home health for additional skilled therapy services.    See \"Rehab Therapy-Acute\" Patient Summary Report for complete documentation.    "

## 2019-02-01 ENCOUNTER — PATIENT OUTREACH (OUTPATIENT)
Dept: HEALTH INFORMATION MANAGEMENT | Facility: OTHER | Age: 54
End: 2019-02-01

## 2019-02-01 NOTE — PROGRESS NOTES
Went over all d/c instructions and meds with pt prior to d/c.all questions answered.  Medicated prior to d/c.  Escorted out via w/c with cna assist

## 2019-02-02 NOTE — PROGRESS NOTES
Outbound call to Abdias for post discharge medication review. Left VM with my contact information and request for return call. Medications reviewed. Interaction noted between meloxicam and paroxetine for increased risk of GI bleeding. Will discuss with patient upon return call.     Renee Johnston, DamonD

## 2019-03-01 ENCOUNTER — PATIENT OUTREACH (OUTPATIENT)
Dept: HEALTH INFORMATION MANAGEMENT | Facility: OTHER | Age: 54
End: 2019-03-01

## 2019-04-24 ENCOUNTER — TELEPHONE (OUTPATIENT)
Dept: MEDICAL GROUP | Facility: PHYSICIAN GROUP | Age: 54
End: 2019-04-24

## 2019-04-24 NOTE — TELEPHONE ENCOUNTER
1. Caller Name: Abdias                                           Call Back Number: [phone] (home)         Patient approves a detailed voicemail message: N\A    Called and talked with pt about scheduling her AWV. Per pt she had hip surgery in January and has another one coming up in June along with other doctors appointments. Pt declines to schedule AWV at this time.

## 2019-04-29 ENCOUNTER — TELEPHONE (OUTPATIENT)
Dept: MEDICAL GROUP | Facility: PHYSICIAN GROUP | Age: 54
End: 2019-04-29

## 2019-04-29 NOTE — TELEPHONE ENCOUNTER
1. Caller Name: Abdias                                           Call Back Number: [phone] (home)         Patient approves a detailed voicemail message: N\A    LVM for pt to call and schedule AWV.

## 2019-05-01 ENCOUNTER — PATIENT OUTREACH (OUTPATIENT)
Dept: HEALTH INFORMATION MANAGEMENT | Facility: OTHER | Age: 54
End: 2019-05-01

## 2019-05-01 NOTE — PROGRESS NOTES
Outcome: Left Message    Please transfer to Patient Outreach Team at [phone] when patient returns call.    [facility] Verified: yes    Attempt # 1

## 2019-05-01 NOTE — PROGRESS NOTES
1. [facility] Verified: yes    2. Verify PCP: yes    3. Review and add  to Care Team: yes    4. WebIZ Checked & Epic Updated:web iz locked unable to verify  5. Reviewed/Updated the following with patient:       •   Communication Preference Obtained? YES  • MyChart Activation: already active       •   E-Mail Address Obtained? YES       •   Appointment Day and Time Preferences? NO       •   Preferred Pharmacy? YES       •   Preferred Lab? YES    6. Care Gap Scheduling (Attempt to Schedule EACH Overdue Care Gap!)    Declined care gaps at this time

## 2019-05-13 RX ORDER — LISINOPRIL AND HYDROCHLOROTHIAZIDE 12.5; 1 MG/1; MG/1
TABLET ORAL
Qty: 90 TAB | Refills: 0 | Status: SHIPPED | OUTPATIENT
Start: 2019-05-13 | End: 2019-08-12 | Stop reason: SDUPTHER

## 2019-05-16 ENCOUNTER — HOSPITAL ENCOUNTER (OUTPATIENT)
Dept: LAB | Facility: MEDICAL CENTER | Age: 54
End: 2019-05-16
Attending: SPECIALIST
Payer: MEDICARE

## 2019-05-16 LAB
25(OH)D3 SERPL-MCNC: 24 NG/ML (ref 30–100)
ALBUMIN SERPL BCP-MCNC: 4.4 G/DL (ref 3.2–4.9)
ALP SERPL-CCNC: 89 U/L (ref 30–99)
ALT SERPL-CCNC: 21 U/L (ref 2–50)
AST SERPL-CCNC: 20 U/L (ref 12–45)
BASOPHILS # BLD AUTO: 0.7 % (ref 0–1.8)
BASOPHILS # BLD: 0.04 K/UL (ref 0–0.12)
BILIRUB CONJ SERPL-MCNC: 0.1 MG/DL (ref 0.1–0.5)
BILIRUB INDIRECT SERPL-MCNC: 0.4 MG/DL (ref 0–1)
BILIRUB SERPL-MCNC: 0.5 MG/DL (ref 0.1–1.5)
EOSINOPHIL # BLD AUTO: 0.19 K/UL (ref 0–0.51)
EOSINOPHIL NFR BLD: 3.2 % (ref 0–6.9)
ERYTHROCYTE [DISTWIDTH] IN BLOOD BY AUTOMATED COUNT: 49.5 FL (ref 35.9–50)
HCT VFR BLD AUTO: 43.4 % (ref 37–47)
HGB BLD-MCNC: 13.7 G/DL (ref 12–16)
IMM GRANULOCYTES # BLD AUTO: 0.02 K/UL (ref 0–0.11)
IMM GRANULOCYTES NFR BLD AUTO: 0.3 % (ref 0–0.9)
LYMPHOCYTES # BLD AUTO: 1.03 K/UL (ref 1–4.8)
LYMPHOCYTES NFR BLD: 17.4 % (ref 22–41)
MCH RBC QN AUTO: 29.3 PG (ref 27–33)
MCHC RBC AUTO-ENTMCNC: 31.6 G/DL (ref 33.6–35)
MCV RBC AUTO: 92.9 FL (ref 81.4–97.8)
MONOCYTES # BLD AUTO: 0.59 K/UL (ref 0–0.85)
MONOCYTES NFR BLD AUTO: 10 % (ref 0–13.4)
NEUTROPHILS # BLD AUTO: 4.05 K/UL (ref 2–7.15)
NEUTROPHILS NFR BLD: 68.4 % (ref 44–72)
NRBC # BLD AUTO: 0 K/UL
NRBC BLD-RTO: 0 /100 WBC
PLATELET # BLD AUTO: 259 K/UL (ref 164–446)
PMV BLD AUTO: 11.2 FL (ref 9–12.9)
PROT SERPL-MCNC: 7.1 G/DL (ref 6–8.2)
RBC # BLD AUTO: 4.67 M/UL (ref 4.2–5.4)
WBC # BLD AUTO: 5.9 K/UL (ref 4.8–10.8)

## 2019-05-16 PROCEDURE — 82306 VITAMIN D 25 HYDROXY: CPT

## 2019-05-16 PROCEDURE — 85025 COMPLETE CBC W/AUTO DIFF WBC: CPT

## 2019-05-16 PROCEDURE — 80076 HEPATIC FUNCTION PANEL: CPT

## 2019-05-16 PROCEDURE — 36415 COLL VENOUS BLD VENIPUNCTURE: CPT

## 2019-06-06 ENCOUNTER — HOSPITAL ENCOUNTER (OUTPATIENT)
Dept: RADIOLOGY | Facility: MEDICAL CENTER | Age: 54
DRG: 470 | End: 2019-06-06
Attending: ORTHOPAEDIC SURGERY | Admitting: ORTHOPAEDIC SURGERY
Payer: MEDICARE

## 2019-06-06 DIAGNOSIS — Z01.818 PREOP EXAMINATION: ICD-10-CM

## 2019-06-06 DIAGNOSIS — Z01.812 PRE-OPERATIVE LABORATORY EXAMINATION: ICD-10-CM

## 2019-06-06 LAB
ANION GAP SERPL CALC-SCNC: 10 MMOL/L (ref 0–11.9)
BUN SERPL-MCNC: 15 MG/DL (ref 8–22)
CALCIUM SERPL-MCNC: 10.1 MG/DL (ref 8.5–10.5)
CHLORIDE SERPL-SCNC: 103 MMOL/L (ref 96–112)
CO2 SERPL-SCNC: 26 MMOL/L (ref 20–33)
CREAT SERPL-MCNC: 0.8 MG/DL (ref 0.5–1.4)
ERYTHROCYTE [DISTWIDTH] IN BLOOD BY AUTOMATED COUNT: 46.3 FL (ref 35.9–50)
EST. AVERAGE GLUCOSE BLD GHB EST-MCNC: 105 MG/DL
GLUCOSE SERPL-MCNC: 108 MG/DL (ref 65–99)
HBA1C MFR BLD: 5.3 % (ref 0–5.6)
HCT VFR BLD AUTO: 40.4 % (ref 37–47)
HGB BLD-MCNC: 13.5 G/DL (ref 12–16)
HIV 1+2 AB+HIV1 P24 AG SERPL QL IA: NON REACTIVE
MCH RBC QN AUTO: 30.7 PG (ref 27–33)
MCHC RBC AUTO-ENTMCNC: 33.4 G/DL (ref 33.6–35)
MCV RBC AUTO: 91.8 FL (ref 81.4–97.8)
PLATELET # BLD AUTO: 248 K/UL (ref 164–446)
PMV BLD AUTO: 10.9 FL (ref 9–12.9)
POTASSIUM SERPL-SCNC: 3.6 MMOL/L (ref 3.6–5.5)
RBC # BLD AUTO: 4.4 M/UL (ref 4.2–5.4)
SCCMEC + MECA PNL NOSE NAA+PROBE: NEGATIVE
SCCMEC + MECA PNL NOSE NAA+PROBE: NEGATIVE
SODIUM SERPL-SCNC: 139 MMOL/L (ref 135–145)
WBC # BLD AUTO: 5.5 K/UL (ref 4.8–10.8)

## 2019-06-06 PROCEDURE — 87389 HIV-1 AG W/HIV-1&-2 AB AG IA: CPT

## 2019-06-06 PROCEDURE — 87641 MR-STAPH DNA AMP PROBE: CPT

## 2019-06-06 PROCEDURE — 85730 THROMBOPLASTIN TIME PARTIAL: CPT

## 2019-06-06 PROCEDURE — 36415 COLL VENOUS BLD VENIPUNCTURE: CPT

## 2019-06-06 PROCEDURE — 85027 COMPLETE CBC AUTOMATED: CPT

## 2019-06-06 PROCEDURE — 72050 X-RAY EXAM NECK SPINE 4/5VWS: CPT

## 2019-06-06 PROCEDURE — 87640 STAPH A DNA AMP PROBE: CPT | Mod: XU

## 2019-06-06 PROCEDURE — 83036 HEMOGLOBIN GLYCOSYLATED A1C: CPT

## 2019-06-06 PROCEDURE — 85610 PROTHROMBIN TIME: CPT

## 2019-06-06 PROCEDURE — 80048 BASIC METABOLIC PNL TOTAL CA: CPT

## 2019-06-06 RX ORDER — SULFADIAZINE 500 MG/1
1000 TABLET ORAL 2 TIMES DAILY
COMMUNITY
End: 2022-08-30

## 2019-06-06 NOTE — OR NURSING
PreAdmit Appointment: Patient given Preparing for your procedure handout. Patient instructed to continue regularly prescribed medications through day before surgery. Instructed to take the following medications the day of surgery with a sip of water per Anesthesia protocol: Levothyroxine. CHG scrub kit given to patient along with instructions.

## 2019-06-06 NOTE — DISCHARGE PLANNING
6/6/19Met with patient briefly to review all information.  Confirmed with patient there were no changes in living conditions or post-op plan from previous.  Patient states no questions at this time.      Procedure: Procedure(s):  HIP ARTHROPLASTY TOTAL  Procedure Date: 1/30/2019  Insurance:  Payor: [employer] / Plan: [employer] / Product Type: *No Product type* /   Equipment currently available at home? bedside commode, front-wheel walker, shower chair and sock assist, grabber  Steps into the home? threshold  Steps within the home? 3 story will remain downstairs  Toilet height? Standard  Type of shower? tub-shower with hose  Who will be with you during your recovery? Spouse--Rachid  Is Outpatient Physical Therapy set up after surgery? Yes 2/6/19  Did you take the Total Joint Class and where? Yes, at [facility]     Plan: Met with patient during preadmission appointment.  Reviewed Equipment Resource list and provided a copy to the patient with Care Chest recommendation.  Provided and reviewed Home Safety Checklist.  Quiet Hours information given and reviewed.  There are no identified discharge needs at this time.  Anticipate discharge home with family.  No Dme needed at this time.

## 2019-06-07 LAB
APTT PPP: 31.1 SEC (ref 24.7–36)
INR PPP: 1.05 (ref 0.87–1.13)
PROTHROMBIN TIME: 14 SEC (ref 12–14.6)

## 2019-06-14 ENCOUNTER — APPOINTMENT (OUTPATIENT)
Dept: ADMISSIONS | Facility: MEDICAL CENTER | Age: 54
DRG: 470 | End: 2019-06-14
Payer: MEDICARE

## 2019-06-26 ENCOUNTER — ANESTHESIA EVENT (OUTPATIENT)
Dept: SURGERY | Facility: MEDICAL CENTER | Age: 54
DRG: 470 | End: 2019-06-26
Payer: MEDICARE

## 2019-06-26 ENCOUNTER — HOSPITAL ENCOUNTER (INPATIENT)
Facility: MEDICAL CENTER | Age: 54
LOS: 1 days | DRG: 470 | End: 2019-06-27
Attending: ORTHOPAEDIC SURGERY | Admitting: ORTHOPAEDIC SURGERY
Payer: MEDICARE

## 2019-06-26 ENCOUNTER — ANESTHESIA (OUTPATIENT)
Dept: SURGERY | Facility: MEDICAL CENTER | Age: 54
DRG: 470 | End: 2019-06-26
Payer: MEDICARE

## 2019-06-26 ENCOUNTER — APPOINTMENT (OUTPATIENT)
Dept: RADIOLOGY | Facility: MEDICAL CENTER | Age: 54
DRG: 470 | End: 2019-06-26
Attending: ORTHOPAEDIC SURGERY
Payer: MEDICARE

## 2019-06-26 DIAGNOSIS — G89.18 POST-OP PAIN: ICD-10-CM

## 2019-06-26 DIAGNOSIS — M16.12 PRIMARY OSTEOARTHRITIS OF LEFT HIP: ICD-10-CM

## 2019-06-26 PROCEDURE — 700111 HCHG RX REV CODE 636 W/ 250 OVERRIDE (IP): Performed by: ANESTHESIOLOGY

## 2019-06-26 PROCEDURE — 160048 HCHG OR STATISTICAL LEVEL 1-5: Performed by: ORTHOPAEDIC SURGERY

## 2019-06-26 PROCEDURE — 501838 HCHG SUTURE GENERAL: Performed by: ORTHOPAEDIC SURGERY

## 2019-06-26 PROCEDURE — 700101 HCHG RX REV CODE 250: Performed by: ANESTHESIOLOGY

## 2019-06-26 PROCEDURE — 160036 HCHG PACU - EA ADDL 30 MINS PHASE I: Performed by: ORTHOPAEDIC SURGERY

## 2019-06-26 PROCEDURE — 502578 HCHG PACK, TOTAL HIP: Performed by: ORTHOPAEDIC SURGERY

## 2019-06-26 PROCEDURE — 700111 HCHG RX REV CODE 636 W/ 250 OVERRIDE (IP): Performed by: ORTHOPAEDIC SURGERY

## 2019-06-26 PROCEDURE — 502240 HCHG MISC OR SUPPLY RC 0272: Performed by: ORTHOPAEDIC SURGERY

## 2019-06-26 PROCEDURE — 770006 HCHG ROOM/CARE - MED/SURG/GYN SEMI*

## 2019-06-26 PROCEDURE — 700105 HCHG RX REV CODE 258

## 2019-06-26 PROCEDURE — 700101 HCHG RX REV CODE 250: Performed by: PHYSICIAN ASSISTANT

## 2019-06-26 PROCEDURE — 700105 HCHG RX REV CODE 258: Performed by: ORTHOPAEDIC SURGERY

## 2019-06-26 PROCEDURE — 502000 HCHG MISC OR IMPLANTS RC 0278: Performed by: ORTHOPAEDIC SURGERY

## 2019-06-26 PROCEDURE — 700102 HCHG RX REV CODE 250 W/ 637 OVERRIDE(OP): Performed by: ANESTHESIOLOGY

## 2019-06-26 PROCEDURE — 94760 N-INVAS EAR/PLS OXIMETRY 1: CPT

## 2019-06-26 PROCEDURE — A9270 NON-COVERED ITEM OR SERVICE: HCPCS | Performed by: ORTHOPAEDIC SURGERY

## 2019-06-26 PROCEDURE — 700105 HCHG RX REV CODE 258: Performed by: ANESTHESIOLOGY

## 2019-06-26 PROCEDURE — 72170 X-RAY EXAM OF PELVIS: CPT

## 2019-06-26 PROCEDURE — 160035 HCHG PACU - 1ST 60 MINS PHASE I: Performed by: ORTHOPAEDIC SURGERY

## 2019-06-26 PROCEDURE — 501411 HCHG SPONGE, BABY LAP W/O RINGS: Performed by: ORTHOPAEDIC SURGERY

## 2019-06-26 PROCEDURE — 160009 HCHG ANES TIME/MIN: Performed by: ORTHOPAEDIC SURGERY

## 2019-06-26 PROCEDURE — 700102 HCHG RX REV CODE 250 W/ 637 OVERRIDE(OP): Performed by: PHYSICIAN ASSISTANT

## 2019-06-26 PROCEDURE — 160042 HCHG SURGERY MINUTES - EA ADDL 1 MIN LEVEL 5: Performed by: ORTHOPAEDIC SURGERY

## 2019-06-26 PROCEDURE — A9270 NON-COVERED ITEM OR SERVICE: HCPCS | Performed by: ANESTHESIOLOGY

## 2019-06-26 PROCEDURE — 700111 HCHG RX REV CODE 636 W/ 250 OVERRIDE (IP): Performed by: PHYSICIAN ASSISTANT

## 2019-06-26 PROCEDURE — 700101 HCHG RX REV CODE 250: Performed by: ORTHOPAEDIC SURGERY

## 2019-06-26 PROCEDURE — 700105 HCHG RX REV CODE 258: Performed by: PHYSICIAN ASSISTANT

## 2019-06-26 PROCEDURE — 700101 HCHG RX REV CODE 250

## 2019-06-26 PROCEDURE — 0SRB03Z REPLACEMENT OF LEFT HIP JOINT WITH CERAMIC SYNTHETIC SUBSTITUTE, OPEN APPROACH: ICD-10-PCS | Performed by: ORTHOPAEDIC SURGERY

## 2019-06-26 PROCEDURE — A9270 NON-COVERED ITEM OR SERVICE: HCPCS | Performed by: PHYSICIAN ASSISTANT

## 2019-06-26 PROCEDURE — 700102 HCHG RX REV CODE 250 W/ 637 OVERRIDE(OP): Performed by: ORTHOPAEDIC SURGERY

## 2019-06-26 PROCEDURE — 160031 HCHG SURGERY MINUTES - 1ST 30 MINS LEVEL 5: Performed by: ORTHOPAEDIC SURGERY

## 2019-06-26 PROCEDURE — 160002 HCHG RECOVERY MINUTES (STAT): Performed by: ORTHOPAEDIC SURGERY

## 2019-06-26 DEVICE — IMPLANT REF SPHER HEAD SCREW 30MM (1EA): Type: IMPLANTABLE DEVICE | Site: HIP | Status: FUNCTIONAL

## 2019-06-26 DEVICE — IMPLANTABLE DEVICE: Type: IMPLANTABLE DEVICE | Site: HIP | Status: FUNCTIONAL

## 2019-06-26 DEVICE — STEM POLAR CEMENTLESS STANDARD TI/HA 2: Type: IMPLANTABLE DEVICE | Site: HIP | Status: FUNCTIONAL

## 2019-06-26 DEVICE — IMPLANT R3 3 HOLE ACET SHELL 48MM (1EA): Type: IMPLANTABLE DEVICE | Site: HIP | Status: FUNCTIONAL

## 2019-06-26 RX ORDER — DIPHENHYDRAMINE HYDROCHLORIDE 50 MG/ML
25 INJECTION INTRAMUSCULAR; INTRAVENOUS EVERY 6 HOURS PRN
Status: DISCONTINUED | OUTPATIENT
Start: 2019-06-26 | End: 2019-06-27 | Stop reason: HOSPADM

## 2019-06-26 RX ORDER — SODIUM CHLORIDE, SODIUM LACTATE, POTASSIUM CHLORIDE, CALCIUM CHLORIDE 600; 310; 30; 20 MG/100ML; MG/100ML; MG/100ML; MG/100ML
1000 INJECTION, SOLUTION INTRAVENOUS CONTINUOUS
Status: DISCONTINUED | OUTPATIENT
Start: 2019-06-26 | End: 2019-06-27 | Stop reason: HOSPADM

## 2019-06-26 RX ORDER — CEFAZOLIN SODIUM 1 G/3ML
INJECTION, POWDER, FOR SOLUTION INTRAMUSCULAR; INTRAVENOUS PRN
Status: DISCONTINUED | OUTPATIENT
Start: 2019-06-26 | End: 2019-06-26 | Stop reason: SURG

## 2019-06-26 RX ORDER — SCOLOPAMINE TRANSDERMAL SYSTEM 1 MG/1
1 PATCH, EXTENDED RELEASE TRANSDERMAL
Status: DISCONTINUED | OUTPATIENT
Start: 2019-06-26 | End: 2019-06-27 | Stop reason: HOSPADM

## 2019-06-26 RX ORDER — ACETAMINOPHEN 500 MG
500 TABLET ORAL EVERY 6 HOURS
Status: DISCONTINUED | OUTPATIENT
Start: 2019-06-26 | End: 2019-06-27 | Stop reason: HOSPADM

## 2019-06-26 RX ORDER — LISINOPRIL AND HYDROCHLOROTHIAZIDE 12.5; 1 MG/1; MG/1
1 TABLET ORAL DAILY
Status: DISCONTINUED | OUTPATIENT
Start: 2019-06-26 | End: 2019-06-26

## 2019-06-26 RX ORDER — FOLIC ACID 1 MG/1
1 TABLET ORAL DAILY
Status: DISCONTINUED | OUTPATIENT
Start: 2019-06-26 | End: 2019-06-27 | Stop reason: HOSPADM

## 2019-06-26 RX ORDER — LISINOPRIL 5 MG/1
10 TABLET ORAL
Status: DISCONTINUED | OUTPATIENT
Start: 2019-06-26 | End: 2019-06-27 | Stop reason: HOSPADM

## 2019-06-26 RX ORDER — TRANEXAMIC ACID 100 MG/ML
INJECTION, SOLUTION INTRAVENOUS PRN
Status: DISCONTINUED | OUTPATIENT
Start: 2019-06-26 | End: 2019-06-26 | Stop reason: SURG

## 2019-06-26 RX ORDER — ENEMA 19; 7 G/133ML; G/133ML
1 ENEMA RECTAL
Status: DISCONTINUED | OUTPATIENT
Start: 2019-06-26 | End: 2019-06-27 | Stop reason: HOSPADM

## 2019-06-26 RX ORDER — TRAMADOL HYDROCHLORIDE 50 MG/1
50 TABLET ORAL EVERY 4 HOURS PRN
Status: DISCONTINUED | OUTPATIENT
Start: 2019-06-26 | End: 2019-06-27 | Stop reason: HOSPADM

## 2019-06-26 RX ORDER — TAMSULOSIN HYDROCHLORIDE 0.4 MG/1
0.4 CAPSULE ORAL
Status: DISCONTINUED | OUTPATIENT
Start: 2019-06-26 | End: 2019-06-27 | Stop reason: HOSPADM

## 2019-06-26 RX ORDER — ONDANSETRON 2 MG/ML
INJECTION INTRAMUSCULAR; INTRAVENOUS PRN
Status: DISCONTINUED | OUTPATIENT
Start: 2019-06-26 | End: 2019-06-26 | Stop reason: SURG

## 2019-06-26 RX ORDER — HYDROMORPHONE HYDROCHLORIDE 1 MG/ML
0.5 INJECTION, SOLUTION INTRAMUSCULAR; INTRAVENOUS; SUBCUTANEOUS
Status: DISCONTINUED | OUTPATIENT
Start: 2019-06-26 | End: 2019-06-27 | Stop reason: HOSPADM

## 2019-06-26 RX ORDER — CELECOXIB 200 MG/1
200 CAPSULE ORAL ONCE
Status: COMPLETED | OUTPATIENT
Start: 2019-06-26 | End: 2019-06-26

## 2019-06-26 RX ORDER — LEVOTHYROXINE SODIUM 0.12 MG/1
125 TABLET ORAL
Status: DISCONTINUED | OUTPATIENT
Start: 2019-06-27 | End: 2019-06-27 | Stop reason: HOSPADM

## 2019-06-26 RX ORDER — HYDROCHLOROTHIAZIDE 12.5 MG/1
12.5 CAPSULE, GELATIN COATED ORAL
Status: DISCONTINUED | OUTPATIENT
Start: 2019-06-26 | End: 2019-06-27 | Stop reason: HOSPADM

## 2019-06-26 RX ORDER — AMOXICILLIN 250 MG
1 CAPSULE ORAL
Status: DISCONTINUED | OUTPATIENT
Start: 2019-06-26 | End: 2019-06-27 | Stop reason: HOSPADM

## 2019-06-26 RX ORDER — METOPROLOL TARTRATE 1 MG/ML
INJECTION, SOLUTION INTRAVENOUS PRN
Status: DISCONTINUED | OUTPATIENT
Start: 2019-06-26 | End: 2019-06-26 | Stop reason: SURG

## 2019-06-26 RX ORDER — METOPROLOL TARTRATE 1 MG/ML
1 INJECTION, SOLUTION INTRAVENOUS
Status: DISCONTINUED | OUTPATIENT
Start: 2019-06-26 | End: 2019-06-26 | Stop reason: HOSPADM

## 2019-06-26 RX ORDER — ONDANSETRON 4 MG/1
4 TABLET, ORALLY DISINTEGRATING ORAL EVERY 4 HOURS PRN
Status: DISCONTINUED | OUTPATIENT
Start: 2019-06-26 | End: 2019-06-27 | Stop reason: HOSPADM

## 2019-06-26 RX ORDER — POLYETHYLENE GLYCOL 3350 17 G/17G
1 POWDER, FOR SOLUTION ORAL 2 TIMES DAILY PRN
Status: DISCONTINUED | OUTPATIENT
Start: 2019-06-26 | End: 2019-06-27 | Stop reason: HOSPADM

## 2019-06-26 RX ORDER — HALOPERIDOL 5 MG/ML
1 INJECTION INTRAMUSCULAR EVERY 6 HOURS PRN
Status: DISCONTINUED | OUTPATIENT
Start: 2019-06-26 | End: 2019-06-27 | Stop reason: HOSPADM

## 2019-06-26 RX ORDER — CHLORPROMAZINE HYDROCHLORIDE 25 MG/1
25 TABLET, FILM COATED ORAL EVERY 6 HOURS PRN
Status: DISCONTINUED | OUTPATIENT
Start: 2019-06-26 | End: 2019-06-27 | Stop reason: HOSPADM

## 2019-06-26 RX ORDER — BISACODYL 10 MG
10 SUPPOSITORY, RECTAL RECTAL
Status: DISCONTINUED | OUTPATIENT
Start: 2019-06-26 | End: 2019-06-27 | Stop reason: HOSPADM

## 2019-06-26 RX ORDER — TRANEXAMIC ACID
POWDER (GRAM) MISCELLANEOUS
Status: DISCONTINUED | OUTPATIENT
Start: 2019-06-26 | End: 2019-06-26 | Stop reason: HOSPADM

## 2019-06-26 RX ORDER — DEXTROSE MONOHYDRATE, SODIUM CHLORIDE, AND POTASSIUM CHLORIDE 50; 1.49; 4.5 G/1000ML; G/1000ML; G/1000ML
INJECTION, SOLUTION INTRAVENOUS CONTINUOUS
Status: DISPENSED | OUTPATIENT
Start: 2019-06-26 | End: 2019-06-26

## 2019-06-26 RX ORDER — PAROXETINE 10 MG/1
10 TABLET, FILM COATED ORAL DAILY
Status: DISCONTINUED | OUTPATIENT
Start: 2019-06-27 | End: 2019-06-27 | Stop reason: HOSPADM

## 2019-06-26 RX ORDER — DIPHENHYDRAMINE HYDROCHLORIDE 50 MG/ML
12.5 INJECTION INTRAMUSCULAR; INTRAVENOUS
Status: DISCONTINUED | OUTPATIENT
Start: 2019-06-26 | End: 2019-06-26 | Stop reason: HOSPADM

## 2019-06-26 RX ORDER — KETOROLAC TROMETHAMINE 30 MG/ML
15 INJECTION, SOLUTION INTRAMUSCULAR; INTRAVENOUS EVERY 6 HOURS
Status: DISCONTINUED | OUTPATIENT
Start: 2019-06-26 | End: 2019-06-27 | Stop reason: HOSPADM

## 2019-06-26 RX ORDER — KETOROLAC TROMETHAMINE 30 MG/ML
INJECTION, SOLUTION INTRAMUSCULAR; INTRAVENOUS
Status: DISCONTINUED | OUTPATIENT
Start: 2019-06-26 | End: 2019-06-26 | Stop reason: HOSPADM

## 2019-06-26 RX ORDER — DEXAMETHASONE SODIUM PHOSPHATE 4 MG/ML
4 INJECTION, SOLUTION INTRA-ARTICULAR; INTRALESIONAL; INTRAMUSCULAR; INTRAVENOUS; SOFT TISSUE
Status: DISCONTINUED | OUTPATIENT
Start: 2019-06-26 | End: 2019-06-27 | Stop reason: HOSPADM

## 2019-06-26 RX ORDER — BUPIVACAINE HYDROCHLORIDE AND EPINEPHRINE 2.5; 5 MG/ML; UG/ML
INJECTION, SOLUTION INFILTRATION; PERINEURAL
Status: DISCONTINUED | OUTPATIENT
Start: 2019-06-26 | End: 2019-06-26 | Stop reason: HOSPADM

## 2019-06-26 RX ORDER — ONDANSETRON 2 MG/ML
4 INJECTION INTRAMUSCULAR; INTRAVENOUS EVERY 4 HOURS PRN
Status: DISCONTINUED | OUTPATIENT
Start: 2019-06-26 | End: 2019-06-27 | Stop reason: HOSPADM

## 2019-06-26 RX ORDER — SODIUM CHLORIDE, SODIUM LACTATE, POTASSIUM CHLORIDE, CALCIUM CHLORIDE 600; 310; 30; 20 MG/100ML; MG/100ML; MG/100ML; MG/100ML
INJECTION, SOLUTION INTRAVENOUS
Status: DISCONTINUED | OUTPATIENT
Start: 2019-06-26 | End: 2019-06-26 | Stop reason: SURG

## 2019-06-26 RX ORDER — DIPHENHYDRAMINE HCL 25 MG
25 TABLET ORAL NIGHTLY PRN
Status: DISCONTINUED | OUTPATIENT
Start: 2019-06-27 | End: 2019-06-27 | Stop reason: HOSPADM

## 2019-06-26 RX ORDER — CHLORPROMAZINE HYDROCHLORIDE 25 MG/ML
25 INJECTION INTRAMUSCULAR EVERY 6 HOURS PRN
Status: DISCONTINUED | OUTPATIENT
Start: 2019-06-26 | End: 2019-06-27 | Stop reason: HOSPADM

## 2019-06-26 RX ORDER — HYDRALAZINE HYDROCHLORIDE 20 MG/ML
5 INJECTION INTRAMUSCULAR; INTRAVENOUS
Status: DISCONTINUED | OUTPATIENT
Start: 2019-06-26 | End: 2019-06-26 | Stop reason: HOSPADM

## 2019-06-26 RX ORDER — ACETAMINOPHEN 500 MG
1000 TABLET ORAL ONCE
Status: COMPLETED | OUTPATIENT
Start: 2019-06-26 | End: 2019-06-26

## 2019-06-26 RX ORDER — GABAPENTIN 300 MG/1
300 CAPSULE ORAL ONCE
Status: COMPLETED | OUTPATIENT
Start: 2019-06-26 | End: 2019-06-26

## 2019-06-26 RX ORDER — DEXAMETHASONE SODIUM PHOSPHATE 4 MG/ML
INJECTION, SOLUTION INTRA-ARTICULAR; INTRALESIONAL; INTRAMUSCULAR; INTRAVENOUS; SOFT TISSUE PRN
Status: DISCONTINUED | OUTPATIENT
Start: 2019-06-26 | End: 2019-06-26 | Stop reason: SURG

## 2019-06-26 RX ORDER — DIPHENHYDRAMINE HCL 25 MG
25 TABLET ORAL EVERY 6 HOURS PRN
Status: DISCONTINUED | OUTPATIENT
Start: 2019-06-26 | End: 2019-06-27 | Stop reason: HOSPADM

## 2019-06-26 RX ORDER — AMOXICILLIN 250 MG
1 CAPSULE ORAL NIGHTLY
Status: DISCONTINUED | OUTPATIENT
Start: 2019-06-26 | End: 2019-06-27 | Stop reason: HOSPADM

## 2019-06-26 RX ORDER — DOCUSATE SODIUM 100 MG/1
100 CAPSULE, LIQUID FILLED ORAL 2 TIMES DAILY
Status: DISCONTINUED | OUTPATIENT
Start: 2019-06-26 | End: 2019-06-27 | Stop reason: HOSPADM

## 2019-06-26 RX ORDER — DEXAMETHASONE SODIUM PHOSPHATE 4 MG/ML
8 INJECTION, SOLUTION INTRA-ARTICULAR; INTRALESIONAL; INTRAMUSCULAR; INTRAVENOUS; SOFT TISSUE ONCE
Status: COMPLETED | OUTPATIENT
Start: 2019-06-27 | End: 2019-06-27

## 2019-06-26 RX ORDER — ONDANSETRON 2 MG/ML
4 INJECTION INTRAMUSCULAR; INTRAVENOUS
Status: DISCONTINUED | OUTPATIENT
Start: 2019-06-26 | End: 2019-06-26 | Stop reason: HOSPADM

## 2019-06-26 RX ORDER — HALOPERIDOL 5 MG/ML
1 INJECTION INTRAMUSCULAR
Status: DISCONTINUED | OUTPATIENT
Start: 2019-06-26 | End: 2019-06-26 | Stop reason: HOSPADM

## 2019-06-26 RX ORDER — CELECOXIB 200 MG/1
200 CAPSULE ORAL 2 TIMES DAILY
Status: DISCONTINUED | OUTPATIENT
Start: 2019-06-27 | End: 2019-06-27 | Stop reason: HOSPADM

## 2019-06-26 RX ADMIN — KETOROLAC TROMETHAMINE 15 MG: 30 INJECTION, SOLUTION INTRAMUSCULAR at 17:29

## 2019-06-26 RX ADMIN — TRAMADOL HYDROCHLORIDE 50 MG: 50 TABLET, FILM COATED ORAL at 23:45

## 2019-06-26 RX ADMIN — SODIUM CHLORIDE, POTASSIUM CHLORIDE, SODIUM LACTATE AND CALCIUM CHLORIDE: 600; 310; 30; 20 INJECTION, SOLUTION INTRAVENOUS at 12:33

## 2019-06-26 RX ADMIN — EPHEDRINE SULFATE 10 MG: 50 INJECTION INTRAMUSCULAR; INTRAVENOUS; SUBCUTANEOUS at 13:35

## 2019-06-26 RX ADMIN — FENTANYL CITRATE 50 MCG: 50 INJECTION INTRAMUSCULAR; INTRAVENOUS at 14:30

## 2019-06-26 RX ADMIN — ACETAMINOPHEN 500 MG: 500 TABLET, FILM COATED ORAL at 17:29

## 2019-06-26 RX ADMIN — CEFAZOLIN 2 G: 1 INJECTION, POWDER, FOR SOLUTION INTRAVENOUS at 12:33

## 2019-06-26 RX ADMIN — SODIUM CHLORIDE, POTASSIUM CHLORIDE, SODIUM LACTATE AND CALCIUM CHLORIDE: 600; 310; 30; 20 INJECTION, SOLUTION INTRAVENOUS at 13:35

## 2019-06-26 RX ADMIN — PROPOFOL 200 MG: 10 INJECTION, EMULSION INTRAVENOUS at 12:38

## 2019-06-26 RX ADMIN — TRAMADOL HYDROCHLORIDE 50 MG: 50 TABLET, FILM COATED ORAL at 16:35

## 2019-06-26 RX ADMIN — FENTANYL CITRATE 50 MCG: 50 INJECTION INTRAMUSCULAR; INTRAVENOUS at 15:09

## 2019-06-26 RX ADMIN — HYDROCODONE BITARTRATE AND ACETAMINOPHEN 30 ML: 7.5; 325 SOLUTION ORAL at 14:13

## 2019-06-26 RX ADMIN — ASPIRIN 81 MG: 81 TABLET, COATED ORAL at 20:48

## 2019-06-26 RX ADMIN — SODIUM CHLORIDE, POTASSIUM CHLORIDE, SODIUM LACTATE AND CALCIUM CHLORIDE 1000 ML: 600; 310; 30; 20 INJECTION, SOLUTION INTRAVENOUS at 10:09

## 2019-06-26 RX ADMIN — METOPROLOL TARTRATE 2.5 MG: 5 INJECTION INTRAVENOUS at 12:46

## 2019-06-26 RX ADMIN — POTASSIUM CHLORIDE, DEXTROSE MONOHYDRATE AND SODIUM CHLORIDE: 150; 5; 450 INJECTION, SOLUTION INTRAVENOUS at 17:13

## 2019-06-26 RX ADMIN — KETOROLAC TROMETHAMINE 15 MG: 30 INJECTION, SOLUTION INTRAMUSCULAR at 23:39

## 2019-06-26 RX ADMIN — SODIUM CHLORIDE 2 G: 9 INJECTION, SOLUTION INTRAVENOUS at 20:48

## 2019-06-26 RX ADMIN — FOLIC ACID 1 MG: 1 TABLET ORAL at 16:38

## 2019-06-26 RX ADMIN — FENTANYL CITRATE 150 MCG: 50 INJECTION, SOLUTION INTRAMUSCULAR; INTRAVENOUS at 12:55

## 2019-06-26 RX ADMIN — ACETAMINOPHEN 1000 MG: 500 TABLET, FILM COATED ORAL at 10:06

## 2019-06-26 RX ADMIN — TRANEXAMIC ACID 1000 MG: 100 INJECTION, SOLUTION INTRAVENOUS at 14:14

## 2019-06-26 RX ADMIN — FENTANYL CITRATE 50 MCG: 50 INJECTION INTRAMUSCULAR; INTRAVENOUS at 14:11

## 2019-06-26 RX ADMIN — TRANEXAMIC ACID 1000 MG: 100 INJECTION, SOLUTION INTRAVENOUS at 12:46

## 2019-06-26 RX ADMIN — SUGAMMADEX 200 MG: 100 INJECTION, SOLUTION INTRAVENOUS at 13:35

## 2019-06-26 RX ADMIN — SENNOSIDES AND DOCUSATE SODIUM 1 TABLET: 8.6; 5 TABLET ORAL at 20:48

## 2019-06-26 RX ADMIN — ACETAMINOPHEN 500 MG: 500 TABLET, FILM COATED ORAL at 23:39

## 2019-06-26 RX ADMIN — DEXAMETHASONE SODIUM PHOSPHATE 8 MG: 4 INJECTION, SOLUTION INTRAMUSCULAR; INTRAVENOUS at 13:12

## 2019-06-26 RX ADMIN — GABAPENTIN 300 MG: 300 CAPSULE ORAL at 10:06

## 2019-06-26 RX ADMIN — FENTANYL CITRATE 50 MCG: 50 INJECTION INTRAMUSCULAR; INTRAVENOUS at 14:14

## 2019-06-26 RX ADMIN — CELECOXIB 200 MG: 200 CAPSULE ORAL at 10:06

## 2019-06-26 RX ADMIN — FENTANYL CITRATE 100 MCG: 50 INJECTION, SOLUTION INTRAMUSCULAR; INTRAVENOUS at 12:38

## 2019-06-26 RX ADMIN — ROCURONIUM BROMIDE 50 MG: 10 INJECTION INTRAVENOUS at 12:38

## 2019-06-26 RX ADMIN — ONDANSETRON 4 MG: 2 INJECTION INTRAMUSCULAR; INTRAVENOUS at 13:12

## 2019-06-26 RX ADMIN — FENTANYL CITRATE 50 MCG: 50 INJECTION INTRAMUSCULAR; INTRAVENOUS at 14:22

## 2019-06-26 ASSESSMENT — PATIENT HEALTH QUESTIONNAIRE - PHQ9
1. LITTLE INTEREST OR PLEASURE IN DOING THINGS: NOT AT ALL
2. FEELING DOWN, DEPRESSED, IRRITABLE, OR HOPELESS: MORE THAN HALF THE DAYS
5. POOR APPETITE OR OVEREATING: NOT AT ALL
9. THOUGHTS THAT YOU WOULD BE BETTER OFF DEAD, OR OF HURTING YOURSELF: NOT AT ALL
4. FEELING TIRED OR HAVING LITTLE ENERGY: SEVERAL DAYS
3. TROUBLE FALLING OR STAYING ASLEEP OR SLEEPING TOO MUCH: SEVERAL DAYS
SUM OF ALL RESPONSES TO PHQ QUESTIONS 1-9: 11
6. FEELING BAD ABOUT YOURSELF - OR THAT YOU ARE A FAILURE OR HAVE LET YOURSELF OR YOUR FAMILY DOWN: NEARLY EVERY DAY
8. MOVING OR SPEAKING SO SLOWLY THAT OTHER PEOPLE COULD HAVE NOTICED. OR THE OPPOSITE, BEING SO FIGETY OR RESTLESS THAT YOU HAVE BEEN MOVING AROUND A LOT MORE THAN USUAL: SEVERAL DAYS
7. TROUBLE CONCENTRATING ON THINGS, SUCH AS READING THE NEWSPAPER OR WATCHING TELEVISION: NEARLY EVERY DAY
SUM OF ALL RESPONSES TO PHQ9 QUESTIONS 1 AND 2: 2

## 2019-06-26 ASSESSMENT — LIFESTYLE VARIABLES: EVER_SMOKED: YES

## 2019-06-26 ASSESSMENT — COGNITIVE AND FUNCTIONAL STATUS - GENERAL
MOBILITY SCORE: 16
CLIMB 3 TO 5 STEPS WITH RAILING: A LITTLE
DAILY ACTIVITIY SCORE: 22
TURNING FROM BACK TO SIDE WHILE IN FLAT BAD: A LITTLE
DRESSING REGULAR UPPER BODY CLOTHING: A LITTLE
STANDING UP FROM CHAIR USING ARMS: A LITTLE
MOVING TO AND FROM BED TO CHAIR: A LOT
SUGGESTED CMS G CODE MODIFIER DAILY ACTIVITY: CJ
SUGGESTED CMS G CODE MODIFIER MOBILITY: CK
DRESSING REGULAR LOWER BODY CLOTHING: A LITTLE
WALKING IN HOSPITAL ROOM: A LITTLE
MOVING FROM LYING ON BACK TO SITTING ON SIDE OF FLAT BED: A LOT

## 2019-06-26 ASSESSMENT — COPD QUESTIONNAIRES
HAVE YOU SMOKED AT LEAST 100 CIGARETTES IN YOUR ENTIRE LIFE: NO/DON'T KNOW
IN THE PAST 12 MONTHS DO YOU DO LESS THAN YOU USED TO BECAUSE OF YOUR BREATHING PROBLEMS: DISAGREE/UNSURE
DO YOU EVER COUGH UP ANY MUCUS OR PHLEGM?: NO/ONLY WITH OCCASIONAL COLDS OR INFECTIONS
DURING THE PAST 4 WEEKS HOW MUCH DID YOU FEEL SHORT OF BREATH: NONE/LITTLE OF THE TIME
COPD SCREENING SCORE: 1

## 2019-06-26 ASSESSMENT — PAIN SCALES - GENERAL: PAIN_LEVEL: 3

## 2019-06-26 NOTE — ANESTHESIA PROCEDURE NOTES
Airway  Date/Time: 6/26/2019 12:38 PM  Performed by: NICOLE LEONE  Authorized by: NICOLE LEONE     Location:  OR  Urgency:  Elective  Indications for Airway Management:  Anesthesia  Spontaneous Ventilation: absent    Sedation Level:  Deep  Preoxygenated: Yes    Patient Position:  Sniffing  Final Airway Type:  Endotracheal airway  Final Endotracheal Airway:  ETT  Cuffed: Yes    Technique Used for Successful ETT Placement:  Direct laryngoscopy  Insertion Site:  Oral  Blade Type:  Gabbie  Laryngoscope Blade/Videolaryngoscope Blade Size:  3  ETT Size (mm):  7.0  Leak Pressue (cm H2O):  21  Measured from:  Gums  Placement Verified by: auscultation and capnometry    Number of Attempts at Approach:  1

## 2019-06-26 NOTE — ANESTHESIA TIME REPORT
Anesthesia Start and Stop Event Times     Date Time Event    6/26/2019 1233 Anesthesia Start        Responsible Staff  06/26/19    Name Role Begin End    Jovany Santana M.D. Anesth 1233         Preop Diagnosis (Free Text):  Pre-op Diagnosis     UNILATERAL PRIMARY OSTEOARTHRITIS LEFT HIP WITH PAIN        Preop Diagnosis (Codes):  Diagnosis Information     Diagnosis Code(s):         Post op Diagnosis  Osteoarthritis      Premium Reason  Non-Premium    Comments:

## 2019-06-26 NOTE — OP REPORT
Date of Surgery:  6-26-19  Pre-operative Diagnosis:  left hip arthritis    Post-operative Diagnosis:  left hip arthritis    Procedure:  left hip replacement    Implants used:  A Smith Nephew total hip arthroplasty system with the following components  Acetabulum: 48 mm R3  Femur: Size 2 standard PolarStem  Bearing: Biolox Delta Ceramic  Head: 32 mm +4 Biolox Delta  Screws: 1    Surgeon:  Janet Davis MD    1st Assistant:   SID Deras    Anesthesiologist:   Max    EBL:  250 cc    Specimen:  none    Findings:  Severe arthritic changes, shallow, dysplastic acetabulum    Indications for procedure:  This patient is a 54 y.o. female with a long standing history of left hip arthritis. The patient had failed conservative therapy including anti-inflammatory medications, activity modifications, injections, physical therapy and assistive devices. The patient was indicated for a left total hip replacement. The patient expressed an understanding of the risks of pain, bleeding, infection, dislocation,  need for further surgery, damage to surrounding structures, neurovascular compromise, blood clots, leg-length discrepancy both apparent and actual, anesthetic complications, and serious medical consequences including but not limited to heart attack, stroke or even death. It was explained that the implants are man-made products and thus subject to possible failure.  The patient expressed an understanding and wished to proceed. She requested a similar hip as she has on the other side, and the ceramic on ceramic bearing was planned for her. She did express an understanding of the risks of breakage or implant squeaking. Specific risks based on the patient's medical history were addressed. A clearance was obtained by the medical physicians and the patient was taken to the operating room on the day of surgery for the above named procedure.     Description of procedure:  The patient was met in the pre-operative holding area. The  left hip was marked as the appropriate surgical site with indelible ink. They were taken to the operating room where the anesthesia department started a GETA for intraoperative and post-operative anesthesia and pain control. The patient was turned with the operative hip facing up. All bony prominences were padded appropriately. The left hip was prepped and draped in a standard sterile surgical fashion. A time out procedure was called to verify the side and site of surgery, the proposed surgical procedure, and the administration of pre-operative antibiotics. After successful completion of the time out procedure, our attention was turned to the left hip.     A straight incision was made centered on the greater trochanter. This was carried down through the subcutaneous tissue with the assistance of the Bovie electrocautery and the self-retaining retractors. The fascia was identified and cleared with a Lama elevator. This was incised in line with its fibers and the Charnley self-retaining retractor was placed. The hip was internally rotated and the external rotators were taken down. The piriformis was identified and tagged for later repair. The abductor were retracted anteriorly and protected. The posterior capsule was identified, cleared, and incised in an L-capsulotomy fashion. The corner of the L was tagged for later repair. The hip was able to be dislocated. We noted evidence of severe arthritic changes including cartilage loss and osteophytic overgrowth. The femoral neck was cleared and our neck cut was made in line with our previously templated images using a femoral neck cutting guide. The femoral head was removed, the femur was elevated out of the wound and we began our preparation of the femur, first with a box osteotome followed by a canal finder by hand then a lateralizing reamer on power. The femur was then broached to a size 2 at which point we noted excellent calcar fit and rotational stability. The broach  was removed and the femoral canal was packed with a baby lap sponge. The femur was retracted anteriorly and our attention was turned to the acetabulum.    The acetabulum was exposed and soft tissue was removed from its rim. The straight reamer was used to obtain proper medialization and then the offset reamer was used to ream up to a size 48 at which point we noted an appropriate bony bed for implantation. The acetabulum was irrigated.  A size 48 mm acetabular component was then opened in a sterile fashion and impacted into place in the proper amount of abduction and anteversion. 1 acetabular bone screw was placed for adjunctive fixation. The Biolox Delta Ceramic bearing surface was inserted and impacted into place. This was tested for proper seating and the retractors were removed and our attention was turned back to the femur.    The femur was elevated and exposed properly. The baby lap sponge was removed and the femoral canal was thoroughly irrigated. A trial stem was assembled, and we trialed both the +0 and the +4 heads.  With the +4 head in place we noted excellent restoration of leg length, offset and stability. The hip was stable past 90 degrees of flexion and past 60 degrees of internal rotation. It did not dislocate with extension and external rotation. Abductor tension was appropriate. At this point the hip was dislocated, the trial components were removed and the femur was irrigated. The real stem was opened in a sterile fashion on the back table and then impacted into the femur. Another trial reduction was made and again we noted excellent restoration of leg length, offset and stability. At this point the real head was opened and impacted onto the taper, which had been cleaned and dried thoroughly. Another reduction was made and again we noted excellent restoration of leg length, offset and stability.    Therefore a dilute betadine solution with 3 grams of tranexamic acid was instilled for 3 minutes  before being pulse-lavaged out. The posterior capsule was closed using the previously placed tag stitches. The piriformis was closed as a separate structure. The fascia was closed with #1 Vicryl in an interrupted figure of 8 fashion and then oversewn with a running #1 PDS. The deep subcutaneous tissue was closed with a running #1 PDS. The deep dermal layer was closed with 2-0 Vicryl in a buried interrupted fashion. The skin was closed with running 3-0 Monocryl and a sterile dressing was applied. The patient is currently in the operating room awaiting reversal of anesthesia. Appropriate DVT prophylaxis and perioperative antibiotics will be ordered. All sponge and instrument counts were correct prior to final wound closure.

## 2019-06-26 NOTE — OR NURSING
1400 pt to pacu s/p general anes for LTHA. lef thip dressing cd&i with prevena drain in place.   Left pedal pulse 1+.  Pt is moaning in pain. 7/10.  See MAR.  1410 handoff to Zuleika JULIO.

## 2019-06-26 NOTE — ANESTHESIA POSTPROCEDURE EVALUATION
Patient: Abdias Sr Lavac    Procedure Summary     Date:  06/26/19 Room / Location:   OR  / SURGERY [facility]    Anesthesia Start:  1233 Anesthesia Stop:      Procedure:  ARTHROPLASTY, HIP, TOTAL (Left Hip) Diagnosis:  (UNILATERAL PRIMARY OSTEOARTHRITIS LEFT HIP WITH PAIN)    Surgeon:  Janet Davis M.D. Responsible Provider:  Jovany Santana M.D.    Anesthesia Type:  general ASA Status:  2          Final Anesthesia Type: general  Last vitals  BP   Blood Pressure: (!) 170/76    Temp   36.5 °C (97.7 °F)    Pulse   Pulse: 72   Resp   16    SpO2   97 %      Anesthesia Post Evaluation    Patient location during evaluation: PACU  Patient participation: complete - patient participated  Level of consciousness: awake and alert  Pain score: 3    Airway patency: patent  Anesthetic complications: no  Cardiovascular status: hemodynamically stable  Respiratory status: acceptable  Hydration status: euvolemic    PONV: none           Nurse Pain Score: 2 (NPRS)

## 2019-06-26 NOTE — ANESTHESIA PREPROCEDURE EVALUATION
Relevant Problems   (+) Hypertension   (+) Hypothyroid   (+) Rheumatoid arthritis (HCC)       Physical Exam    Airway   Mallampati: III  TM distance: >3 FB  Neck ROM: full       Cardiovascular - normal exam  Rhythm: regular  Rate: normal  (-) murmur     Dental - normal exam         Pulmonary - normal exam  Breath sounds clear to auscultation     Abdominal    Neurological - normal exam                 Anesthesia Plan    ASA 2       Plan - general       Airway plan will be ETT        Induction: intravenous    Postoperative Plan: Postoperative administration of opioids is intended.    Pertinent diagnostic labs and testing reviewed    Informed Consent:    Anesthetic plan and risks discussed with patient.    Use of blood products discussed with: patient whom consented to blood products.

## 2019-06-26 NOTE — OR NURSING
1410 Report received from NORI Borrero. Pt awake, anxious, reporting pain. Pt denies nausea. Oral and IV pain medications administered. Dose of tranexamic acid given.     1430 X rays performed. IV pain medication in use.    1440 Report to NORI Borrero.     1510 Resumed care of pt. Pt recently medicated for pain. Pt sleeping, snoring.     1515 No changes.     1530 No changes. Pt agreeable to transfer to [facility].

## 2019-06-27 VITALS
SYSTOLIC BLOOD PRESSURE: 111 MMHG | TEMPERATURE: 98.5 F | HEART RATE: 61 BPM | HEIGHT: 64 IN | OXYGEN SATURATION: 93 % | WEIGHT: 188.05 LBS | RESPIRATION RATE: 18 BRPM | BODY MASS INDEX: 32.11 KG/M2 | DIASTOLIC BLOOD PRESSURE: 50 MMHG

## 2019-06-27 PROBLEM — M16.12 PRIMARY OSTEOARTHRITIS OF LEFT HIP: Status: ACTIVE | Noted: 2019-06-27

## 2019-06-27 LAB
HCT VFR BLD AUTO: 37.3 % (ref 37–47)
HGB BLD-MCNC: 12.1 G/DL (ref 12–16)

## 2019-06-27 PROCEDURE — A9270 NON-COVERED ITEM OR SERVICE: HCPCS | Performed by: PHYSICIAN ASSISTANT

## 2019-06-27 PROCEDURE — 36415 COLL VENOUS BLD VENIPUNCTURE: CPT

## 2019-06-27 PROCEDURE — 700102 HCHG RX REV CODE 250 W/ 637 OVERRIDE(OP): Performed by: PHYSICIAN ASSISTANT

## 2019-06-27 PROCEDURE — 700102 HCHG RX REV CODE 250 W/ 637 OVERRIDE(OP): Performed by: ORTHOPAEDIC SURGERY

## 2019-06-27 PROCEDURE — 700112 HCHG RX REV CODE 229: Performed by: PHYSICIAN ASSISTANT

## 2019-06-27 PROCEDURE — 700111 HCHG RX REV CODE 636 W/ 250 OVERRIDE (IP): Performed by: PHYSICIAN ASSISTANT

## 2019-06-27 PROCEDURE — 700105 HCHG RX REV CODE 258: Performed by: PHYSICIAN ASSISTANT

## 2019-06-27 PROCEDURE — 97165 OT EVAL LOW COMPLEX 30 MIN: CPT

## 2019-06-27 PROCEDURE — 85018 HEMOGLOBIN: CPT

## 2019-06-27 PROCEDURE — 85014 HEMATOCRIT: CPT

## 2019-06-27 PROCEDURE — A9270 NON-COVERED ITEM OR SERVICE: HCPCS | Performed by: ORTHOPAEDIC SURGERY

## 2019-06-27 PROCEDURE — 97161 PT EVAL LOW COMPLEX 20 MIN: CPT

## 2019-06-27 RX ORDER — HYDROCODONE BITARTRATE AND ACETAMINOPHEN 5; 325 MG/1; MG/1
1 TABLET ORAL ONCE
Status: COMPLETED | OUTPATIENT
Start: 2019-06-27 | End: 2019-06-27

## 2019-06-27 RX ORDER — PSEUDOEPHEDRINE HCL 30 MG
100 TABLET ORAL 2 TIMES DAILY
Qty: 60 CAP | Refills: 0
Start: 2019-06-27 | End: 2019-11-07

## 2019-06-27 RX ORDER — TRAMADOL HYDROCHLORIDE 50 MG/1
50-100 TABLET ORAL EVERY 6 HOURS PRN
Qty: 60 TAB | Refills: 0
Start: 2019-06-27 | End: 2019-07-11

## 2019-06-27 RX ORDER — ASPIRIN 81 MG/1
81 TABLET ORAL 2 TIMES DAILY
Qty: 30 TAB | Refills: 0
Start: 2019-06-27 | End: 2019-11-07

## 2019-06-27 RX ORDER — ONDANSETRON 4 MG/1
4 TABLET, ORALLY DISINTEGRATING ORAL EVERY 4 HOURS PRN
Qty: 10 TAB | Refills: 0
Start: 2019-06-27 | End: 2019-11-07

## 2019-06-27 RX ADMIN — DOCUSATE SODIUM 100 MG: 100 CAPSULE, LIQUID FILLED ORAL at 05:22

## 2019-06-27 RX ADMIN — ACETAMINOPHEN 500 MG: 500 TABLET, FILM COATED ORAL at 05:22

## 2019-06-27 RX ADMIN — LISINOPRIL 10 MG: 5 TABLET ORAL at 05:22

## 2019-06-27 RX ADMIN — FOLIC ACID 1 MG: 1 TABLET ORAL at 05:22

## 2019-06-27 RX ADMIN — PAROXETINE HYDROCHLORIDE 10 MG: 10 TABLET, FILM COATED ORAL at 05:22

## 2019-06-27 RX ADMIN — ASPIRIN 81 MG: 81 TABLET, COATED ORAL at 09:14

## 2019-06-27 RX ADMIN — TRAMADOL HYDROCHLORIDE 50 MG: 50 TABLET, FILM COATED ORAL at 09:14

## 2019-06-27 RX ADMIN — KETOROLAC TROMETHAMINE 15 MG: 30 INJECTION, SOLUTION INTRAMUSCULAR at 05:22

## 2019-06-27 RX ADMIN — HYDROCODONE BITARTRATE AND ACETAMINOPHEN 1 TABLET: 5; 325 TABLET ORAL at 14:52

## 2019-06-27 RX ADMIN — DEXAMETHASONE SODIUM PHOSPHATE 8 MG: 4 INJECTION, SOLUTION INTRAMUSCULAR; INTRAVENOUS at 05:22

## 2019-06-27 RX ADMIN — SODIUM CHLORIDE 2 G: 9 INJECTION, SOLUTION INTRAVENOUS at 05:22

## 2019-06-27 RX ADMIN — ACETAMINOPHEN 500 MG: 500 TABLET, FILM COATED ORAL at 13:07

## 2019-06-27 RX ADMIN — LEVOTHYROXINE SODIUM 125 MCG: 125 TABLET ORAL at 05:22

## 2019-06-27 ASSESSMENT — COGNITIVE AND FUNCTIONAL STATUS - GENERAL
WALKING IN HOSPITAL ROOM: A LITTLE
CLIMB 3 TO 5 STEPS WITH RAILING: A LOT
STANDING UP FROM CHAIR USING ARMS: A LITTLE
MOBILITY SCORE: 20
SUGGESTED CMS G CODE MODIFIER MOBILITY: CJ

## 2019-06-27 ASSESSMENT — ACTIVITIES OF DAILY LIVING (ADL): TOILETING: INDEPENDENT

## 2019-06-27 ASSESSMENT — GAIT ASSESSMENTS
DISTANCE (FEET): 45
DEVIATION: ANTALGIC;STEP TO;DECREASED BASE OF SUPPORT;DECREASED HEEL STRIKE;DECREASED TOE OFF
GAIT LEVEL OF ASSIST: MINIMAL ASSIST
ASSISTIVE DEVICE: FRONT WHEEL WALKER

## 2019-06-27 NOTE — DISCHARGE INSTRUCTIONS
Dr. Davis's Discharge Instructions:   The first week after your joint replacement is a time to recover from the surgery. We expect light exercise to keep you active and mobile. Dr. Davis requires a walker or cane for most patients in the first few days following surgery to try to prevent falls or complications.     Most patients are prescribed two medications for pain control: a narcotic such as Oxycodone or Hydrocodone and a milder medication called Tramadol. These can be alternated for pain control, and the priority is to decrease use of the stronger narcotic as soon as tolerated.   Take your pain medication as appropriate to ensure that your pain is not limiting your recovery. You'll be seen in clinic in 7-10 days (this appointment has already been made, call our office if you're unsure of the time). At that time, we'll prescribe your physical therapy to help with the recovery phase.     -Keep dressing clean and dry. Leave dressing in place until follow up. If you have an incisional vac dressing, the battery may die before your first post operative appointment, but you can leave the surgical dressing in place and it will be removed at your clinic visit. The battery of the dressing may die, and the sponge will inflate because it is no longer holding suction. You can still leave the dressing in place and it will be removed at the office. Call the office if you notice drainage from the surgical dressing.     -OK to shower, keep dressing in place. Pat dressing dry, do not rub incision     -Do not soak incision in bath, hot tub or pool     -Follow up with Dr. Davis at regularly scheduled time     -Call [facility] office at [phone] for questions     -Weight bearing status: as tolerated with walker/cane and posterior hip precautions     -Take medication as prescribed for DVT prophylaxis      Discharge Instructions    Discharged to home by car with relative. Discharged via wheelchair, hospital escort: Yes.  Special  equipment needed: Not Applicable    Be sure to schedule a follow-up appointment with your primary care doctor or any specialists as instructed.     Discharge Plan:   Smoking Cessation Offered: Patient Refused  Influenza Vaccine Indication: Not indicated: Previously immunized this influenza season and > 8 years of age    I understand that a diet low in cholesterol, fat, and sodium is recommended for good health. Unless I have been given specific instructions below for another diet, I accept this instruction as my diet prescription.   Other diet: regular    Special Instructions: Discharge instructions for the Orthopedic Patient    Follow up with Primary Care Physician within 2 weeks of discharge to home, regarding:  Review of medications and diagnostic testing.  Surveillance for medical complications.  Workup and treatment of osteoporosis, if appropriate.     -Is this a Joint Replacement patient? Yes   Total Joint Hip Replacement Discharge Instructions    Pain  - The goal is to slowly wean off the prescription pain medicine.  - Ice can be used for pain control.  20 minutes at a time is recommended, and never directly against your skin or incision.  - Most patients are off the pain pills by 3 weeks; others may require a low level of pain medications for many months. If your pain continues to be severe, follow up with your physician.  Infection  Deep hip joint infections that require removal of the prostheses occur in less than 0.1% of patients. Lesser infections in the skin (cellulites) are more common and much more easily treated.  - Keep the incision as clean and dry as possible.  - Always wash your hands before touching your incision.  - Skin infections tend to develop around 7-10 days after surgery, most can be treated with oral antibiotics.  - Dental Care should be delayed for 3 months after surgery, your surgeon recommends taking a dose of antibiotics 1 hour prior to any dental procedure.  After 2 years, most  surgeons recommend antibiotics only before an extensive procedure.  Ask your surgeon what he recommends.  - Signs and symptoms of infection can include:  low grade fever, redness, pain, swelling and drainage from your incision.  Notify your surgeon immediately if you develop any of these symptoms.  Post op Disturbances  - Bowel habits - constipation is extremely common and is caused by a combination of anesthesia, lack of mobility and pain medicine.  Use stool softeners or laxatives if necessary. It is important not to ignore this problem, as bowel obstructions can be a serious complication after joint replacement surgery.  - Mood/Energy Level - Many patients experience a lack of energy and endurance for up to 2-3 months after surgery.  Some may also feel down and can even become depressed.  This is likely due to the postoperative anemia, change in activity level, lack of sleep, pain medicine and just the emotional reaction to the surgery itself that is a big disruption in a person’s life.  This usually passes.  If symptoms persist, follow up with your primary physician.  - Returning to work - Your surgeon will give you more specific instructions.  Generally, if you work a sedentary job requiring little standing or walking, most patients may return within 2-6 weeks.  Manual labor jobs involving walking, lifting and standing may take 3-4 months.  Your surgeon’s office can provide a release to part-time or light duty work early on in your recovery and progress you to full duty as able.  - Driving - You can begin driving an automatic shift car in 4 to 8 weeks, provided you are no longer taking narcotic pain medication. If you have a stick-shift car and your right hip was replaced, do not begin driving until your doctor says you can.   - Avoiding falls -  throw rugs and tack down loose carpeting.  Be aware of floor hazards such as pets, small objects or uneven surfaces.   -  Airport Metal Detectors - The  sensitivity of metal detectors varies and it is likely that your prosthesis will cause an alarm. Inform the  that you have an artificial joint.  Diet  - Resume your normal diet as tolerated.  - It is important to achieve a healthy nutritional status by eating a well balanced diet on a regular basis.  - Your physician may recommend that you take iron and vitamin supplements.   - Continue to drink plenty of fluids.  Shower/Bathing  - You may shower as soon as you get home from the hospital unless otherwise instructed.  - Keep your incision out of water.  To keep the incision dry when showering, cover it with a plastic bag or plastic wrap.  - Pat incision dry if it gets wet.  Don’t rub.  - Do not submerge in a bath until staples are out and the incision is completely healed. (Approximately 6-8 weeks after surgery).  Dressing Change:  Procedure (if recommended by your physician)  - Wash hands.  - Open all dressing change materials.  - Remove old dressing and discard.  - Inspect incision for redness, increase in clear drainage, yellow/green drainage, odor and surrounding skin hot to touch.  -  ABD (large gauze) pad by one corner and lay over the incision.  Be careful not to touch the inside of the dressing that will lay over the incision.  - Secure in place as instructed (Ace wrap or tape).    Swelling/Bruising  - Swelling is normal after hip replacement and can involve the thigh, knee, calf and foot.  - Swelling can last from 3-6 months.  - Elevate your leg higher than your heart while reclining.  The first week you are home you should elevate your leg an equal amount of time, as you are active.    - Anti-inflammatory pills can be taken once you have stopped the blood thinners.  - The swelling is usually worse after you go home since you are upright for longer periods of time.  - Bruising is common and can involve the entire leg including the thigh, calf and even foot.  Bruising often does not  appear until after you arrive home and it can be quite dramatic- purple, black, green.  The bruising you can see is not usually concerning and will subside without any treatment.      Blood Clot Prevention  Blood clots in the legs and the less common, but frightening, clots that travel to the lungs are a real focus of our preventative. Most patients are at standard risk for them, but those patients who are at higher risk include people who have had previous clots, a family history of clotting, smoking, diabetes, obesity, advanced age, use of estrogen and a sedentary lifestyle.    - Signs of blood clots in legs - Swelling in thigh, calf or ankle that does not go down with elevation.  Pain, heat and tenderness in calf, back of calf or groin area.  NOTE: blood clots can occur in either leg.  - You have been receiving anticoagulant therapy (blood thinners) in the hospital and you may be instructed to continue at home depending on your risk factors.  - Your risk for developing a clot continues for up to 2-3 months after surgery.  You should avoid prolonged sitting and dehydration during that time (long air trips and car trips).  If you do take a trip during this time, please get up and move around every 1- 1.5 hours.  - If you are prescribed blood thinning medication for home, follow instructions as directed. (Handouts provided if applicable).      Activity    Once you get home, you should stay active. The key is not to overdo it! While you can expect some good days and some bad days, you should notice a gradual improvement over time you should notice a gradual improvement and a gradual increase in your endurance over the next 6 to 12 months.    - Weight Bearing - If you have undergone cemented or hybrid hip replacement, you can put some weight on the leg immediately using a cane or walker, and you should continue to use some support for 4 to 6 weeks to help the muscles recover.   - Sleeping Positions - Sleep on your  back with your legs slightly apart or on your side with a regular pillow between your knees. Be sure to use the pillow for at least 6 weeks, or until your doctor says you can do without it. Sleeping on your stomach should be all right  - Sitting - For at least the first 3 months, sit only in chairs that have arms. Do not sit on low chairs, low stools, or reclining chairs. Do not cross your legs at the knees. The physical therapist will show you how to sit and stand from a chair, keeping your affected leg out in front of you. Get up and move around on a regular basis--at least once every hour.  - Walking - Walk as much as you like once your doctor gives you the go-ahead, but remember that walking is no substitute for your prescribed exercises. Walking with a pair of trekking poles is helpful and adds as much as 40% to the exercise you get when you walk  - Therapy may be needed in some cases, to strengthen your muscles and improve your gait (walking pattern).  This decision will be made at your post-operative appointment.  Follow your therapist recommended post-operative exercises (handout provided by Therapist).  - Swimming is also recommended; you can begin as soon as the sutures have been removed and the wound is healed, approximately 6 to 8 weeks after surgery. Using a pair of training fins may make swimming a more enjoyable and effective exercise.  - Other activities - Lower impact activities are preferred.  If you have specific questions, consult your Surgeon.    - Sexual activity - Your surgeon can tell you when it should be safe to resume sexual activity.      When to Call the Doctor   Call the physician if:   - Fever over 100.5? F  - Increased pain, drainage, redness, odor or heat around the incision area  - Shaking chills  - Increased knee pain with activity and rest  - Increased pain in calf, tenderness or redness above or below the knee  - Increased swelling of calf, ankle, foot  - Sudden increased  shortness of breath, sudden onset of chest pain, localized chest pain with coughing  - Incision opening  Or, if there are any questions or concerns about medications or care.       -Is this patient being discharged with medication to prevent blood clots?  Yes, Aspirin Aspirin, ASA oral tablets  What is this medicine?  ASPIRIN (AS pir in) is a pain reliever. It is used to treat mild pain and fever. This medicine is also used as directed by a doctor to prevent and to treat heart attacks, to prevent strokes, and to treat arthritis or inflammation.  This medicine may be used for other purposes; ask your health care provider or pharmacist if you have questions.  COMMON BRAND NAME(S): Aspir-Low, Aspir-Sara, Aspirtab, Dajuan Advanced Aspirin, Dajuan Aspirin, Dajuan Aspirin Extra Strength, Dajuan Aspirin Plus, Dajuan Extra Strength, Dajuan Extra Strength Plus, Dajuan Genuine Aspirin, Dajuan Womens Aspirin, Bufferin, Bufferin Extra Strength, Bufferin Low Dose  What should I tell my health care provider before I take this medicine?  They need to know if you have any of these conditions:  -anemia  -asthma  -bleeding problems  -child with chickenpox, the flu, or other viral infection  -diabetes  -gout  -if you frequently drink alcohol containing drinks  -kidney disease  -liver disease  -low level of vitamin K  -lupus  -smoke tobacco  -stomach ulcers or other problems  -an unusual or allergic reaction to aspirin, tartrazine dye, other medicines, dyes, or preservatives  -pregnant or trying to get pregnant  -breast-feeding  How should I use this medicine?  Take this medicine by mouth with a glass of water. Follow the directions on the package or prescription label. You can take this medicine with or without food. If it upsets your stomach, take it with food. Do not take your medicine more often than directed.  Talk to your pediatrician regarding the use of this medicine in children. While this drug may be prescribed for children as young  as 12 years of age for selected conditions, precautions do apply. Children and teenagers should not use this medicine to treat chicken pox or flu symptoms unless directed by a doctor.  Patients over 65 years old may have a stronger reaction and need a smaller dose.  Overdosage: If you think you have taken too much of this medicine contact a poison control center or emergency room at once.  NOTE: This medicine is only for you. Do not share this medicine with others.  What if I miss a dose?  If you are taking this medicine on a regular schedule and miss a dose, take it as soon as you can. If it is almost time for your next dose, take only that dose. Do not take double or extra doses.  What may interact with this medicine?  Do not take this medicine with any of the following medications:  -cidofovir  -ketorolac  -probenecid  This medicine may also interact with the following medications:  -alcohol  -alendronate  -bismuth subsalicylate  -flavocoxid  -herbal supplements like feverfew, garlic, flaquito, ginkgo biloba, horse chestnut  -medicines for diabetes or glaucoma like acetazolamide, methazolamide  -medicines for gout  -medicines that treat or prevent blood clots like enoxaparin, heparin, ticlopidine, warfarin  -other aspirin and aspirin-like medicines  -NSAIDs, medicines for pain and inflammation, like ibuprofen or naproxen  -pemetrexed  -sulfinpyrazone  -varicella live vaccine  This list may not describe all possible interactions. Give your health care provider a list of all the medicines, herbs, non-prescription drugs, or dietary supplements you use. Also tell them if you smoke, drink alcohol, or use illegal drugs. Some items may interact with your medicine.  What should I watch for while using this medicine?  If you are treating yourself for pain, tell your doctor or health care professional if the pain lasts more than 10 days, if it gets worse, or if there is a new or different kind of pain. Tell your doctor if  you see redness or swelling. Also, check with your doctor if you have a fever that lasts for more than 3 days. Only take this medicine to prevent heart attacks or blood clotting if prescribed by your doctor or health care professional.  Do not take aspirin or aspirin-like medicines with this medicine. Too much aspirin can be dangerous. Always read the labels carefully.  This medicine can irritate your stomach or cause bleeding problems. Do not smoke cigarettes or drink alcohol while taking this medicine. Do not lie down for 30 minutes after taking this medicine to prevent irritation to your throat.  If you are scheduled for any medical or dental procedure, tell your healthcare provider that you are taking this medicine. You may need to stop taking this medicine before the procedure.  This medicine may be used to treat migraines. If you take migraine medicines for 10 or more days a month, your migraines may get worse. Keep a diary of headache days and medicine use. Contact your healthcare professional if your migraine attacks occur more frequently.  What side effects may I notice from receiving this medicine?  Side effects that you should report to your doctor or health care professional as soon as possible:  -allergic reactions like skin rash, itching or hives, swelling of the face, lips, or tongue  -breathing problems  -changes in hearing, ringing in the ears  -confusion  -general ill feeling or flu-like symptoms  -pain on swallowing  -redness, blistering, peeling or loosening of the skin, including inside the mouth or nose  -signs and symptoms of bleeding such as bloody or black, tarry stools; red or dark-brown urine; spitting up blood or brown material that looks like coffee grounds; red spots on the skin; unusual bruising or bleeding from the eye, gums, or nose  -trouble passing urine or change in the amount of urine  -unusually weak or tired  -yellowing of the eyes or skin  Side effects that usually do not  require medical attention (report to your doctor or health care professional if they continue or are bothersome):  -diarrhea or constipation  -headache  -nausea, vomiting  -stomach gas, heartburn  This list may not describe all possible side effects. Call your doctor for medical advice about side effects. You may report side effects to FDA at 8-398-AIN-6038.  Where should I keep my medicine?  Keep out of the reach of children.  Store at room temperature between 15 and 30 degrees C (59 and 86 degrees F). Protect from heat and moisture. Do not use this medicine if it has a strong vinegar smell. Throw away any unused medicine after the expiration date.  NOTE: This sheet is a summary. It may not cover all possible information. If you have questions about this medicine, talk to your doctor, pharmacist, or health care provider.  © 2018 [employer]/Gold Standard (2014-08-19 11:30:31)      · Is patient discharged on Warfarin / Coumadin?   No     Depression / Suicide Risk    As you are discharged from this [facility] facility, it is important to learn how to keep safe from harming yourself.    Recognize the warning signs:  · Abrupt changes in personality, positive or negative- including increase in energy   · Giving away possessions  · Change in eating patterns- significant weight changes-  positive or negative  · Change in sleeping patterns- unable to sleep or sleeping all the time   · Unwillingness or inability to communicate  · Depression  · Unusual sadness, discouragement and loneliness  · Talk of wanting to die  · Neglect of personal appearance   · Rebelliousness- reckless behavior  · Withdrawal from people/activities they love  · Confusion- inability to concentrate     If you or a loved one observes any of these behaviors or has concerns about self-harm, here's what you can do:  · Talk about it- your feelings and reasons for harming yourself  · Remove any means that you might use to hurt yourself (examples: pills,  rope, extension cords, firearm)  · Get professional help from the community (Mental Health, Substance Abuse, psychological counseling)  · Do not be alone:Call your Safe Contact- someone whom you trust who will be there for you.  · Call your local CRISIS HOTLINE 205-8005 or 920-197-0932  · Call your local Children's Mobile Crisis Response Team [place] (563) 094-3566 or www.[employer]  · Call the toll free National Suicide Prevention Hotlines   · National Suicide Prevention Lifeline 155-426-FSMN (6179)  · National Hope Line Network 800-SUICIDE (180-9639)

## 2019-06-27 NOTE — PROGRESS NOTES
Patient a+o x 4, denies dizziness/lightheadness/numbness/tingling/sob/chest pain/n/v/d. +csm/pulses to LLE.  Denies pain. SCD's/teds/polar ice present. Dressing c/d/i. Tolerating diet,  Fall precautions/hourly rounding maintained. Call light within reach and functioning, all items within reach, poc reviewed with patient, ?'s/concerns answered.

## 2019-06-27 NOTE — FLOWSHEET NOTE
06/26/19 1728   Interdisciplinary Plan of Care-Goals (Indications)   Hyperinflation Protocol Indications (post-op IS)   Education   Education Yes - Pt. / Family has been Instructed in use of Respiratory Equipment   Incentive Spirometry Group   Incentive Spirometry Instruction Yes   Incentive Spirometer Volume 2000 mL   Chest Exam   Respiration 16   Pulse 60   Breath Sounds   RUL Breath Sounds Clear   RML Breath Sounds Clear   RLL Breath Sounds Diminished   BRANDIE Breath Sounds Clear   LLL Breath Sounds Diminished   Oximetry   Continuous Oximetry Yes   Oxygen   Home O2 Use Prior To Admission? No   Pulse Oximetry 99 %   O2 (LPM) 4   O2 Daily Delivery Respiratory  OxyMask

## 2019-06-27 NOTE — PROGRESS NOTES
Patient to be discharged today - patient aware and agreeable to plan. D/c instructions reviewed with patient - ?'s/concerns answered. Patient educated on pain regimen, s&s of blood clots/infection, and mobility restrictions.

## 2019-06-27 NOTE — PROGRESS NOTES
S:  s/p left ROSIE  Pain controlled, doesn't want to take Oxycodone  No N/V  No Chest Pain/SOB  Afebrile  Exam:    NAD A& O x3    RLE: +DF/PF/EHL SILT L4/L5/S1  LLE:  +DF/PF/EHL SILT L4/L5/S1    Plan:  Continue standard plan of care  Weight bearing: as tolerated with walker/cane  DVT prophylaxis: SCD/Teds + ASA  Dispo: home today

## 2019-06-27 NOTE — PROGRESS NOTES
Pt arrived to floor from PACU. Assumed care of patient. Discussed daily plan of care, oriented patient to floor. VSS. Complains of no nausea, reports 8/10 pain at this time. Hourly rounding in place.

## 2019-06-27 NOTE — THERAPY
"Occupational Therapy Evaluation completed.   Functional Status:  Pt is a 55 y/o female, admit for a L ROSIE. Pt lives with her significant other and daughter. Friends and Sig other will be able yesica assist with care after d/c. PLOF- I with AMB ADLS with the use of a cane. Pt had a recent R ROSIE. Pt presents with minimal c/o pain with ADLS and functional mobility, is at the Supervised to close Supervision level . Pt was educated regarding THP, DME, AE, and techniques for ADLS and functional mobility, post surgery. Pt will be seen for initial evaluation and treatment only, as she is functional in this setting with ADLS and functional transfers.  Plan of Care: Patient with no further skilled OT needs in the acute care setting at this time  Discharge Recommendations:  Equipment: No Equipment Needed. Post-acute therapy Discharge to home with outpatient or home health for additional skilled therapy services.    See \"Rehab Therapy-Acute\" Patient Summary Report for complete documentation.    "

## 2019-06-27 NOTE — THERAPY
"Physical Therapy Evaluation completed.   Bed Mobility:  Supine to Sit: Supervised  Transfers: Sit to Stand: Supervised  Gait: Level Of Assist: Minimal Assist with Front-Wheel Walker       Plan of Care: Will benefit from Physical Therapy 5 times per week  Discharge Recommendations: Equipment: Front-Wheel Walker. Recommend outpatient physical therapy services to address higher level deficits.    See \"Rehab Therapy-Acute\" Patient Summary Report for complete documentation.     Pt was seen s/p L ROSIE with WBAT orders and posterior hip precautions. Pt was able to demonstrate SPV to Min A for all functional mobility with FWW use. Pt was primarily limited due to pain and anxiety. Pt was only able to tolerate 45ft of ambulation at this time. Pt demonstrates with poor heel to mechanics and tends to land on lateral sides of foot on LLE, attempts correction, however, primarily focused on pain. Pt educated on icing, elevation, positioning, and limiting activity and avoided painful events. Pt educated on supine ther-ex, and able to demonstrate appropriate mechanics. Pt will continue to benefit from continued acute skilled PT while in house, anticipate pt to d/c home once pain is medically managaged and able to tolerate increased activity and ambulation. Pt encouarged to mobilize with nsg as able. Will continue to follow.   "

## 2019-06-27 NOTE — DISCHARGE SUMMARY
Abdias Viera was admitted on 6/26/2019 for UNILATERAL PRIMARY OSTEOARTHRITIS LEFT HIP WITH PAIN  Primary osteoarthritis of left hip  Primary osteoarthritis of left hip  Patient was diagnosed with severe degenerative arthritis in the left hip and underwent a left total hip arthroplasty by Dr. Janet Davis on the date of admission. Please see dictated operative note for further information.    Hospital course: standard    The patient has done well, with no complications.  Patient denies chest pain, calf pain or shortness of breath.   Pain is well-controlled at present.  Patient is ambulating well with the use of an assistive device, and progressing in physical therapy.   Patient is neurologically and vascularly intact with palpable pedal pulses bilaterally.   Narcotic dosages were chosen by taking into account the the patient's previous history of opoid use and to ensure proper pain control after surgery    Discharge date: 6-27-19    Patient is being discharged to home after am physical therapy.     Allergies:  Patient has no known allergies.       Medication List      START taking these medications      Instructions   aspirin 81 MG EC tablet   Take 1 Tab by mouth 2 times a day.  Dose:  81 mg     docusate sodium 100 MG Caps   Take 100 mg by mouth 2 Times a Day.  Dose:  100 mg     ondansetron 4 MG Tbdp  Commonly known as:  ZOFRAN ODT   Take 1 Tab by mouth every four hours as needed for Nausea.  Dose:  4 mg     tramadol 50 MG Tabs  Commonly known as:  ULTRAM   Take 1-2 Tabs by mouth every 6 hours as needed (Moderate Pain (NRS Pain Scale 4-6; CPOT Pain Scale 3-5) if opiates not ordered or tolerated) for up to 14 days.  Dose:   mg        CONTINUE taking these medications      Instructions   acetaminophen 500 MG Tabs  Commonly known as:  TYLENOL   Take 1 Tab by mouth every 6 hours.  Dose:  500 mg     folic acid 1 MG Tabs  Commonly known as:  FOLVITE   Take 1 mg by mouth every day.  Dose:  1 mg      levothyroxine 125 MCG Tabs  Commonly known as:  SYNTHROID   Take 1 Tab by mouth Every morning on an empty stomach.  Dose:  125 mcg     lisinopril-hydrochlorothiazide 10-12.5 MG per tablet  Commonly known as:  PRINZIDE, ZESTORETIC   TAKE 1 TABLET BY MOUTH ONCE DAILY     meloxicam 7.5 MG Tabs  Commonly known as:  MOBIC   Take 7.5 mg by mouth every day.  Dose:  7.5 mg     PARoxetine 10 MG Tabs  Commonly known as:  PAXIL   Take 1 Tab by mouth every day.  Dose:  10 mg     sulfADIAZINE 500 MG Tabs   Take 500 mg by mouth 2 Times a Day. 2 pills in am and 2 pills in pm  Dose:  500 mg     Vitamin B Complex Tabs   Take  by mouth.     VITAMIN D BOOSTER PO   Take  by mouth.     XELJANZ 5 MG Tabs  Generic drug:  Tofacitinib Citrate   Take 1 Tab by mouth 2 Times a Day.  Dose:  1 Tab        STOP taking these medications    ALEVE PO            Discharge Instructions:     Patient is instructed to ambulate and weight bear as tolerated with the use of an assistive device, and to continue physical therapy exercises given during this hospital stay. Strict posterior hip precautions are to be observed for patients who underwent a total hip replacement.   Patient is to ice and elevate the surgical leg regularly, with pillows under the ankle, nothing is to be placed under the knee.   Patient was given detailed wound care instructions, and will leave the Incisional vac or silver dressing on until first post-op visit.   ASA twice daily for DVT prophylaxis.  Patient is to follow up with Dr. Davis's office in 1-2 weeks.

## 2019-06-27 NOTE — PROGRESS NOTES
Patient requesting a one time dose of norco and a script to  for home 2/2 tramadol not controlling pain adequately. Called Dr. Davis's office, waiting for a call back. Patient updated.     Spoke with fernando Lea to put a one time dose of norco (5/325) and patient to  script at office.

## 2019-06-28 ENCOUNTER — PATIENT OUTREACH (OUTPATIENT)
Dept: HEALTH INFORMATION MANAGEMENT | Facility: OTHER | Age: 54
End: 2019-06-28

## 2019-07-01 ENCOUNTER — PATIENT OUTREACH (OUTPATIENT)
Dept: HEALTH INFORMATION MANAGEMENT | Facility: OTHER | Age: 54
End: 2019-07-01

## 2019-07-26 ENCOUNTER — PATIENT OUTREACH (OUTPATIENT)
Dept: HEALTH INFORMATION MANAGEMENT | Facility: OTHER | Age: 54
End: 2019-07-26

## 2019-07-31 NOTE — PROGRESS NOTES
A 54-year-old female was an elective admission to [facility] from 6/26/2019 to 6/27/2019 to treat Unilateral primary osteoarthritis, left hip. IHD visited the patient bedside. The patient was discharged Home. The patient's medical conditions included: Mental Health anxiety and Musculoskeletal Disease Arthritis. The patient was not under clinical case management.    The Patient was discharged with the following medications: Tramadol Hydrochloride , Ondansetron , Docusate Sodium, and Aspirin. The patient successfully filled all medications.     The patient was ordered to follow-up with Specialist and PCP. The patient had the following appointments:     1) 6/27/2019 @ 9:00 Phoebe Lancaster, general/family practice -patient declined appointment     2) 7/11/2019 @ 10:45 Janet Davis, orthopedic surgery - appointment kept  The patient has a future appointment scheduled for 8/8/2019 @ 11:15 Janet Davis, orthopedic surgery.    IHD communicated with the patient  via phone successfully throughout the case and collaborated with stakeholders on behalf of the patient regarding coordination of care. IHD identified that the patient had no barriers.    Patient was discharge with a low lace score , therefore, a PPS screening was not conducted for the  patient.

## 2019-08-02 ENCOUNTER — PATIENT OUTREACH (OUTPATIENT)
Dept: HEALTH INFORMATION MANAGEMENT | Facility: OTHER | Age: 54
End: 2019-08-02

## 2019-08-02 NOTE — PROGRESS NOTES
Outcome: Left Message    Please transfer to Patient Outreach Team at [phone] when patient returns call.    [facility] Verified: yes    Attempt # 1 IHD handoff

## 2019-08-08 NOTE — PROGRESS NOTES
Pt stated she is still recovering from her hip surgery and does not want to schedule AWV yet. Pt will call when she is ready to schedule

## 2019-08-13 NOTE — TELEPHONE ENCOUNTER
Was the patient seen in the last year in this department? Yes    Does patient have an active prescription for medications requested? No     Received Request Via: Pharmacy    Pt met protocol?: No     Last OV 12/31/18    BP Readings from Last 1 Encounters:   06/27/19 111/50

## 2019-08-14 RX ORDER — LISINOPRIL AND HYDROCHLOROTHIAZIDE 12.5; 1 MG/1; MG/1
TABLET ORAL
Qty: 100 TAB | Refills: 1 | Status: SHIPPED | OUTPATIENT
Start: 2019-08-14 | End: 2020-03-23

## 2019-08-14 NOTE — TELEPHONE ENCOUNTER
Refill X 6 months, sent to pharmacy.Pt. Seen in the last 6 months per protocol.   Lab Results   Component Value Date/Time    SODIUM 139 06/06/2019 10:44 AM    POTASSIUM 3.6 06/06/2019 10:44 AM    CHLORIDE 103 06/06/2019 10:44 AM    CO2 26 06/06/2019 10:44 AM    GLUCOSE 108 (H) 06/06/2019 10:44 AM    BUN 15 06/06/2019 10:44 AM    CREATININE 0.80 06/06/2019 10:44 AM

## 2019-08-30 ENCOUNTER — OFFICE VISIT (OUTPATIENT)
Dept: MEDICAL GROUP | Facility: PHYSICIAN GROUP | Age: 54
End: 2019-08-30
Payer: MEDICARE

## 2019-08-30 VITALS
HEIGHT: 64 IN | WEIGHT: 195 LBS | TEMPERATURE: 99.6 F | SYSTOLIC BLOOD PRESSURE: 104 MMHG | BODY MASS INDEX: 33.29 KG/M2 | RESPIRATION RATE: 18 BRPM | HEART RATE: 84 BPM | OXYGEN SATURATION: 93 % | DIASTOLIC BLOOD PRESSURE: 60 MMHG

## 2019-08-30 DIAGNOSIS — M06.9 FLARE OF RHEUMATOID ARTHRITIS (HCC): ICD-10-CM

## 2019-08-30 PROCEDURE — 99214 OFFICE O/P EST MOD 30 MIN: CPT | Performed by: FAMILY MEDICINE

## 2019-08-30 RX ORDER — PREDNISONE 10 MG/1
TABLET ORAL
Qty: 63 TAB | Refills: 0 | Status: SHIPPED | OUTPATIENT
Start: 2019-08-30 | End: 2019-11-07

## 2019-08-30 ASSESSMENT — PATIENT HEALTH QUESTIONNAIRE - PHQ9: CLINICAL INTERPRETATION OF PHQ2 SCORE: 0

## 2019-08-30 NOTE — PROGRESS NOTES
Chief Complaint   Patient presents with   • Foot Pain     rt ankle pain       HISTORY OF PRESENT ILLNESS: Patient is a 54 y.o. female established patient here today for the following concerns:    1. Flare of rheumatoid arthritis (HCC)  Abdias is here with sudden onset of right ankle pain that has been associated with swelling and stiffness.  Initially thought maybe it was due to the way she was walking with having had the hip replacement.  Hx of bony cyst in the other ankle that required surgical intervention when she was younger.  No pain in this ankle.  No redness noted.  It is tender to the touch.  No fevers, chills.        Past Medical, Social, and Family history reviewed and updated in EPIC    Allergies:Patient has no known allergies.    Current Outpatient Medications   Medication Sig Dispense Refill   • predniSONE (DELTASONE) 10 MG Tab Take 40 mg for 3 days, then 30 mg for 3 days, then 20 mg for 3 days, then 10 mg for 3 days then stop 63 Tab 0   • lisinopril-hydrochlorothiazide (PRINZIDE, ZESTORETIC) 10-12.5 MG per tablet TAKE 1 TABLET BY MOUTH ONCE DAILY 100 Tab 1   • ondansetron (ZOFRAN ODT) 4 MG TABLET DISPERSIBLE Take 1 Tab by mouth every four hours as needed for Nausea. 10 Tab 0   • SulfADIAZINE 500 MG Tab Take 500 mg by mouth 2 Times a Day. 2 pills in am and 2 pills in pm     • Nutritional Supplements (VITAMIN D BOOSTER PO) Take  by mouth.     • acetaminophen (TYLENOL) 500 MG Tab Take 1 Tab by mouth every 6 hours. 60 Tab 1   • meloxicam (MOBIC) 7.5 MG Tab Take 7.5 mg by mouth every day.     • levothyroxine (SYNTHROID) 125 MCG Tab Take 1 Tab by mouth Every morning on an empty stomach. 90 Tab 3   • PARoxetine (PAXIL) 10 MG Tab Take 1 Tab by mouth every day. 90 Tab 3   • XELJANZ 5 MG Tab Take 1 Tab by mouth 2 Times a Day.  1   • B Complex Vitamins (VITAMIN B COMPLEX) Tab Take  by mouth.     • folic acid (FOLVITE) 1 MG TABS Take 1 mg by mouth every day.     • aspirin EC 81 MG EC tablet Take 1 Tab by mouth 2  "times a day. (Patient not taking: Reported on 8/30/2019) 30 Tab 0   • docusate sodium 100 MG Cap Take 100 mg by mouth 2 Times a Day. (Patient not taking: Reported on 8/30/2019) 60 Cap 0     No current facility-administered medications for this visit.          ROS:  Review of Systems   Constitutional: Negative for fever, chills, weight loss and malaise/fatigue.   HENT: Negative for ear pain, nosebleeds, congestion, sore throat and neck pain.    Eyes: Negative for blurred vision.   Respiratory: Negative for cough, sputum production, shortness of breath and wheezing.    Cardiovascular: Negative for chest pain, palpitations,  and leg swelling.   Gastrointestinal: Negative for heartburn, nausea, vomiting, diarrhea and abdominal pain.   Genitourinary: Negative for dysuria, urgency and frequency.   Musculoskeletal: Negative for myalgias, back pain and + joint pain.   Skin: Negative for rash and itching.   Neurological: Negative for dizziness, tingling, tremors, sensory change, focal weakness and headaches.   Endo/Heme/Allergies: Does not bruise/bleed easily.   Psychiatric/Behavioral: Negative for depression, anxiety, suicidal ideas, insomnia and memory loss.      Exam:  /60 (BP Location: Left arm, Patient Position: Sitting, BP Cuff Size: Large adult)   Pulse 84   Temp 37.6 °C (99.6 °F)   Resp 18   Ht 1.626 m (5' 4\")   Wt 88.5 kg (195 lb)   SpO2 93%     General:  Well nourished, well developed in NAD  Head is grossly normal.  Neck: Supple without JVD   Pulmonary:  Normal effort.   Cardiovascular: Regular rate  Extremities: no clubbing, cyanosis, or edema.  Psych: affect appropriate  MSk: right medial and anterior soft tissue bogginess of the right ankle consistent with soft tissue swelling/tenosynovitis     Please note that this dictation was created using voice recognition software. I have made every reasonable attempt to correct obvious errors, but I expect that there are errors of grammar and possibly content " that I did not discover before finalizing the note.    Assessment/Plan:  1. Flare of rheumatoid arthritis (HCC)    - predniSONE (DELTASONE) 10 MG Tab; Take 40 mg for 3 days, then 30 mg for 3 days, then 20 mg for 3 days, then 10 mg for 3 days then stop  Dispense: 63 Tab; Refill: 0    Follow up as needed.

## 2019-08-31 ENCOUNTER — HOSPITAL ENCOUNTER (OUTPATIENT)
Dept: LAB | Facility: MEDICAL CENTER | Age: 54
End: 2019-08-31
Attending: SPECIALIST
Payer: MEDICARE

## 2019-08-31 LAB
25(OH)D3 SERPL-MCNC: 24 NG/ML (ref 30–100)
ALBUMIN SERPL BCP-MCNC: 4.5 G/DL (ref 3.2–4.9)
ALP SERPL-CCNC: 92 U/L (ref 30–99)
ALT SERPL-CCNC: 27 U/L (ref 2–50)
AST SERPL-CCNC: 21 U/L (ref 12–45)
BASOPHILS # BLD AUTO: 0.7 % (ref 0–1.8)
BASOPHILS # BLD: 0.03 K/UL (ref 0–0.12)
BILIRUB CONJ SERPL-MCNC: 0.1 MG/DL (ref 0.1–0.5)
BILIRUB INDIRECT SERPL-MCNC: 0.6 MG/DL (ref 0–1)
BILIRUB SERPL-MCNC: 0.7 MG/DL (ref 0.1–1.5)
CREAT SERPL-MCNC: 0.86 MG/DL (ref 0.5–1.4)
EOSINOPHIL # BLD AUTO: 0.22 K/UL (ref 0–0.51)
EOSINOPHIL NFR BLD: 4.9 % (ref 0–6.9)
ERYTHROCYTE [DISTWIDTH] IN BLOOD BY AUTOMATED COUNT: 47.6 FL (ref 35.9–50)
HCT VFR BLD AUTO: 41.2 % (ref 37–47)
HGB BLD-MCNC: 12.8 G/DL (ref 12–16)
IMM GRANULOCYTES # BLD AUTO: 0.01 K/UL (ref 0–0.11)
IMM GRANULOCYTES NFR BLD AUTO: 0.2 % (ref 0–0.9)
LYMPHOCYTES # BLD AUTO: 1.01 K/UL (ref 1–4.8)
LYMPHOCYTES NFR BLD: 22.3 % (ref 22–41)
MCH RBC QN AUTO: 29.5 PG (ref 27–33)
MCHC RBC AUTO-ENTMCNC: 31.1 G/DL (ref 33.6–35)
MCV RBC AUTO: 94.9 FL (ref 81.4–97.8)
MONOCYTES # BLD AUTO: 0.33 K/UL (ref 0–0.85)
MONOCYTES NFR BLD AUTO: 7.3 % (ref 0–13.4)
NEUTROPHILS # BLD AUTO: 2.92 K/UL (ref 2–7.15)
NEUTROPHILS NFR BLD: 64.6 % (ref 44–72)
NRBC # BLD AUTO: 0 K/UL
NRBC BLD-RTO: 0 /100 WBC
PLATELET # BLD AUTO: 244 K/UL (ref 164–446)
PMV BLD AUTO: 11 FL (ref 9–12.9)
PROT SERPL-MCNC: 7.3 G/DL (ref 6–8.2)
RBC # BLD AUTO: 4.34 M/UL (ref 4.2–5.4)
WBC # BLD AUTO: 4.5 K/UL (ref 4.8–10.8)

## 2019-08-31 PROCEDURE — 80076 HEPATIC FUNCTION PANEL: CPT

## 2019-08-31 PROCEDURE — 85025 COMPLETE CBC W/AUTO DIFF WBC: CPT

## 2019-08-31 PROCEDURE — 36415 COLL VENOUS BLD VENIPUNCTURE: CPT

## 2019-08-31 PROCEDURE — 82306 VITAMIN D 25 HYDROXY: CPT

## 2019-08-31 PROCEDURE — 82565 ASSAY OF CREATININE: CPT

## 2019-09-13 DIAGNOSIS — N95.9 MENOPAUSAL DISORDER: ICD-10-CM

## 2019-09-13 RX ORDER — PAROXETINE 10 MG/1
TABLET, FILM COATED ORAL
Qty: 90 TAB | Refills: 1 | Status: SHIPPED | OUTPATIENT
Start: 2019-09-13 | End: 2020-04-20

## 2019-09-13 NOTE — TELEPHONE ENCOUNTER
Was the patient seen in the last year in this department? Yes    Does patient have an active prescription for medications requested? No     Received Request Via: Pharmacy      Pt met protocol?: Yes    LAST OV 08/30/2019

## 2019-10-15 ENCOUNTER — PATIENT MESSAGE (OUTPATIENT)
Dept: HEALTH INFORMATION MANAGEMENT | Facility: OTHER | Age: 54
End: 2019-10-15

## 2019-10-15 DIAGNOSIS — Z12.31 VISIT FOR SCREENING MAMMOGRAM: ICD-10-CM

## 2019-10-18 DIAGNOSIS — E03.9 ACQUIRED HYPOTHYROIDISM: Primary | ICD-10-CM

## 2019-10-18 RX ORDER — LEVOTHYROXINE SODIUM 0.12 MG/1
TABLET ORAL
Qty: 30 TAB | Refills: 0 | Status: SHIPPED | OUTPATIENT
Start: 2019-10-18 | End: 2019-11-05 | Stop reason: SDUPTHER

## 2019-10-18 NOTE — TELEPHONE ENCOUNTER
Past due on labs and last lab wasn't WNL. 30 days authorized only.    TSH   Date Value Ref Range Status   04/27/2018 0.310 (L) 0.380 - 5.330 uIU/mL Final     Comment:     Please note new reference ranges effective 12/14/2017 10:00 AM  Pregnant Females, 1st Trimester  0.050-3.700  Pregnant Females, 2nd Trimester  0.310-4.350  Pregnant Females, 3rd Trimester  0.410-5.180

## 2019-11-06 ENCOUNTER — TELEPHONE (OUTPATIENT)
Dept: MEDICAL GROUP | Facility: PHYSICIAN GROUP | Age: 54
End: 2019-11-06

## 2019-11-06 NOTE — TELEPHONE ENCOUNTER
Future Appointments       Provider Department Center    11/7/2019 11:40 AM Phoebe Lancaster M.D. [facility]    11/11/2019 5:40 PM RB MG 3 [facility] [address] [place] [address]        ESTABLISHED PATIENT PRE-VISIT PLANNING     Patient was NOT contacted to complete PVP.       1.  Reviewed notes from the last few office visits within the medical group: Yes    2.  If any orders were placed at last visit or intended to be done for this visit (i.e. 6 mos follow-up), do we have Results/Consult Notes?        •  Labs - Labs ordered, NOT completed. Patient advised to complete prior to next appointment.       •  Imaging - Imaging ordered, NOT completed. Patient advised to complete prior to next appointment. - Mammogram scheduled on 11/11/2019       •  Referrals - No referrals were ordered at last office visit.    3. Is this appointment scheduled as a Hospital Follow-Up? No    4.  Immunizations were updated in [employer] using WebIZ?: Yes       •  Web Iz Recommendations: FLU, MMR , TDAP and SHINGRIX (Shingles)    5.  Patient is due for the following Health Maintenance Topics:   Health Maintenance Due   Topic Date Due   • Annual Wellness Visit  1965   • HEPATITIS C SCREENING  1965   • IMM DTaP/Tdap/Td Vaccine (1 - Tdap) 03/11/1975   • COLON CANCER SCREENING ANNUAL FIT  03/11/2014   • IMM ZOSTER VACCINES (1 of 2) 03/11/2014   • MAMMOGRAM  12/05/2017     6. Orders for overdue Health Maintenance topics pended in Pre-Charting? NO    7.  AHA (MDX) form printed for Provider? YES    8.  Patient was NOT informed to arrive 15 min prior to their scheduled appointment and bring in their medication bottles.

## 2019-11-07 ENCOUNTER — OFFICE VISIT (OUTPATIENT)
Dept: MEDICAL GROUP | Facility: PHYSICIAN GROUP | Age: 54
End: 2019-11-07
Payer: MEDICARE

## 2019-11-07 VITALS
HEIGHT: 64 IN | BODY MASS INDEX: 33.29 KG/M2 | HEART RATE: 86 BPM | WEIGHT: 195 LBS | SYSTOLIC BLOOD PRESSURE: 122 MMHG | OXYGEN SATURATION: 95 % | TEMPERATURE: 98.6 F | DIASTOLIC BLOOD PRESSURE: 80 MMHG | RESPIRATION RATE: 18 BRPM

## 2019-11-07 DIAGNOSIS — M06.9 RHEUMATOID ARTHRITIS, INVOLVING UNSPECIFIED SITE, UNSPECIFIED RHEUMATOID FACTOR PRESENCE: ICD-10-CM

## 2019-11-07 DIAGNOSIS — F40.243 FEAR OF FLYING: ICD-10-CM

## 2019-11-07 DIAGNOSIS — R00.2 PALPITATIONS: ICD-10-CM

## 2019-11-07 LAB — EKG 4674: NORMAL

## 2019-11-07 PROCEDURE — 93000 ELECTROCARDIOGRAM COMPLETE: CPT | Performed by: FAMILY MEDICINE

## 2019-11-07 PROCEDURE — 99214 OFFICE O/P EST MOD 30 MIN: CPT | Performed by: FAMILY MEDICINE

## 2019-11-07 PROCEDURE — 8041 PR SCP AHA: Performed by: FAMILY MEDICINE

## 2019-11-07 RX ORDER — ALPRAZOLAM 0.5 MG/1
.25-.5 TABLET ORAL 2 TIMES DAILY PRN
Qty: 4 TAB | Refills: 0 | Status: SHIPPED | OUTPATIENT
Start: 2019-11-07 | End: 2019-12-07

## 2019-11-07 RX ORDER — LEVOTHYROXINE SODIUM 0.12 MG/1
125 TABLET ORAL
Qty: 30 TAB | Refills: 0 | Status: SHIPPED | OUTPATIENT
Start: 2019-11-07 | End: 2019-12-17

## 2019-11-07 NOTE — TELEPHONE ENCOUNTER
Pt was told to get thyroid labs done last month and it still hasn't been drawn. Will only send 1 month and next request will be denied.

## 2019-11-07 NOTE — PROGRESS NOTES
Chief Complaint   Patient presents with   • Palpitations       HISTORY OF PRESENT ILLNESS: Patient is a 54 y.o. female established patient here today for the following concerns:    Here with about 3 days of palpitations without chest pain.  She reports that they will last for a few seconds to minutes.  She also recalled that she was drinking a few cokes per day and typically does not drink them.  She reports had similar thing happen years ago when she drank mountain due.  She also reports stress on upcoming trip.  They are taking the whole family and pets to [place].  She is not sure how she will do on the plan with her anxiety.        In addition, reports that due to the RA, she has had her bilateral hip replacement and is doing very well on that front, but that the RA has destroyed her ankles right worse than left with talk of possibly fusing it now.  She continues on Xeljanz and Sulfadiazine through rheum.      Past Medical, Social, and Family history reviewed and updated in EPIC    Allergies:Patient has no known allergies.    Current Outpatient Medications   Medication Sig Dispense Refill   • levothyroxine (SYNTHROID) 125 MCG Tab Take 1 Tab by mouth Every morning on an empty stomach. 30 Tab 0   • PARoxetine (PAXIL) 10 MG Tab TAKE ONE TABLET BY MOUTH ONE TIME DAILY  90 Tab 1   • lisinopril-hydrochlorothiazide (PRINZIDE, ZESTORETIC) 10-12.5 MG per tablet TAKE 1 TABLET BY MOUTH ONCE DAILY 100 Tab 1   • SulfADIAZINE 500 MG Tab Take 500 mg by mouth 2 Times a Day. 2 pills in am and 2 pills in pm     • Nutritional Supplements (VITAMIN D BOOSTER PO) Take  by mouth.     • acetaminophen (TYLENOL) 500 MG Tab Take 1 Tab by mouth every 6 hours. 60 Tab 1   • meloxicam (MOBIC) 7.5 MG Tab Take 7.5 mg by mouth every day.     • XELJANZ 5 MG Tab Take 1 Tab by mouth 2 Times a Day.  1   • B Complex Vitamins (VITAMIN B COMPLEX) Tab Take  by mouth.     • folic acid (FOLVITE) 1 MG TABS Take 1 mg by mouth every day.       No current  "facility-administered medications for this visit.          ROS:  Review of Systems   Constitutional: Negative for fever, chills, weight loss and malaise/fatigue.   HENT: Negative for ear pain, nosebleeds, congestion, sore throat and neck pain.    Eyes: Negative for blurred vision.   Respiratory: Negative for cough, sputum production, shortness of breath and wheezing.    Cardiovascular: Negative for chest pain, palpitations,  and leg swelling.   Gastrointestinal: Negative for heartburn, nausea, vomiting, diarrhea and abdominal pain.   Genitourinary: Negative for dysuria, urgency and frequency.   Musculoskeletal: Negative for myalgias, back pain and joint pain.   Skin: Negative for rash and itching.   Neurological: Negative for dizziness, tingling, tremors, sensory change, focal weakness and headaches.   Endo/Heme/Allergies: Does not bruise/bleed easily.   Psychiatric/Behavioral: Negative for depression, anxiety, suicidal ideas, insomnia and memory loss.      Exam:  /80   Pulse 86   Temp 37 °C (98.6 °F)   Resp 18   Ht 1.626 m (5' 4\")   Wt 88.5 kg (195 lb)   SpO2 95%     General:  Well nourished, well developed in NAD  Head is grossly normal.  Neck: Supple without JVD   Pulmonary:  Normal effort. CTAB  Cardiovascular: Regular rate and rhythm no m/r/g  Extremities: no clubbing, cyanosis, or edema.  Psych: affect appropriate      Please note that this dictation was created using voice recognition software. I have made every reasonable attempt to correct obvious errors, but I expect that there are errors of grammar and possibly content that I did not discover before finalizing the note.    Assessment/Plan:    1. Fear of flying  - ALPRAZolam (XANAX) 0.5 MG Tab; Take 0.5-1 Tabs by mouth 2 times a day as needed for Anxiety (fear of flying) for up to 30 days.  Dispense: 4 Tab; Refill: 0    2. Palpitations  EKG today demonstrated normal sinus rhythm no ST/T wave changes, normal intervals.  No ectopy seen. "     Suspect palpitations due to PVCs due to caffeine and stress.  Seems to have settled.  Will continue with observation, stress reduction and avoidance of caffeine.     3. Rheumatoid arthritis, involving unspecified site, unspecified rheumatoid factor presence (HCC)  Continue current medications and follow up with rheumatology and ortho.      Annual Health Assessment Questions:    1.  Are you currently engaging in any exercise or physical activity? No    2.  How would you describe your mood or emotional well-being today? fair    3.  Have you had any falls in the last year? No    4.  Have you noticed any problems with your balance or had difficulty walking? Yes    5.  In the last six months have you experienced any leakage of urine? No    6. DPA/Advanced Directive: Patient does not have an Advanced Directive.  A packet and workshop information was given on Advanced Directives.

## 2019-12-02 ENCOUNTER — HOSPITAL ENCOUNTER (OUTPATIENT)
Dept: LAB | Facility: MEDICAL CENTER | Age: 54
End: 2019-12-02
Attending: FAMILY MEDICINE
Payer: MEDICARE

## 2019-12-02 ENCOUNTER — HOSPITAL ENCOUNTER (OUTPATIENT)
Dept: LAB | Facility: MEDICAL CENTER | Age: 54
End: 2019-12-02
Attending: SPECIALIST
Payer: MEDICARE

## 2019-12-02 DIAGNOSIS — E03.9 ACQUIRED HYPOTHYROIDISM: ICD-10-CM

## 2019-12-02 LAB
25(OH)D3 SERPL-MCNC: 26 NG/ML (ref 30–100)
ALBUMIN SERPL BCP-MCNC: 4.6 G/DL (ref 3.2–4.9)
ALP SERPL-CCNC: 91 U/L (ref 30–99)
ALT SERPL-CCNC: 26 U/L (ref 2–50)
AST SERPL-CCNC: 21 U/L (ref 12–45)
BASOPHILS # BLD AUTO: 0.7 % (ref 0–1.8)
BASOPHILS # BLD: 0.04 K/UL (ref 0–0.12)
BILIRUB CONJ SERPL-MCNC: <0.1 MG/DL (ref 0.1–0.5)
BILIRUB INDIRECT SERPL-MCNC: NORMAL MG/DL (ref 0–1)
BILIRUB SERPL-MCNC: 0.4 MG/DL (ref 0.1–1.5)
CREAT SERPL-MCNC: 0.93 MG/DL (ref 0.5–1.4)
EOSINOPHIL # BLD AUTO: 0.24 K/UL (ref 0–0.51)
EOSINOPHIL NFR BLD: 3.9 % (ref 0–6.9)
ERYTHROCYTE [DISTWIDTH] IN BLOOD BY AUTOMATED COUNT: 46.1 FL (ref 35.9–50)
HCT VFR BLD AUTO: 40.4 % (ref 37–47)
HGB BLD-MCNC: 13.2 G/DL (ref 12–16)
IMM GRANULOCYTES # BLD AUTO: 0.02 K/UL (ref 0–0.11)
IMM GRANULOCYTES NFR BLD AUTO: 0.3 % (ref 0–0.9)
LYMPHOCYTES # BLD AUTO: 0.93 K/UL (ref 1–4.8)
LYMPHOCYTES NFR BLD: 15.1 % (ref 22–41)
MCH RBC QN AUTO: 31.1 PG (ref 27–33)
MCHC RBC AUTO-ENTMCNC: 32.7 G/DL (ref 33.6–35)
MCV RBC AUTO: 95.3 FL (ref 81.4–97.8)
MONOCYTES # BLD AUTO: 0.43 K/UL (ref 0–0.85)
MONOCYTES NFR BLD AUTO: 7 % (ref 0–13.4)
NEUTROPHILS # BLD AUTO: 4.48 K/UL (ref 2–7.15)
NEUTROPHILS NFR BLD: 73 % (ref 44–72)
NRBC # BLD AUTO: 0 K/UL
NRBC BLD-RTO: 0 /100 WBC
PLATELET # BLD AUTO: 280 K/UL (ref 164–446)
PMV BLD AUTO: 11.4 FL (ref 9–12.9)
PROT SERPL-MCNC: 7.8 G/DL (ref 6–8.2)
RBC # BLD AUTO: 4.24 M/UL (ref 4.2–5.4)
TSH SERPL DL<=0.005 MIU/L-ACNC: 0.62 UIU/ML (ref 0.38–5.33)
WBC # BLD AUTO: 6.1 K/UL (ref 4.8–10.8)

## 2019-12-02 PROCEDURE — 36415 COLL VENOUS BLD VENIPUNCTURE: CPT

## 2019-12-02 PROCEDURE — 82565 ASSAY OF CREATININE: CPT

## 2019-12-02 PROCEDURE — 80076 HEPATIC FUNCTION PANEL: CPT

## 2019-12-02 PROCEDURE — 85025 COMPLETE CBC W/AUTO DIFF WBC: CPT

## 2019-12-02 PROCEDURE — 82306 VITAMIN D 25 HYDROXY: CPT

## 2019-12-02 PROCEDURE — 84443 ASSAY THYROID STIM HORMONE: CPT

## 2019-12-17 RX ORDER — LEVOTHYROXINE SODIUM 0.12 MG/1
TABLET ORAL
Qty: 90 TAB | Refills: 3 | Status: SHIPPED | OUTPATIENT
Start: 2019-12-17 | End: 2021-03-19

## 2020-01-03 ENCOUNTER — PATIENT MESSAGE (OUTPATIENT)
Dept: MEDICAL GROUP | Facility: PHYSICIAN GROUP | Age: 55
End: 2020-01-03

## 2020-01-03 ENCOUNTER — HOSPITAL ENCOUNTER (OUTPATIENT)
Dept: RADIOLOGY | Facility: MEDICAL CENTER | Age: 55
End: 2020-01-03
Attending: ORTHOPAEDIC SURGERY
Payer: MEDICARE

## 2020-01-03 DIAGNOSIS — M13.871 ALLERGIC ARTHRITIS OF ANKLE, RIGHT: ICD-10-CM

## 2020-01-03 PROCEDURE — 73700 CT LOWER EXTREMITY W/O DYE: CPT | Mod: RT

## 2020-01-03 NOTE — PATIENT COMMUNICATION
Please call the patient her my chart message is incoherent.  I think she may need to go in to ED for evaluation urgently if she is that altered.

## 2020-03-04 ENCOUNTER — OFFICE VISIT (OUTPATIENT)
Dept: MEDICAL GROUP | Facility: PHYSICIAN GROUP | Age: 55
End: 2020-03-04
Payer: MEDICARE

## 2020-03-04 ENCOUNTER — PATIENT MESSAGE (OUTPATIENT)
Dept: MEDICAL GROUP | Facility: PHYSICIAN GROUP | Age: 55
End: 2020-03-04

## 2020-03-04 ENCOUNTER — HOSPITAL ENCOUNTER (OUTPATIENT)
Dept: LAB | Facility: MEDICAL CENTER | Age: 55
End: 2020-03-04
Attending: SPECIALIST
Payer: MEDICARE

## 2020-03-04 ENCOUNTER — HOSPITAL ENCOUNTER (OUTPATIENT)
Dept: RADIOLOGY | Facility: MEDICAL CENTER | Age: 55
End: 2020-03-04
Attending: FAMILY MEDICINE
Payer: MEDICARE

## 2020-03-04 VITALS
HEART RATE: 80 BPM | HEIGHT: 64 IN | SYSTOLIC BLOOD PRESSURE: 142 MMHG | BODY MASS INDEX: 33.97 KG/M2 | RESPIRATION RATE: 16 BRPM | TEMPERATURE: 98.6 F | DIASTOLIC BLOOD PRESSURE: 92 MMHG | WEIGHT: 199 LBS | OXYGEN SATURATION: 93 %

## 2020-03-04 DIAGNOSIS — M79.672 LEFT FOOT PAIN: ICD-10-CM

## 2020-03-04 LAB
ALBUMIN SERPL BCP-MCNC: 4.2 G/DL (ref 3.2–4.9)
ALP SERPL-CCNC: 90 U/L (ref 30–99)
ALT SERPL-CCNC: 24 U/L (ref 2–50)
AST SERPL-CCNC: 18 U/L (ref 12–45)
BASOPHILS # BLD AUTO: 0.6 % (ref 0–1.8)
BASOPHILS # BLD: 0.05 K/UL (ref 0–0.12)
BILIRUB CONJ SERPL-MCNC: 0.1 MG/DL (ref 0.1–0.5)
BILIRUB INDIRECT SERPL-MCNC: 0.6 MG/DL (ref 0–1)
BILIRUB SERPL-MCNC: 0.7 MG/DL (ref 0.1–1.5)
EOSINOPHIL # BLD AUTO: 0.34 K/UL (ref 0–0.51)
EOSINOPHIL NFR BLD: 4.1 % (ref 0–6.9)
ERYTHROCYTE [DISTWIDTH] IN BLOOD BY AUTOMATED COUNT: 42.1 FL (ref 35.9–50)
HCT VFR BLD AUTO: 41.2 % (ref 37–47)
HGB BLD-MCNC: 13.6 G/DL (ref 12–16)
IMM GRANULOCYTES # BLD AUTO: 0.04 K/UL (ref 0–0.11)
IMM GRANULOCYTES NFR BLD AUTO: 0.5 % (ref 0–0.9)
LYMPHOCYTES # BLD AUTO: 1.05 K/UL (ref 1–4.8)
LYMPHOCYTES NFR BLD: 12.8 % (ref 22–41)
MCH RBC QN AUTO: 30.2 PG (ref 27–33)
MCHC RBC AUTO-ENTMCNC: 33 G/DL (ref 33.6–35)
MCV RBC AUTO: 91.4 FL (ref 81.4–97.8)
MONOCYTES # BLD AUTO: 0.64 K/UL (ref 0–0.85)
MONOCYTES NFR BLD AUTO: 7.8 % (ref 0–13.4)
NEUTROPHILS # BLD AUTO: 6.09 K/UL (ref 2–7.15)
NEUTROPHILS NFR BLD: 74.2 % (ref 44–72)
NRBC # BLD AUTO: 0 K/UL
NRBC BLD-RTO: 0 /100 WBC
PLATELET # BLD AUTO: 273 K/UL (ref 164–446)
PMV BLD AUTO: 11.1 FL (ref 9–12.9)
PROT SERPL-MCNC: 7.5 G/DL (ref 6–8.2)
RBC # BLD AUTO: 4.51 M/UL (ref 4.2–5.4)
WBC # BLD AUTO: 8.2 K/UL (ref 4.8–10.8)

## 2020-03-04 PROCEDURE — 36415 COLL VENOUS BLD VENIPUNCTURE: CPT

## 2020-03-04 PROCEDURE — 80076 HEPATIC FUNCTION PANEL: CPT

## 2020-03-04 PROCEDURE — 73630 X-RAY EXAM OF FOOT: CPT | Mod: LT

## 2020-03-04 PROCEDURE — 99214 OFFICE O/P EST MOD 30 MIN: CPT | Performed by: FAMILY MEDICINE

## 2020-03-04 PROCEDURE — 85025 COMPLETE CBC W/AUTO DIFF WBC: CPT

## 2020-03-04 ASSESSMENT — PATIENT HEALTH QUESTIONNAIRE - PHQ9: CLINICAL INTERPRETATION OF PHQ2 SCORE: 0

## 2020-03-04 ASSESSMENT — FIBROSIS 4 INDEX: FIB4 SCORE: 0.81

## 2020-03-04 NOTE — PROGRESS NOTES
"Chief Complaint   Patient presents with   • Foot Injury     lft foot injury 1 day ago       HISTORY OF PRESENT ILLNESS: Patient is a 54 y.o. female established patient here today for the following concerns:    1. Left foot pain  This is a pleasant 54-year-old female with history of rheumatoid arthritis who comes in today after yesterday accidentally running her own left foot over with her scooter.  She reports that it immediately was painful, swelling and tender to the touch.  She is barely able to put pressure on it to walk.  She reports \"it feels worse than my RA\".  Unfortunately she is also looking at having surgery on her right foot for degenerative changes due to the osteoarthritis, fusing some of the areas there.  She wants to be sure that her left foot is going to be okay before she undergoes surgery on the right.  No other injuries were sustained    Past Medical, Social, and Family history reviewed and updated in EPIC    Allergies:Patient has no known allergies.    Current Outpatient Medications   Medication Sig Dispense Refill   • levothyroxine (SYNTHROID) 125 MCG Tab TAKE ONE TABLET BY MOUTH ONCE DAILY on an empty stomach 90 Tab 3   • PARoxetine (PAXIL) 10 MG Tab TAKE ONE TABLET BY MOUTH ONE TIME DAILY  90 Tab 1   • lisinopril-hydrochlorothiazide (PRINZIDE, ZESTORETIC) 10-12.5 MG per tablet TAKE 1 TABLET BY MOUTH ONCE DAILY 100 Tab 1   • SulfADIAZINE 500 MG Tab Take 500 mg by mouth 2 Times a Day. 2 pills in am and 2 pills in pm     • Nutritional Supplements (VITAMIN D BOOSTER PO) Take  by mouth.     • acetaminophen (TYLENOL) 500 MG Tab Take 1 Tab by mouth every 6 hours. 60 Tab 1   • meloxicam (MOBIC) 7.5 MG Tab Take 7.5 mg by mouth every day.     • XELJANZ 5 MG Tab Take 1 Tab by mouth 2 Times a Day.  1   • B Complex Vitamins (VITAMIN B COMPLEX) Tab Take  by mouth.     • folic acid (FOLVITE) 1 MG TABS Take 1 mg by mouth every day.       No current facility-administered medications for this visit.  " "        ROS:  Review of Systems   Constitutional: Negative for fever, chills, weight loss and malaise/fatigue.   HENT: Negative for ear pain, nosebleeds, congestion, sore throat and neck pain.    Eyes: Negative for blurred vision.   Respiratory: Negative for cough, sputum production, shortness of breath and wheezing.    Cardiovascular: Negative for chest pain, palpitations,  and leg swelling.   Gastrointestinal: Negative for heartburn, nausea, vomiting, diarrhea and abdominal pain.   Genitourinary: Negative for dysuria, urgency and frequency.   Musculoskeletal: Negative for myalgias, back pain and positive joint pain.   Skin: Negative for rash and itching.   Neurological: Negative for dizziness, tingling, tremors, sensory change, focal weakness and headaches.   Endo/Heme/Allergies: Does not bruise/bleed easily.   Psychiatric/Behavioral: Negative for depression, anxiety, suicidal ideas, insomnia and memory loss.      Exam:  /92   Pulse 80   Temp 37 °C (98.6 °F)   Resp 16   Ht 1.626 m (5' 4\")   Wt 90.3 kg (199 lb)   SpO2 93%     General:  Well nourished, well developed in NAD  Head is grossly normal.  Neck: Supple without JVD   Pulmonary:  Normal effort.   Cardiovascular: Regular rate  Extremities: no clubbing, cyanosis, or edema.  Psych: affect appropriate  MSK: Left dorsum of the foot with minimal bruising tenderness over the midfoot and metatarsals medially, no deformities noted      Please note that this dictation was created using voice recognition software. I have made every reasonable attempt to correct obvious errors, but I expect that there are errors of grammar and possibly content that I did not discover before finalizing the note.    Assessment/Plan:  1. Left foot pain  Rule out fracture versus contusion.  Rest, ice elevate nonsteroidal anti-inflammatory use such as meloxicam  - DX-FOOT-COMPLETE 3+ LEFT; Future            "

## 2020-03-23 RX ORDER — LISINOPRIL AND HYDROCHLOROTHIAZIDE 12.5; 1 MG/1; MG/1
TABLET ORAL
Qty: 100 TAB | Refills: 1 | Status: SHIPPED | OUTPATIENT
Start: 2020-03-23 | End: 2020-07-01

## 2020-03-23 NOTE — TELEPHONE ENCOUNTER
Refill X 6 months, sent to pharmacy.Pt. Seen in the last 6 months per protocol.   Lab Results   Component Value Date/Time    SODIUM 139 06/06/2019 10:44 AM    POTASSIUM 3.6 06/06/2019 10:44 AM    CHLORIDE 103 06/06/2019 10:44 AM    CO2 26 06/06/2019 10:44 AM    GLUCOSE 108 (H) 06/06/2019 10:44 AM    BUN 15 06/06/2019 10:44 AM    CREATININE 0.93 12/02/2019 02:09 PM       Daryl Ely, PharmD, BCACP

## 2020-04-18 DIAGNOSIS — N95.9 MENOPAUSAL DISORDER: ICD-10-CM

## 2020-04-20 RX ORDER — PAROXETINE 10 MG/1
TABLET, FILM COATED ORAL
Qty: 90 TAB | Refills: 1 | Status: SHIPPED | OUTPATIENT
Start: 2020-04-20 | End: 2020-11-03

## 2020-06-03 ENCOUNTER — HOSPITAL ENCOUNTER (OUTPATIENT)
Dept: LAB | Facility: MEDICAL CENTER | Age: 55
End: 2020-06-03
Attending: SPECIALIST
Payer: MEDICARE

## 2020-06-03 LAB
25(OH)D3 SERPL-MCNC: 34 NG/ML (ref 30–100)
ALBUMIN SERPL BCP-MCNC: 4.4 G/DL (ref 3.2–4.9)
ALP SERPL-CCNC: 86 U/L (ref 30–99)
ALT SERPL-CCNC: 30 U/L (ref 2–50)
AST SERPL-CCNC: 22 U/L (ref 12–45)
BASOPHILS # BLD AUTO: 0.7 % (ref 0–1.8)
BASOPHILS # BLD: 0.03 K/UL (ref 0–0.12)
BILIRUB CONJ SERPL-MCNC: <0.2 MG/DL (ref 0.1–0.5)
BILIRUB INDIRECT SERPL-MCNC: NORMAL MG/DL (ref 0–1)
BILIRUB SERPL-MCNC: 0.5 MG/DL (ref 0.1–1.5)
CREAT SERPL-MCNC: 0.91 MG/DL (ref 0.5–1.4)
EOSINOPHIL # BLD AUTO: 0.23 K/UL (ref 0–0.51)
EOSINOPHIL NFR BLD: 5.4 % (ref 0–6.9)
ERYTHROCYTE [DISTWIDTH] IN BLOOD BY AUTOMATED COUNT: 45.7 FL (ref 35.9–50)
HCT VFR BLD AUTO: 41.1 % (ref 37–47)
HGB BLD-MCNC: 13.6 G/DL (ref 12–16)
IMM GRANULOCYTES # BLD AUTO: 0.02 K/UL (ref 0–0.11)
IMM GRANULOCYTES NFR BLD AUTO: 0.5 % (ref 0–0.9)
LYMPHOCYTES # BLD AUTO: 0.89 K/UL (ref 1–4.8)
LYMPHOCYTES NFR BLD: 20.9 % (ref 22–41)
MCH RBC QN AUTO: 31.6 PG (ref 27–33)
MCHC RBC AUTO-ENTMCNC: 33.1 G/DL (ref 33.6–35)
MCV RBC AUTO: 95.6 FL (ref 81.4–97.8)
MONOCYTES # BLD AUTO: 0.39 K/UL (ref 0–0.85)
MONOCYTES NFR BLD AUTO: 9.2 % (ref 0–13.4)
NEUTROPHILS # BLD AUTO: 2.69 K/UL (ref 2–7.15)
NEUTROPHILS NFR BLD: 63.3 % (ref 44–72)
NRBC # BLD AUTO: 0 K/UL
NRBC BLD-RTO: 0 /100 WBC
PLATELET # BLD AUTO: 233 K/UL (ref 164–446)
PMV BLD AUTO: 11 FL (ref 9–12.9)
PROT SERPL-MCNC: 7.2 G/DL (ref 6–8.2)
RBC # BLD AUTO: 4.3 M/UL (ref 4.2–5.4)
WBC # BLD AUTO: 4.3 K/UL (ref 4.8–10.8)

## 2020-06-03 PROCEDURE — 36415 COLL VENOUS BLD VENIPUNCTURE: CPT

## 2020-06-03 PROCEDURE — 82565 ASSAY OF CREATININE: CPT

## 2020-06-03 PROCEDURE — 80076 HEPATIC FUNCTION PANEL: CPT

## 2020-06-03 PROCEDURE — 82306 VITAMIN D 25 HYDROXY: CPT

## 2020-06-03 PROCEDURE — 85025 COMPLETE CBC W/AUTO DIFF WBC: CPT

## 2020-06-06 ENCOUNTER — HOSPITAL ENCOUNTER (OUTPATIENT)
Dept: RADIOLOGY | Facility: MEDICAL CENTER | Age: 55
End: 2020-06-06
Attending: FAMILY MEDICINE
Payer: MEDICARE

## 2020-06-06 DIAGNOSIS — Z12.31 VISIT FOR SCREENING MAMMOGRAM: ICD-10-CM

## 2020-06-06 PROCEDURE — 77067 SCR MAMMO BI INCL CAD: CPT

## 2020-06-11 ENCOUNTER — OCCUPATIONAL MEDICINE (OUTPATIENT)
Dept: URGENT CARE | Facility: PHYSICIAN GROUP | Age: 55
End: 2020-06-11
Payer: COMMERCIAL

## 2020-06-11 ENCOUNTER — HOSPITAL ENCOUNTER (OUTPATIENT)
Dept: RADIOLOGY | Facility: MEDICAL CENTER | Age: 55
End: 2020-06-11
Attending: FAMILY MEDICINE
Payer: MEDICARE

## 2020-06-11 VITALS
HEART RATE: 56 BPM | TEMPERATURE: 97.1 F | WEIGHT: 197 LBS | OXYGEN SATURATION: 99 % | DIASTOLIC BLOOD PRESSURE: 82 MMHG | BODY MASS INDEX: 33.63 KG/M2 | SYSTOLIC BLOOD PRESSURE: 162 MMHG | RESPIRATION RATE: 14 BRPM | HEIGHT: 64 IN

## 2020-06-11 DIAGNOSIS — S39.011A STRAIN OF ABDOMINAL MUSCLE, INITIAL ENCOUNTER: ICD-10-CM

## 2020-06-11 DIAGNOSIS — S80.212A ABRASION OF LEFT KNEE, INITIAL ENCOUNTER: ICD-10-CM

## 2020-06-11 DIAGNOSIS — S76.212A INGUINAL STRAIN, LEFT, INITIAL ENCOUNTER: ICD-10-CM

## 2020-06-11 DIAGNOSIS — S43.401A SPRAIN OF RIGHT SHOULDER, UNSPECIFIED SHOULDER SPRAIN TYPE, INITIAL ENCOUNTER: ICD-10-CM

## 2020-06-11 PROCEDURE — 90715 TDAP VACCINE 7 YRS/> IM: CPT | Performed by: FAMILY MEDICINE

## 2020-06-11 PROCEDURE — 73502 X-RAY EXAM HIP UNI 2-3 VIEWS: CPT | Mod: LT

## 2020-06-11 PROCEDURE — 99214 OFFICE O/P EST MOD 30 MIN: CPT | Mod: 25 | Performed by: FAMILY MEDICINE

## 2020-06-11 PROCEDURE — 90471 IMMUNIZATION ADMIN: CPT | Performed by: FAMILY MEDICINE

## 2020-06-11 ASSESSMENT — ENCOUNTER SYMPTOMS
MYALGIAS: 1
FALLS: 1

## 2020-06-11 ASSESSMENT — FIBROSIS 4 INDEX: FIB4 SCORE: 0.97
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
## 2020-06-16 ENCOUNTER — OCCUPATIONAL MEDICINE (OUTPATIENT)
Dept: URGENT CARE | Facility: PHYSICIAN GROUP | Age: 55
End: 2020-06-16
Payer: COMMERCIAL

## 2020-06-16 VITALS
WEIGHT: 197 LBS | SYSTOLIC BLOOD PRESSURE: 142 MMHG | BODY MASS INDEX: 33.63 KG/M2 | HEIGHT: 64 IN | RESPIRATION RATE: 18 BRPM | TEMPERATURE: 96.6 F | DIASTOLIC BLOOD PRESSURE: 84 MMHG | OXYGEN SATURATION: 94 % | HEART RATE: 70 BPM

## 2020-06-16 DIAGNOSIS — S43.401D SPRAIN OF RIGHT SHOULDER, UNSPECIFIED SHOULDER SPRAIN TYPE, SUBSEQUENT ENCOUNTER: ICD-10-CM

## 2020-06-16 DIAGNOSIS — S80.212D ABRASION, LEFT KNEE, SUBSEQUENT ENCOUNTER: ICD-10-CM

## 2020-06-16 DIAGNOSIS — S39.011D STRAIN OF ABDOMINAL MUSCLE, SUBSEQUENT ENCOUNTER: ICD-10-CM

## 2020-06-16 PROCEDURE — 99213 OFFICE O/P EST LOW 20 MIN: CPT | Performed by: NURSE PRACTITIONER

## 2020-06-16 ASSESSMENT — FIBROSIS 4 INDEX: FIB4 SCORE: 0.97

## 2020-06-16 ASSESSMENT — ENCOUNTER SYMPTOMS: MYALGIAS: 0
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
## 2020-06-26 ENCOUNTER — OCCUPATIONAL MEDICINE (OUTPATIENT)
Dept: URGENT CARE | Facility: PHYSICIAN GROUP | Age: 55
End: 2020-06-26
Payer: COMMERCIAL

## 2020-06-26 VITALS
TEMPERATURE: 98.1 F | HEART RATE: 77 BPM | BODY MASS INDEX: 33.63 KG/M2 | DIASTOLIC BLOOD PRESSURE: 80 MMHG | RESPIRATION RATE: 15 BRPM | SYSTOLIC BLOOD PRESSURE: 122 MMHG | OXYGEN SATURATION: 94 % | HEIGHT: 64 IN | WEIGHT: 197 LBS

## 2020-06-26 DIAGNOSIS — S43.401D SPRAIN OF RIGHT SHOULDER, UNSPECIFIED SHOULDER SPRAIN TYPE, SUBSEQUENT ENCOUNTER: ICD-10-CM

## 2020-06-26 PROCEDURE — 99213 OFFICE O/P EST LOW 20 MIN: CPT | Performed by: NURSE PRACTITIONER

## 2020-06-26 ASSESSMENT — ENCOUNTER SYMPTOMS
MYALGIAS: 1
NECK PAIN: 1

## 2020-06-26 ASSESSMENT — PAIN SCALES - GENERAL: PAINLEVEL: 7=MODERATE-SEVERE PAIN

## 2020-06-26 ASSESSMENT — FIBROSIS 4 INDEX: FIB4 SCORE: 0.97
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
## 2020-07-01 ENCOUNTER — TELEMEDICINE (OUTPATIENT)
Dept: MEDICAL GROUP | Facility: PHYSICIAN GROUP | Age: 55
End: 2020-07-01
Payer: COMMERCIAL

## 2020-07-01 VITALS — DIASTOLIC BLOOD PRESSURE: 70 MMHG | SYSTOLIC BLOOD PRESSURE: 136 MMHG

## 2020-07-01 DIAGNOSIS — M06.9 RHEUMATOID ARTHRITIS, INVOLVING UNSPECIFIED SITE, UNSPECIFIED RHEUMATOID FACTOR PRESENCE: ICD-10-CM

## 2020-07-01 DIAGNOSIS — M25.511 CHRONIC RIGHT SHOULDER PAIN: ICD-10-CM

## 2020-07-01 DIAGNOSIS — I10 ESSENTIAL HYPERTENSION: ICD-10-CM

## 2020-07-01 DIAGNOSIS — G89.29 CHRONIC RIGHT SHOULDER PAIN: ICD-10-CM

## 2020-07-01 PROCEDURE — 99214 OFFICE O/P EST MOD 30 MIN: CPT | Mod: 95,CR | Performed by: FAMILY MEDICINE

## 2020-07-01 RX ORDER — SULFASALAZINE 500 MG/1
TABLET ORAL
COMMUNITY
Start: 2020-06-23 | End: 2020-07-01

## 2020-07-01 RX ORDER — LISINOPRIL AND HYDROCHLOROTHIAZIDE 20; 12.5 MG/1; MG/1
1 TABLET ORAL DAILY
Qty: 90 TAB | Refills: 3 | Status: SHIPPED | OUTPATIENT
Start: 2020-07-01 | End: 2021-10-13 | Stop reason: SDUPTHER
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
## 2020-07-06 ENCOUNTER — TELEPHONE (OUTPATIENT)
Dept: OCCUPATIONAL MEDICINE | Facility: CLINIC | Age: 55
End: 2020-07-06

## 2020-07-14 ENCOUNTER — PATIENT MESSAGE (OUTPATIENT)
Dept: MEDICAL GROUP | Facility: PHYSICIAN GROUP | Age: 55
End: 2020-07-14

## 2020-07-14 DIAGNOSIS — Z20.822 CLOSE EXPOSURE TO COVID-19 VIRUS: ICD-10-CM

## 2020-08-26 ENCOUNTER — OFFICE VISIT (OUTPATIENT)
Dept: MEDICAL GROUP | Facility: PHYSICIAN GROUP | Age: 55
End: 2020-08-26
Payer: MEDICARE

## 2020-08-26 VITALS
BODY MASS INDEX: 33.26 KG/M2 | OXYGEN SATURATION: 95 % | DIASTOLIC BLOOD PRESSURE: 78 MMHG | HEIGHT: 64 IN | RESPIRATION RATE: 16 BRPM | WEIGHT: 194.8 LBS | TEMPERATURE: 98.3 F | HEART RATE: 76 BPM | SYSTOLIC BLOOD PRESSURE: 130 MMHG

## 2020-08-26 DIAGNOSIS — I10 ESSENTIAL HYPERTENSION: ICD-10-CM

## 2020-08-26 DIAGNOSIS — M54.16 LUMBAR RADICULOPATHY, ACUTE: ICD-10-CM

## 2020-08-26 PROCEDURE — 99214 OFFICE O/P EST MOD 30 MIN: CPT | Performed by: FAMILY MEDICINE

## 2020-08-26 RX ORDER — TRAMADOL HYDROCHLORIDE 50 MG/1
50 TABLET ORAL EVERY 4 HOURS PRN
Qty: 28 TAB | Refills: 0 | Status: SHIPPED | OUTPATIENT
Start: 2020-08-26 | End: 2020-09-02

## 2020-08-26 RX ORDER — METHYLPREDNISOLONE 4 MG/1
TABLET ORAL
Qty: 21 TAB | Refills: 0 | Status: SHIPPED
Start: 2020-08-26 | End: 2021-01-04

## 2020-08-26 ASSESSMENT — FIBROSIS 4 INDEX: FIB4 SCORE: 0.97

## 2020-08-26 NOTE — PROGRESS NOTES
Chief Complaint   Patient presents with   • Hypertension Follow-up   • Back Pain       HISTORY OF PRESENT ILLNESS: Patient is a 55 y.o. female established patient here today for the following concerns:    1. Lumbar radiculopathy, acute  Here for a few days of right sided low back pain after doing a lot of yard work.  Pain is aching and radiates into the right groin, right knee.  No leg weakness.  Better with walking.  Has used NSAIDs without a lot of improvement.  No bowel or bladder symptoms.      2. Hypertension  Has been tolerating the lisinopril-HCTZ well without side effects. Home BP are coming down into 120-130/70-90s.       Past Medical, Social, and Family history reviewed and updated in EPIC    Allergies:Patient has no known allergies.    Current Outpatient Medications   Medication Sig Dispense Refill   • methylPREDNISolone (MEDROL DOSEPAK) 4 MG Tablet Therapy Pack As directed on the packaging label. 21 Tab 0   • tramadol (ULTRAM) 50 MG Tab Take 1 Tab by mouth every four hours as needed for Moderate Pain or Severe Pain for up to 7 days. 28 Tab 0   • lisinopril-hydrochlorothiazide (PRINZIDE) 20-12.5 MG per tablet Take 1 Tab by mouth every day. 90 Tab 3   • PARoxetine (PAXIL) 10 MG Tab TAKE ONE TABLET BY MOUTH ONCE DAILY 90 Tab 1   • levothyroxine (SYNTHROID) 125 MCG Tab TAKE ONE TABLET BY MOUTH ONCE DAILY on an empty stomach 90 Tab 3   • SulfADIAZINE 500 MG Tab Take 1,000 mg by mouth 2 Times a Day. 2 pills in am and 2 pills in pm      • Nutritional Supplements (VITAMIN D BOOSTER PO) Take  by mouth.     • acetaminophen (TYLENOL) 500 MG Tab Take 1 Tab by mouth every 6 hours. 60 Tab 1   • meloxicam (MOBIC) 7.5 MG Tab Take 7.5 mg by mouth every day.     • XELJANZ 5 MG Tab Take 1 Tab by mouth 2 Times a Day.  1   • B Complex Vitamins (VITAMIN B COMPLEX) Tab Take  by mouth.     • folic acid (FOLVITE) 1 MG TABS Take 1 mg by mouth every day.       No current facility-administered medications for this visit.   "        ROS:  Review of Systems   Constitutional: Negative for fever, chills, weight loss and malaise/fatigue.   HENT: Negative for ear pain, nosebleeds, congestion, sore throat and neck pain.    Eyes: Negative for blurred vision.   Respiratory: Negative for cough, sputum production, shortness of breath and wheezing.    Cardiovascular: Negative for chest pain, palpitations,  and leg swelling.   Gastrointestinal: Negative for heartburn, nausea, vomiting, diarrhea and abdominal pain.   Genitourinary: Negative for dysuria, urgency and frequency.   Musculoskeletal: Negative for myalgias, back pain and joint pain.   Skin: Negative for rash and itching.   Neurological: Negative for dizziness, tingling, tremors, sensory change, focal weakness and headaches.   Endo/Heme/Allergies: Does not bruise/bleed easily.   Psychiatric/Behavioral: Negative for depression, anxiety, suicidal ideas, insomnia and memory loss.      Exam:  /78   Pulse 76   Temp 36.8 °C (98.3 °F)   Resp 16   Ht 1.626 m (5' 4\")   Wt 88.4 kg (194 lb 12.8 oz)   SpO2 95%     General:  Well nourished, well developed in NAD  Head is grossly normal.  Neck: Supple without JVD   Pulmonary:  Normal effort.   Cardiovascular: Regular rate  Extremities: no clubbing, cyanosis, or edema.  Psych: affect appropriate      Please note that this dictation was created using voice recognition software. I have made every reasonable attempt to correct obvious errors, but I expect that there are errors of grammar and possibly content that I did not discover before finalizing the note.    Assessment/Plan:  1. Lumbar radiculopathy, acute  Hold NSAIDs.  Start   - methylPREDNISolone (MEDROL DOSEPAK) 4 MG Tablet Therapy Pack; As directed on the packaging label.  Dispense: 21 Tab; Refill: 0  - tramadol (ULTRAM) 50 MG Tab; Take 1 Tab by mouth every four hours as needed for Moderate Pain or Severe Pain for up to 7 days.  Dispense: 28 Tab; Refill: 0  - Consent for Opiate " Prescription    If not improving in the next few weeks we will consider PT and neuroleptics     2. Essential hypertension  Continue current meds.      Annual Health Assessment Questions:    1.  Are you currently engaging in any exercise or physical activity? Yes    2.  How would you describe your mood or emotional well-being today? pain    3.  Have you had any falls in the last year? Yes    4.  Have you noticed any problems with your balance or had difficulty walking? No    5.  In the last six months have you experienced any leakage of urine? No    6. DPA/Advanced Directive: Patient has Advanced Directive on file.

## 2020-10-02 ENCOUNTER — HOSPITAL ENCOUNTER (OUTPATIENT)
Dept: LAB | Facility: MEDICAL CENTER | Age: 55
End: 2020-10-02
Attending: SPECIALIST
Payer: MEDICARE

## 2020-10-02 LAB
ALBUMIN SERPL BCP-MCNC: 4.3 G/DL (ref 3.2–4.9)
ALBUMIN/GLOB SERPL: 1.5 G/DL
ALP SERPL-CCNC: 93 U/L (ref 30–99)
ALT SERPL-CCNC: 32 U/L (ref 2–50)
ANION GAP SERPL CALC-SCNC: 12 MMOL/L (ref 7–16)
AST SERPL-CCNC: 26 U/L (ref 12–45)
BASOPHILS # BLD AUTO: 0.4 % (ref 0–1.8)
BASOPHILS # BLD: 0.02 K/UL (ref 0–0.12)
BILIRUB SERPL-MCNC: 0.6 MG/DL (ref 0.1–1.5)
BUN SERPL-MCNC: 18 MG/DL (ref 8–22)
CALCIUM SERPL-MCNC: 9.4 MG/DL (ref 8.5–10.5)
CHLORIDE SERPL-SCNC: 101 MMOL/L (ref 96–112)
CHOLEST SERPL-MCNC: 223 MG/DL (ref 100–199)
CO2 SERPL-SCNC: 25 MMOL/L (ref 20–33)
CREAT SERPL-MCNC: 0.8 MG/DL (ref 0.5–1.4)
EOSINOPHIL # BLD AUTO: 0.23 K/UL (ref 0–0.51)
EOSINOPHIL NFR BLD: 5.1 % (ref 0–6.9)
ERYTHROCYTE [DISTWIDTH] IN BLOOD BY AUTOMATED COUNT: 48.3 FL (ref 35.9–50)
FASTING STATUS PATIENT QL REPORTED: NORMAL
GLOBULIN SER CALC-MCNC: 2.9 G/DL (ref 1.9–3.5)
GLUCOSE SERPL-MCNC: 103 MG/DL (ref 65–99)
HCT VFR BLD AUTO: 40.7 % (ref 37–47)
HDLC SERPL-MCNC: 98 MG/DL
HGB BLD-MCNC: 13.5 G/DL (ref 12–16)
IMM GRANULOCYTES # BLD AUTO: 0.02 K/UL (ref 0–0.11)
IMM GRANULOCYTES NFR BLD AUTO: 0.4 % (ref 0–0.9)
LDLC SERPL CALC-MCNC: 109 MG/DL
LYMPHOCYTES # BLD AUTO: 0.92 K/UL (ref 1–4.8)
LYMPHOCYTES NFR BLD: 20.3 % (ref 22–41)
MCH RBC QN AUTO: 32.1 PG (ref 27–33)
MCHC RBC AUTO-ENTMCNC: 33.2 G/DL (ref 33.6–35)
MCV RBC AUTO: 96.9 FL (ref 81.4–97.8)
MONOCYTES # BLD AUTO: 0.48 K/UL (ref 0–0.85)
MONOCYTES NFR BLD AUTO: 10.6 % (ref 0–13.4)
NEUTROPHILS # BLD AUTO: 2.86 K/UL (ref 2–7.15)
NEUTROPHILS NFR BLD: 63.2 % (ref 44–72)
NRBC # BLD AUTO: 0 K/UL
NRBC BLD-RTO: 0 /100 WBC
PLATELET # BLD AUTO: 245 K/UL (ref 164–446)
PMV BLD AUTO: 11.5 FL (ref 9–12.9)
POTASSIUM SERPL-SCNC: 4.4 MMOL/L (ref 3.6–5.5)
PROT SERPL-MCNC: 7.2 G/DL (ref 6–8.2)
RBC # BLD AUTO: 4.2 M/UL (ref 4.2–5.4)
SODIUM SERPL-SCNC: 138 MMOL/L (ref 135–145)
TRIGL SERPL-MCNC: 78 MG/DL (ref 0–149)
WBC # BLD AUTO: 4.5 K/UL (ref 4.8–10.8)

## 2020-10-02 PROCEDURE — 80053 COMPREHEN METABOLIC PANEL: CPT

## 2020-10-02 PROCEDURE — 36415 COLL VENOUS BLD VENIPUNCTURE: CPT

## 2020-10-02 PROCEDURE — 85025 COMPLETE CBC W/AUTO DIFF WBC: CPT

## 2020-10-02 PROCEDURE — 80061 LIPID PANEL: CPT

## 2020-11-01 DIAGNOSIS — N95.9 MENOPAUSAL DISORDER: ICD-10-CM

## 2020-11-03 RX ORDER — PAROXETINE 10 MG/1
TABLET, FILM COATED ORAL
Qty: 90 TAB | Refills: 1 | Status: SHIPPED | OUTPATIENT
Start: 2020-11-03 | End: 2021-05-11 | Stop reason: SDUPTHER

## 2020-12-07 ENCOUNTER — TELEMEDICINE (OUTPATIENT)
Dept: MEDICAL GROUP | Facility: PHYSICIAN GROUP | Age: 55
End: 2020-12-07
Payer: MEDICARE

## 2020-12-07 DIAGNOSIS — E03.8 OTHER SPECIFIED HYPOTHYROIDISM: ICD-10-CM

## 2020-12-07 PROCEDURE — 99213 OFFICE O/P EST LOW 20 MIN: CPT | Performed by: INTERNAL MEDICINE

## 2020-12-07 RX ORDER — MELOXICAM 15 MG/1
TABLET ORAL
COMMUNITY
Start: 2020-12-01

## 2020-12-07 RX ORDER — LEVOTHYROXINE SODIUM 0.12 MG/1
125 TABLET ORAL
Qty: 90 TAB | Refills: 0 | Status: SHIPPED
Start: 2020-12-07 | End: 2021-01-04

## 2020-12-07 RX ORDER — SULFASALAZINE 500 MG/1
TABLET, DELAYED RELEASE ORAL
COMMUNITY
Start: 2020-10-14 | End: 2022-07-17

## 2020-12-07 NOTE — PROGRESS NOTES
Virtual Visit: Established Patient   This visit was conducted via Zoom using secure and encrypted videoconferencing technology. The patient was in a private location in the state of [place].    The patient's identity was confirmed and verbal consent was obtained for this virtual visit.    Subjective:   CC:   Chief Complaint   Patient presents with   • Establish Care   • Medication Refill       Abdias Viera is a 55 y.o. female presenting for evaluation and management of:    1. Other specified hypothyroidism  Requesting refill of levothyroxine 125 mcg daily. Asymptomatic. TSH 0.26 in 12/2019.   Patient appointment with new PCP in January.     ROS   Denies any recent fevers or chills. No nausea or vomiting. No chest pains or shortness of breath.     No Known Allergies    Current medicines (including changes today)  Current Outpatient Medications   Medication Sig Dispense Refill   • meloxicam (MOBIC) 15 MG tablet      • levothyroxine (SYNTHROID) 125 MCG Tab Take 1 Tab by mouth Every morning on an empty stomach. 90 Tab 0   • PARoxetine (PAXIL) 10 MG Tab TAKE ONE TABLET BY MOUTH ONCE DAILY 90 Tab 1   • lisinopril-hydrochlorothiazide (PRINZIDE) 20-12.5 MG per tablet Take 1 Tab by mouth every day. 90 Tab 3   • levothyroxine (SYNTHROID) 125 MCG Tab TAKE ONE TABLET BY MOUTH ONCE DAILY on an empty stomach 90 Tab 3   • SulfADIAZINE 500 MG Tab Take 1,000 mg by mouth 2 Times a Day. 2 pills in am and 2 pills in pm      • Nutritional Supplements (VITAMIN D BOOSTER PO) Take  by mouth.     • acetaminophen (TYLENOL) 500 MG Tab Take 1 Tab by mouth every 6 hours. 60 Tab 1   • XELJANZ 5 MG Tab Take 1 Tab by mouth 2 Times a Day.  1   • B Complex Vitamins (VITAMIN B COMPLEX) Tab Take  by mouth.     • folic acid (FOLVITE) 1 MG TABS Take 1 mg by mouth every day.     • sulfaSALAzine (AZULFIDINE EN-TAB) 500 MG EC tablet      • methylPREDNISolone (MEDROL DOSEPAK) 4 MG Tablet Therapy Pack As directed on the packaging label. (Patient  not taking: Reported on 12/7/2020) 21 Tab 0   • meloxicam (MOBIC) 7.5 MG Tab Take 7.5 mg by mouth every day.       No current facility-administered medications for this visit.        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 01/31/2019   • Post-op pain 01/31/2019   • Pre-operative cardiovascular examination 12/31/2018   • Menopausal disorder 09/01/2017   • Obesity (BMI 30-39.9) 12/01/2016   • Anxiety 08/07/2013   • Hypertension 09/22/2010   • Rheumatoid arthritis (HCC) 05/27/2010   • Hypothyroid 05/27/2010       Family History   Problem Relation Age of Onset   • Hypertension Mother    • Thyroid Mother    • Heart Disease Mother         Heart Attack   • Heart Disease Maternal Grandmother    • Heart Disease Maternal Grandfather    • No Known Problems Brother    • Diabetes Neg Hx    • Cancer Neg Hx        She  has a past medical history of Anemia, Anxiety disorder, Arthritis, Asthma, Heart burn, Heart murmur, High cholesterol, Hypertension (9/22/2010), Hypothyroid (5/27/2010), MRSA (methicillin resistant Staphylococcus aureus), Pain (03/2018), Rheumatoid arthritis (HCC), and Scarlet fever (when pt was a child).  She  has a past surgical history that includes tumor excision with biopsy (2005); hysteroscopy with video diagnostic (N/A, 3/30/2018); pelvic exam under anesthesia (N/A, 3/30/2018); dilation and curettage (N/A, 3/30/2018); other orthopedic surgery (Left, 1990); other orthopedic surgery (Left); other orthopedic surgery (Right); other; hip arthroplasty total (Right, 1/30/2019); and pr total hip arthroplasty (Left, 6/26/2019).       Objective:   LMP 12/09/2018     Physical Exam:via VV  Constitutional: Alert, no distress, well-groomed.  Skin: No rashes in visible areas.  Eye: Round. Conjunctiva clear, lids normal. No icterus.   ENMT: Lips pink without lesions, good dentition, moist mucous membranes.  Phonation normal.  Neck: No masses, no thyromegaly. Moves freely without pain.  Respiratory: Unlabored respiratory effort, no cough or audible wheeze  Psych: Alert and oriented x3, normal affect and mood.       Assessment and Plan:   The following treatment plan was discussed:     1. Other specified hypothyroidism  Synthroid refilled.  Stable.     Other orders  - meloxicam (MOBIC) 15 MG tablet  - sulfaSALAzine (AZULFIDINE EN-TAB) 500 MG EC tablet  - levothyroxine (SYNTHROID) 125 MCG Tab; Take 1 Tab by mouth Every morning on an empty stomach.  Dispense: 90 Tab; Refill: 0        Follow-up: No follow-ups on file.

## 2020-12-29 ENCOUNTER — TELEPHONE (OUTPATIENT)
Dept: MEDICAL GROUP | Facility: PHYSICIAN GROUP | Age: 55
End: 2020-12-29

## 2020-12-29 NOTE — TELEPHONE ENCOUNTER
Future Appointments       Provider Department Center    1/4/2021 4:00 PM JONATHAN Capone [facility]        ESTABLISHED PATIENT PRE-VISIT PLANNING     Patient was NOT contacted to complete PVP.    1.  Reviewed notes from the last few office visits within the medical group: Yes    2.  If any orders were placed at last visit or intended to be done for this visit (i.e. 6 mos follow-up), do we have Results/Consult Notes?        •  Labs - Labs were not ordered at last office visit.        •  Imaging - Imaging was not ordered at last office visit.       •  Referrals - No referrals were ordered at last office visit.    3. Is this appointment scheduled as a Hospital Follow-Up? No    4.  Immunizations were updated in Epic using Reconcile Outside Information activity? Yes    5.  Patient is due for the following Health Maintenance Topics:   Health Maintenance Due   Topic Date Due   • Annual Wellness Visit  1965   • HEPATITIS C SCREENING  1965   • COLON CANCER SCREENING ANNUAL FIT  03/11/2014   • IMM ZOSTER VACCINES (1 of 2) 03/11/2014   • PAP SMEAR  07/27/2020     6.  AHA (Pulse8) form printed for Provider? Email sent to [facility] requesting form

## 2021-01-04 ENCOUNTER — TELEMEDICINE (OUTPATIENT)
Dept: MEDICAL GROUP | Facility: PHYSICIAN GROUP | Age: 56
End: 2021-01-04
Payer: MEDICARE

## 2021-01-04 DIAGNOSIS — F41.9 ANXIETY: ICD-10-CM

## 2021-01-04 DIAGNOSIS — Z00.00 HEALTHCARE MAINTENANCE: ICD-10-CM

## 2021-01-04 DIAGNOSIS — M06.9 RHEUMATOID ARTHRITIS, INVOLVING UNSPECIFIED SITE, UNSPECIFIED WHETHER RHEUMATOID FACTOR PRESENT (HCC): ICD-10-CM

## 2021-01-04 PROBLEM — Z01.810 PRE-OPERATIVE CARDIOVASCULAR EXAMINATION: Status: RESOLVED | Noted: 2018-12-31 | Resolved: 2021-01-04

## 2021-01-04 PROBLEM — G89.18 POST-OP PAIN: Status: RESOLVED | Noted: 2019-01-31 | Resolved: 2021-01-04

## 2021-01-04 PROCEDURE — 99213 OFFICE O/P EST LOW 20 MIN: CPT | Performed by: NURSE PRACTITIONER

## 2021-01-04 ASSESSMENT — PATIENT HEALTH QUESTIONNAIRE - PHQ9: CLINICAL INTERPRETATION OF PHQ2 SCORE: 0

## 2021-01-04 NOTE — PROGRESS NOTES
Virtual Visit: Established Patient   This visit was conducted via Zoom using secure and encrypted videoconferencing technology. The patient was in a private location in the state of [place].    The patient's identity was confirmed and verbal consent was obtained for this virtual visit.    Subjective:   CC:   Chief Complaint   Patient presents with   • [facility]       Abdias Viera is a 55 y.o. female presenting to [facility] with me, and discuss anxiety issues.  Patient is interested in COVID-19 vaccine and referral to new gynecologist.     ROS   Denies any recent fevers or chills. No nausea or vomiting. No chest pains or shortness of breath. No chest pain or palpations.    No Known Allergies    Current medicines (including changes today)  Current Outpatient Medications   Medication Sig Dispense Refill   • meloxicam (MOBIC) 15 MG tablet      • sulfaSALAzine (AZULFIDINE EN-TAB) 500 MG EC tablet      • PARoxetine (PAXIL) 10 MG Tab TAKE ONE TABLET BY MOUTH ONCE DAILY 90 Tab 1   • lisinopril-hydrochlorothiazide (PRINZIDE) 20-12.5 MG per tablet Take 1 Tab by mouth every day. 90 Tab 3   • levothyroxine (SYNTHROID) 125 MCG Tab TAKE ONE TABLET BY MOUTH ONCE DAILY on an empty stomach 90 Tab 3   • Nutritional Supplements (VITAMIN D BOOSTER PO) Take  by mouth.     • acetaminophen (TYLENOL) 500 MG Tab Take 1 Tab by mouth every 6 hours. 60 Tab 1   • XELJANZ 5 MG Tab Take 1 Tab by mouth 2 Times a Day.  1   • B Complex Vitamins (VITAMIN B COMPLEX) Tab Take  by mouth.     • folic acid (FOLVITE) 1 MG TABS Take 1 mg by mouth every day.     • SulfADIAZINE 500 MG Tab Take 1,000 mg by mouth 2 Times a Day. 2 pills in am and 2 pills in pm        No current facility-administered medications for this visit.        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 01/31/2019   • Menopausal disorder 09/01/2017   •  "Obesity (BMI 30-39.9) 12/01/2016   • Anxiety 08/07/2013   • Hypertension 09/22/2010   • Rheumatoid arthritis (HCC) 05/27/2010   • Hypothyroid 05/27/2010       Family History   Problem Relation Age of [place]   • Hypertension Mother    • Thyroid Mother    • Heart Disease Mother         Heart Attack   • Heart Disease Maternal Grandmother    • Heart Disease Maternal Grandfather    • No Known Problems Brother    • Diabetes Neg Hx    • Cancer Neg Hx        She  has a past medical history of Anemia, Anxiety disorder, Arthritis, Asthma, Heart burn, Heart murmur, High cholesterol, Hypertension (9/22/2010), Hypothyroid (5/27/2010), MRSA (methicillin resistant Staphylococcus aureus), Pain (03/2018), Rheumatoid arthritis (HCC), and Scarlet fever (when pt was a child).  She  has a past surgical history that includes tumor excision with biopsy (2005); hysteroscopy with video diagnostic (N/A, 3/30/2018); pelvic exam under anesthesia (N/A, 3/30/2018); dilation and curettage (N/A, 3/30/2018); other orthopedic surgery (Left, 1990); other orthopedic surgery (Left); other orthopedic surgery (Right); other; hip arthroplasty total (Right, 1/30/2019); and pr total hip arthroplasty (Left, 6/26/2019).       Objective:   LMP 12/09/2018     Physical Exam:   Vital Signs:  Height: 5'4\", weight 195 lbs, RR visually estimated to be 14.  Constitutional: Alert, no distress, well-groomed.  Skin: No rashes in visible areas.  Eye: Round. Conjunctiva clear, lids normal. No icterus.   ENMT: Lips pink without lesions, good dentition, moist mucous membranes. Phonation normal.  Neck: No masses, no thyromegaly. Moves freely without pain.  Respiratory: Unlabored respiratory effort, no cough or audible wheeze  Psych: Alert and oriented x3, normal affect and mood.       Assessment and Plan:   The following treatment plan was discussed:     1. Anxiety  This is a chronic condition, stable on Paxil.  The patient reports that she has been doing very well with Paxil " and has had few panic attacks since her last visit, but overall feels well.    2. Rheumatoid arthritis, involving unspecified site, unspecified whether rheumatoid factor present (HCC)  This is a chronic condition, stable.  The patient reports that she has been doing very well with her RA with Xeljanz.  She is followed closely by Dr. Kan and will be seeing her next month.      3.  Healthcare maintenance.  Patient was given name for new gynecologist whom she will schedule follow up with.  We discussed availability of COVID-19 vaccine hopefully within the next two months.  She is due for annual wellness exam this year and labs, and would like to defer these until that time.       Upon chart review for Pulse 8, patient diagnosed with diabetes without complication.  Patient has had recent fasting blood glucose of 102 on 10/2/2020, and this does not support a diagnosis of diabetes.  This will be removed from patient's chart.     Follow-up: Return in about 10 months (around 11/4/2021) for routine labs and wellness visit.

## 2021-01-05 NOTE — ASSESSMENT & PLAN NOTE
Patient was given name for new gynecologist whom she will schedule follow up with.  We discussed availability of COVID-19 vaccine hopefully within the next two months.  She is due for annual wellness exam this year and labs, and would like to defer these until that time.

## 2021-01-05 NOTE — ASSESSMENT & PLAN NOTE
This is a chronic condition, stable.  The patient reports that she has been doing very well with her RA with Xeljanz.  She is followed closely by Dr. Kan and will be seeing her next month.

## 2021-01-05 NOTE — ASSESSMENT & PLAN NOTE
This is a chronic condition, stable on Paxil.  The patient reports that she has been doing very well with Paxil and has had few panic attacks since her last visit, but overall feels well.

## 2021-01-30 ENCOUNTER — HOSPITAL ENCOUNTER (OUTPATIENT)
Dept: LAB | Facility: MEDICAL CENTER | Age: 56
End: 2021-01-30
Attending: SPECIALIST
Payer: MEDICARE

## 2021-01-30 LAB
ALBUMIN SERPL BCP-MCNC: 4.5 G/DL (ref 3.2–4.9)
ALBUMIN/GLOB SERPL: 1.7 G/DL
ALP SERPL-CCNC: 92 U/L (ref 30–99)
ALT SERPL-CCNC: 23 U/L (ref 2–50)
ANION GAP SERPL CALC-SCNC: 10 MMOL/L (ref 7–16)
AST SERPL-CCNC: 22 U/L (ref 12–45)
BASOPHILS # BLD AUTO: 0.7 % (ref 0–1.8)
BASOPHILS # BLD: 0.03 K/UL (ref 0–0.12)
BILIRUB SERPL-MCNC: 0.6 MG/DL (ref 0.1–1.5)
BUN SERPL-MCNC: 20 MG/DL (ref 8–22)
CALCIUM SERPL-MCNC: 9.7 MG/DL (ref 8.5–10.5)
CHLORIDE SERPL-SCNC: 105 MMOL/L (ref 96–112)
CHOLEST SERPL-MCNC: 214 MG/DL (ref 100–199)
CO2 SERPL-SCNC: 26 MMOL/L (ref 20–33)
CREAT SERPL-MCNC: 0.85 MG/DL (ref 0.5–1.4)
EOSINOPHIL # BLD AUTO: 0.23 K/UL (ref 0–0.51)
EOSINOPHIL NFR BLD: 5.1 % (ref 0–6.9)
ERYTHROCYTE [DISTWIDTH] IN BLOOD BY AUTOMATED COUNT: 47.3 FL (ref 35.9–50)
GLOBULIN SER CALC-MCNC: 2.7 G/DL (ref 1.9–3.5)
GLUCOSE SERPL-MCNC: 107 MG/DL (ref 65–99)
HCT VFR BLD AUTO: 40.5 % (ref 37–47)
HDLC SERPL-MCNC: 94 MG/DL
HGB BLD-MCNC: 13.1 G/DL (ref 12–16)
IMM GRANULOCYTES # BLD AUTO: 0.01 K/UL (ref 0–0.11)
IMM GRANULOCYTES NFR BLD AUTO: 0.2 % (ref 0–0.9)
LDLC SERPL CALC-MCNC: 107 MG/DL
LYMPHOCYTES # BLD AUTO: 1.06 K/UL (ref 1–4.8)
LYMPHOCYTES NFR BLD: 23.7 % (ref 22–41)
MCH RBC QN AUTO: 31.6 PG (ref 27–33)
MCHC RBC AUTO-ENTMCNC: 32.3 G/DL (ref 33.6–35)
MCV RBC AUTO: 97.6 FL (ref 81.4–97.8)
MONOCYTES # BLD AUTO: 0.37 K/UL (ref 0–0.85)
MONOCYTES NFR BLD AUTO: 8.3 % (ref 0–13.4)
NEUTROPHILS # BLD AUTO: 2.77 K/UL (ref 2–7.15)
NEUTROPHILS NFR BLD: 62 % (ref 44–72)
NRBC # BLD AUTO: 0 K/UL
NRBC BLD-RTO: 0 /100 WBC
PLATELET # BLD AUTO: 227 K/UL (ref 164–446)
PMV BLD AUTO: 11.1 FL (ref 9–12.9)
POTASSIUM SERPL-SCNC: 4.5 MMOL/L (ref 3.6–5.5)
PROT SERPL-MCNC: 7.2 G/DL (ref 6–8.2)
RBC # BLD AUTO: 4.15 M/UL (ref 4.2–5.4)
SODIUM SERPL-SCNC: 141 MMOL/L (ref 135–145)
TRIGL SERPL-MCNC: 63 MG/DL (ref 0–149)
WBC # BLD AUTO: 4.5 K/UL (ref 4.8–10.8)

## 2021-01-30 PROCEDURE — 36415 COLL VENOUS BLD VENIPUNCTURE: CPT

## 2021-01-30 PROCEDURE — 80061 LIPID PANEL: CPT

## 2021-01-30 PROCEDURE — 85025 COMPLETE CBC W/AUTO DIFF WBC: CPT

## 2021-01-30 PROCEDURE — 80053 COMPREHEN METABOLIC PANEL: CPT

## 2021-03-08 ENCOUNTER — PATIENT MESSAGE (OUTPATIENT)
Dept: MEDICAL GROUP | Facility: PHYSICIAN GROUP | Age: 56
End: 2021-03-08

## 2021-03-08 NOTE — TELEPHONE ENCOUNTER
From: Abdias Viera  To: Medical Assistant Prema ERICKSON  Sent: 3/8/2021 11:41 AM PST  Subject: Prescription Question    Sorry I thought I sent the script. Its for my up coming surgery. March 22, 2021      ----- Message -----   From:Medical Assistant Prema ERICKSON   Sent:3/8/2021 11:04 AM PST   To:Abdias Viera   Subject:RE: Prescription Question    Yovani Calero,   Is this for a Covid test? If so, do you have an order already for it?   The Covid testing is down by [facility].   Prema Cooper, Med Ass't      ----- Message -----   From:Abdias Viera   Sent:3/8/2021 10:57 AM PST   To:Nurse Practicioner Jess Mcbride   Subject:Prescription Question    OK here's the question I have labs for nasal swab for a pre auth for my surgery on March 22 2021. Can I come into [facility] [place] for this??

## 2021-03-08 NOTE — TELEPHONE ENCOUNTER
From: Abdias Viera  To: Nurse Practicioner Jess Mcbride  Sent: 3/8/2021 10:57 AM PST  Subject: Prescription Question    OK here's the question I have labs for nasal swab for a pre auth for my surgery on March 22 2021. Can I come into [facility] [place] for this??

## 2021-03-15 DIAGNOSIS — Z23 NEED FOR VACCINATION: ICD-10-CM

## 2021-03-18 ENCOUNTER — HOSPITAL ENCOUNTER (OUTPATIENT)
Dept: LAB | Facility: MEDICAL CENTER | Age: 56
End: 2021-03-18
Attending: ANESTHESIOLOGY
Payer: MEDICARE

## 2021-03-18 LAB
COVID ORDER STATUS COVID19: NORMAL
SARS-COV-2 RNA RESP QL NAA+PROBE: NOTDETECTED
SPECIMEN SOURCE: NORMAL

## 2021-03-18 PROCEDURE — C9803 HOPD COVID-19 SPEC COLLECT: HCPCS

## 2021-03-18 PROCEDURE — U0005 INFEC AGEN DETEC AMPLI PROBE: HCPCS

## 2021-03-18 PROCEDURE — U0003 INFECTIOUS AGENT DETECTION BY NUCLEIC ACID (DNA OR RNA); SEVERE ACUTE RESPIRATORY SYNDROME CORONAVIRUS 2 (SARS-COV-2) (CORONAVIRUS DISEASE [COVID-19]), AMPLIFIED PROBE TECHNIQUE, MAKING USE OF HIGH THROUGHPUT TECHNOLOGIES AS DESCRIBED BY CMS-2020-01-R: HCPCS

## 2021-03-19 DIAGNOSIS — E03.9 ACQUIRED HYPOTHYROIDISM: ICD-10-CM

## 2021-03-19 RX ORDER — LEVOTHYROXINE SODIUM 0.12 MG/1
125 TABLET ORAL
Qty: 90 TABLET | Refills: 0 | Status: SHIPPED | OUTPATIENT
Start: 2021-03-19 | End: 2021-06-28 | Stop reason: SDUPTHER

## 2021-03-20 NOTE — TELEPHONE ENCOUNTER
Last seen by PCP 01/4/2021.     TSH   Date Value Ref Range Status   12/02/2019 0.620 0.380 - 5.330 uIU/mL Final     Comment:     Please note new reference ranges effective 12/14/2017 10:00 AM  Pregnant Females, 1st Trimester  0.050-3.700  Pregnant Females, 2nd Trimester  0.310-4.350  Pregnant Females, 3rd Trimester  0.410-5.180     Pt to make appt and get labs prior to more refills.

## 2021-05-11 DIAGNOSIS — N95.9 MENOPAUSAL DISORDER: ICD-10-CM

## 2021-05-11 RX ORDER — PAROXETINE 10 MG/1
TABLET, FILM COATED ORAL
Qty: 90 TABLET | Refills: 1 | Status: SHIPPED | OUTPATIENT
Start: 2021-05-11 | End: 2021-11-22

## 2021-05-14 ENCOUNTER — HOSPITAL ENCOUNTER (OUTPATIENT)
Dept: LAB | Facility: MEDICAL CENTER | Age: 56
End: 2021-05-14
Attending: SPECIALIST
Payer: MEDICARE

## 2021-05-14 LAB
ALBUMIN SERPL BCP-MCNC: 4.2 G/DL (ref 3.2–4.9)
ALP SERPL-CCNC: 99 U/L (ref 30–99)
ALT SERPL-CCNC: 22 U/L (ref 2–50)
AST SERPL-CCNC: 18 U/L (ref 12–45)
BASOPHILS # BLD AUTO: 0.5 % (ref 0–1.8)
BASOPHILS # BLD: 0.03 K/UL (ref 0–0.12)
BILIRUB CONJ SERPL-MCNC: <0.2 MG/DL (ref 0.1–0.5)
BILIRUB INDIRECT SERPL-MCNC: NORMAL MG/DL (ref 0–1)
BILIRUB SERPL-MCNC: 0.3 MG/DL (ref 0.1–1.5)
EOSINOPHIL # BLD AUTO: 0.19 K/UL (ref 0–0.51)
EOSINOPHIL NFR BLD: 3.1 % (ref 0–6.9)
ERYTHROCYTE [DISTWIDTH] IN BLOOD BY AUTOMATED COUNT: 45.5 FL (ref 35.9–50)
HCT VFR BLD AUTO: 36.5 % (ref 37–47)
HGB BLD-MCNC: 12.1 G/DL (ref 12–16)
IMM GRANULOCYTES # BLD AUTO: 0.03 K/UL (ref 0–0.11)
IMM GRANULOCYTES NFR BLD AUTO: 0.5 % (ref 0–0.9)
LYMPHOCYTES # BLD AUTO: 1.01 K/UL (ref 1–4.8)
LYMPHOCYTES NFR BLD: 16.6 % (ref 22–41)
MCH RBC QN AUTO: 30.8 PG (ref 27–33)
MCHC RBC AUTO-ENTMCNC: 33.2 G/DL (ref 33.6–35)
MCV RBC AUTO: 92.9 FL (ref 81.4–97.8)
MONOCYTES # BLD AUTO: 0.5 K/UL (ref 0–0.85)
MONOCYTES NFR BLD AUTO: 8.2 % (ref 0–13.4)
NEUTROPHILS # BLD AUTO: 4.34 K/UL (ref 2–7.15)
NEUTROPHILS NFR BLD: 71.1 % (ref 44–72)
NRBC # BLD AUTO: 0 K/UL
NRBC BLD-RTO: 0 /100 WBC
PLATELET # BLD AUTO: 235 K/UL (ref 164–446)
PMV BLD AUTO: 11.1 FL (ref 9–12.9)
PROT SERPL-MCNC: 7 G/DL (ref 6–8.2)
RBC # BLD AUTO: 3.93 M/UL (ref 4.2–5.4)
WBC # BLD AUTO: 6.1 K/UL (ref 4.8–10.8)

## 2021-05-14 PROCEDURE — 80076 HEPATIC FUNCTION PANEL: CPT

## 2021-05-14 PROCEDURE — 36415 COLL VENOUS BLD VENIPUNCTURE: CPT

## 2021-05-14 PROCEDURE — 83036 HEMOGLOBIN GLYCOSYLATED A1C: CPT

## 2021-05-14 PROCEDURE — 85025 COMPLETE CBC W/AUTO DIFF WBC: CPT

## 2021-05-16 LAB
EST. AVERAGE GLUCOSE BLD GHB EST-MCNC: 103 MG/DL
HBA1C MFR BLD: 5.2 % (ref 4–5.6)

## 2021-06-28 ENCOUNTER — PATIENT MESSAGE (OUTPATIENT)
Dept: MEDICAL GROUP | Facility: PHYSICIAN GROUP | Age: 56
End: 2021-06-28

## 2021-06-28 RX ORDER — LEVOTHYROXINE SODIUM 0.12 MG/1
125 TABLET ORAL
Qty: 90 TABLET | Refills: 0 | Status: SHIPPED | OUTPATIENT
Start: 2021-06-28 | End: 2021-09-22

## 2021-06-28 NOTE — TELEPHONE ENCOUNTER
From: Abdias Viera  To: Nurse Practitioner Jess Mcbride  Sent: 6/28/2021 10:07 AM PDT  Subject: Prescription Question    Hey ladies I called in my Levothyroxin on Thurs or Friday and still nothing. I'm all out of it and desperately need it. Can you please refill it at [facility]? ASAP? Thank you so much.   Abdias

## 2021-07-28 DIAGNOSIS — B34.9 VIRAL ILLNESS: ICD-10-CM

## 2021-08-13 ENCOUNTER — PATIENT MESSAGE (OUTPATIENT)
Dept: HEALTH INFORMATION MANAGEMENT | Facility: OTHER | Age: 56
End: 2021-08-13

## 2021-09-07 ENCOUNTER — OFFICE VISIT (OUTPATIENT)
Dept: MEDICAL GROUP | Facility: PHYSICIAN GROUP | Age: 56
End: 2021-09-07
Payer: MEDICARE

## 2021-09-07 VITALS
OXYGEN SATURATION: 95 % | RESPIRATION RATE: 16 BRPM | DIASTOLIC BLOOD PRESSURE: 70 MMHG | HEIGHT: 64 IN | HEART RATE: 67 BPM | TEMPERATURE: 98.8 F | WEIGHT: 193 LBS | SYSTOLIC BLOOD PRESSURE: 118 MMHG | BODY MASS INDEX: 32.95 KG/M2

## 2021-09-07 DIAGNOSIS — M06.9 RHEUMATOID ARTHRITIS, INVOLVING UNSPECIFIED SITE, UNSPECIFIED WHETHER RHEUMATOID FACTOR PRESENT (HCC): ICD-10-CM

## 2021-09-07 DIAGNOSIS — E03.8 OTHER SPECIFIED HYPOTHYROIDISM: ICD-10-CM

## 2021-09-07 DIAGNOSIS — Z12.31 ENCOUNTER FOR SCREENING MAMMOGRAM FOR BREAST CANCER: ICD-10-CM

## 2021-09-07 DIAGNOSIS — H57.12 ACUTE LEFT EYE PAIN: Primary | ICD-10-CM

## 2021-09-07 PROCEDURE — 99214 OFFICE O/P EST MOD 30 MIN: CPT | Performed by: NURSE PRACTITIONER

## 2021-09-07 ASSESSMENT — FIBROSIS 4 INDEX: FIB4 SCORE: 0.93

## 2021-09-07 NOTE — ASSESSMENT & PLAN NOTE
Chronic condition, stable.  Patient continues with sulfasalazine and Xeljanz through her rheumatologist, Dr. Kan.  She is doing well with these medications and will continue her current regimen.

## 2021-09-07 NOTE — ASSESSMENT & PLAN NOTE
Chronic condition.  Patient takes levothyroxine 125 mcg daily. She tolerates this well.   Her last TSH was 0.62 December 2019.  She is due for updated labs today.

## 2021-09-07 NOTE — PROGRESS NOTES
Subjective:     CC: Acute left eye pain.    HPI:   Abdias presents today with acute left eye pain. Patient states that about an hour prior to her appointment here, she was using a blower and something blew into her left eye.  She had immediate onset of pain and tearing. She did try to flush her eye with water and eyedrops, but this did not help.  She describes 9/10 eye pain and is holding her hand over her eye.  She is having a difficult time opening it.      Past Medical History:   Diagnosis Date   • Anemia     H/O with end of menopause   • Anxiety disorder    • Arthritis     RA   • Asthma     uses inhalers as needed   • Heart burn    • Heart murmur    • High cholesterol     no meds   • Hypertension 9/22/2010   • Hypothyroid 5/27/2010   • MRSA (methicillin resistant Staphylococcus aureus)     Hx of MRSA (when taking Enbrel) 7-8yrs ago   • Pain 03/2018    RA pain   • Rheumatoid arthritis (HCC)    • Scarlet fever when pt was a child       Social History     Tobacco Use   • Smoking status: Never Smoker   • Smokeless tobacco: Never Used   Vaping Use   • Vaping Use: Never used   Substance Use Topics   • Alcohol use: No     Alcohol/week: 0.0 oz   • Drug use: Yes     Types: Marijuana, Inhaled     Comment: marijuana, CBD PRN (cocaine crank last 1988)       Current Outpatient Medications Ordered in Epic   Medication Sig Dispense Refill   • levothyroxine (SYNTHROID) 125 MCG Tab Take 1 tablet by mouth every morning on an empty stomach. Please remind pt to get thyroid labs done 90 tablet 0   • PARoxetine (PAXIL) 10 MG Tab TAKE ONE TABLET BY MOUTH ONCE DAILY 90 tablet 1   • meloxicam (MOBIC) 15 MG tablet      • sulfaSALAzine (AZULFIDINE EN-TAB) 500 MG EC tablet      • lisinopril-hydrochlorothiazide (PRINZIDE) 20-12.5 MG per tablet Take 1 Tab by mouth every day. 90 Tab 3   • SulfADIAZINE 500 MG Tab Take 1,000 mg by mouth 2 Times a Day. 2 pills in am and 2 pills in pm      • Nutritional Supplements (VITAMIN D BOOSTER PO) Take  by  "mouth.     • acetaminophen (TYLENOL) 500 MG Tab Take 1 Tab by mouth every 6 hours. 60 Tab 1   • XELJANZ 5 MG Tab Take 1 Tab by mouth 2 Times a Day.  1   • B Complex Vitamins (VITAMIN B COMPLEX) Tab Take  by mouth.     • folic acid (FOLVITE) 1 MG TABS Take 1 mg by mouth every day.       No current Epic-ordered facility-administered medications on file.       Allergies:  Patient has no known allergies.    Health Maintenance: Completed    ROS:  Gen: no fevers/chills, no changes in weight  Eyes: Acute pain, left eye - possible abrasion or foreign body  ENT: no sore throat, no hearing loss, no bloody nose  Pulm: no sob, no cough  CV: no chest pain, no palpitations  GI: no nausea/vomiting, no diarrhea  : no dysuria  MSk: no myalgias  Skin: no rash  Neuro: no headaches, no numbness/tingling  Heme/Lymph: no easy bruising      Objective:       Exam:  /70   Pulse 67   Temp 37.1 °C (98.8 °F)   Resp 16   Ht 1.613 m (5' 3.5\")   Wt 87.5 kg (193 lb)   LMP 12/09/2018   SpO2 95%   BMI 33.65 kg/m²  Body mass index is 33.65 kg/m².    Gen: Alert and oriented, No apparent distress.  HEENT:  Upon visualization of the left eye with slit lamp and flourescein statining, patient was noted to have excessive collection of flourescein dye of the upper cornea, as well as a darkened area in the inferior corner of the left eye on the conjunctiva.   Neck: Neck is supple without lymphadenopathy.  No carotid bruits.  Lungs: Normal effort, CTA bilaterally, no wheezes, rhonchi, or rales  CV: Regular rate and rhythm. No murmurs, rubs, or gallops.  Ext: No clubbing, cyanosis, edema.    Labs: None.    Assessment & Plan:     56 y.o. female with the following -     1. Acute left eye pain  Acute condition.  Patient reports that she was starting to do some cleaning at home in her garage, and she turned on her blower and blew something into her eye.  This happened approximately 30 minutes ago. Patient states that she feels acute pain, and can " hardly open her eye and it is tearing. She states that she tried flushing her eye with water and with eyedrops.  Upon visualization with slit lamp and flourescein statining, patient was noted to have excessive collection of flourescein dye of the upper cornea, as well as a darkened area in the inferior corner of the left eye on the conjunctiva. Patient was referred emergently to her ophthalmologist, Dr. Peters at [facility]. [facility] was called, and  Dr. Peters was able to see patient at 4:00 pm, directly after patient saw me.     2. Other specified hypothyroidism  Chronic condition.  Patient takes levothyroxine 125 mcg daily. She tolerates this well.   Her last TSH was 0.62 December 2019.  She is due for updated labs today.      3. Rheumatoid arthritis, involving unspecified site, unspecified whether rheumatoid factor present (HCC)  Chronic condition, stable.  Patient continues with sulfasalazine and Xeljanz through her rheumatologist, Dr. Kan.  She is doing well with these medications and will continue her current regimen.     4. Encounter for screening mammogram for breast cancer  - MA-SCREENING MAMMO BILAT W/TOMOSYNTHESIS W/CAD; Future      Return in about 6 months (around 3/7/2022).    I have placed the below orders and discussed them with an approved delegating provider.  The MA is performing the below orders under the direction of Morelia Walters MD.    My total time spent caring for the patient on the day of the encounter was 40 minutes. This does not include time spent on separately billable procedures/tests.    Please note that this dictation was created using voice recognition software. I have made every reasonable attempt to correct obvious errors, but I expect that there are errors of grammar and possibly content that I did not discover before finalizing the note.

## 2021-09-07 NOTE — ASSESSMENT & PLAN NOTE
Acute condition.  Patient reports that she was starting to do some cleaning at home in her garage, and she turned on her blower and blew something into her eye.  This happened approximately 30 minutes ago. Patient states that she feels acute pain, and can hardly open her eye and it is tearing. She states that she tried flushing her eye with water and with eyedrops.  Upon visualization with slit lamp and flourescein statining, patient was noted to have excessive collection of flourescein dye of the upper cornea, as well as a darkened area in the inferior corner of the left eye on the conjunctiva. Patient was referred emergently to her ophthalmologist, Dr. Peters at [facility].  Dr. Peters was able to see patient at 4:00 pm, directly after patient saw me.

## 2021-09-22 RX ORDER — LEVOTHYROXINE SODIUM 0.12 MG/1
TABLET ORAL
Qty: 90 TABLET | Refills: 0 | Status: SHIPPED
Start: 2021-09-22 | End: 2021-11-11

## 2021-09-23 NOTE — PROGRESS NOTES
Patient Abdias Viera was discharge on 1/31 /2019 for Total Hip Arthroplasty. IHD Patient Advocate assisted with multiple discharge order including, 1- Orthopedic surgeon follow up with [facility] on 2/14/19 which was kept. Patient did not follow up with her PCP and declined IHD assistance on scheduled hospital follow up appointment.  The patient has 1 future  orthopedic follow up  appointment scheduled  with [facility] on 3/14.  
Normal vision: sees adequately in most situations; can see medication labels, newsprint

## 2021-10-13 RX ORDER — LISINOPRIL AND HYDROCHLOROTHIAZIDE 20; 12.5 MG/1; MG/1
1 TABLET ORAL DAILY
Qty: 90 TABLET | Refills: 0 | Status: SHIPPED | OUTPATIENT
Start: 2021-10-13 | End: 2021-12-27 | Stop reason: SDUPTHER

## 2021-10-25 ENCOUNTER — HOSPITAL ENCOUNTER (OUTPATIENT)
Dept: LAB | Facility: MEDICAL CENTER | Age: 56
End: 2021-10-25
Attending: NURSE PRACTITIONER
Payer: MEDICARE

## 2021-10-25 DIAGNOSIS — E03.9 ACQUIRED HYPOTHYROIDISM: ICD-10-CM

## 2021-10-25 LAB
ALBUMIN SERPL BCP-MCNC: 4.7 G/DL (ref 3.2–4.9)
ALBUMIN/GLOB SERPL: 1.7 G/DL
ALP SERPL-CCNC: 103 U/L (ref 30–99)
ALT SERPL-CCNC: 20 U/L (ref 2–50)
ANION GAP SERPL CALC-SCNC: 13 MMOL/L (ref 7–16)
AST SERPL-CCNC: 20 U/L (ref 12–45)
BASOPHILS # BLD AUTO: 0.5 % (ref 0–1.8)
BASOPHILS # BLD: 0.02 K/UL (ref 0–0.12)
BILIRUB SERPL-MCNC: 0.6 MG/DL (ref 0.1–1.5)
BUN SERPL-MCNC: 15 MG/DL (ref 8–22)
CALCIUM SERPL-MCNC: 9.8 MG/DL (ref 8.5–10.5)
CHLORIDE SERPL-SCNC: 104 MMOL/L (ref 96–112)
CHOLEST SERPL-MCNC: 222 MG/DL (ref 100–199)
CO2 SERPL-SCNC: 25 MMOL/L (ref 20–33)
CREAT SERPL-MCNC: 0.73 MG/DL (ref 0.5–1.4)
EOSINOPHIL # BLD AUTO: 0.2 K/UL (ref 0–0.51)
EOSINOPHIL NFR BLD: 5 % (ref 0–6.9)
ERYTHROCYTE [DISTWIDTH] IN BLOOD BY AUTOMATED COUNT: 44.7 FL (ref 35.9–50)
GLOBULIN SER CALC-MCNC: 2.7 G/DL (ref 1.9–3.5)
GLUCOSE SERPL-MCNC: 106 MG/DL (ref 65–99)
HCT VFR BLD AUTO: 39.7 % (ref 37–47)
HDLC SERPL-MCNC: 94 MG/DL
HGB BLD-MCNC: 13.3 G/DL (ref 12–16)
IMM GRANULOCYTES # BLD AUTO: 0.02 K/UL (ref 0–0.11)
IMM GRANULOCYTES NFR BLD AUTO: 0.5 % (ref 0–0.9)
LDLC SERPL CALC-MCNC: 114 MG/DL
LYMPHOCYTES # BLD AUTO: 0.7 K/UL (ref 1–4.8)
LYMPHOCYTES NFR BLD: 17.6 % (ref 22–41)
MCH RBC QN AUTO: 31.7 PG (ref 27–33)
MCHC RBC AUTO-ENTMCNC: 33.5 G/DL (ref 33.6–35)
MCV RBC AUTO: 94.7 FL (ref 81.4–97.8)
MONOCYTES # BLD AUTO: 0.42 K/UL (ref 0–0.85)
MONOCYTES NFR BLD AUTO: 10.6 % (ref 0–13.4)
NEUTROPHILS # BLD AUTO: 2.62 K/UL (ref 2–7.15)
NEUTROPHILS NFR BLD: 65.8 % (ref 44–72)
NRBC # BLD AUTO: 0 K/UL
NRBC BLD-RTO: 0 /100 WBC
PLATELET # BLD AUTO: 237 K/UL (ref 164–446)
PMV BLD AUTO: 11 FL (ref 9–12.9)
POTASSIUM SERPL-SCNC: 4.1 MMOL/L (ref 3.6–5.5)
PROT SERPL-MCNC: 7.4 G/DL (ref 6–8.2)
RBC # BLD AUTO: 4.19 M/UL (ref 4.2–5.4)
SODIUM SERPL-SCNC: 142 MMOL/L (ref 135–145)
T4 FREE SERPL-MCNC: 1.75 NG/DL (ref 0.93–1.7)
TRIGL SERPL-MCNC: 70 MG/DL (ref 0–149)
TSH SERPL DL<=0.005 MIU/L-ACNC: 0.23 UIU/ML (ref 0.38–5.33)
WBC # BLD AUTO: 4 K/UL (ref 4.8–10.8)

## 2021-10-25 PROCEDURE — 36415 COLL VENOUS BLD VENIPUNCTURE: CPT

## 2021-10-25 PROCEDURE — 80053 COMPREHEN METABOLIC PANEL: CPT

## 2021-10-25 PROCEDURE — 84443 ASSAY THYROID STIM HORMONE: CPT

## 2021-10-25 PROCEDURE — 84439 ASSAY OF FREE THYROXINE: CPT

## 2021-10-25 PROCEDURE — 80061 LIPID PANEL: CPT

## 2021-10-25 PROCEDURE — 85025 COMPLETE CBC W/AUTO DIFF WBC: CPT

## 2021-11-02 ENCOUNTER — TELEPHONE (OUTPATIENT)
Dept: MEDICAL GROUP | Facility: PHYSICIAN GROUP | Age: 56
End: 2021-11-02

## 2021-11-02 NOTE — TELEPHONE ENCOUNTER
Regarding: Labs  ----- Message from Chantel L Magill, R.N. sent at 10/28/2021  4:10 PM PDT -----       ----- Message sent from Chantel L Magill, R.N. to Abdias Burdick at 10/28/2021  4:09 PM -----   Abdias,    I would like to schedule you an appointment to come in and talk to Jess about some of your thyroid labwork (nothing to be worried about). When would be a good day and time for you to come in?    Miya RN, BSN

## 2021-11-09 ENCOUNTER — APPOINTMENT (OUTPATIENT)
Dept: MEDICAL GROUP | Facility: PHYSICIAN GROUP | Age: 56
End: 2021-11-09
Payer: MEDICARE

## 2021-11-11 ENCOUNTER — OFFICE VISIT (OUTPATIENT)
Dept: MEDICAL GROUP | Facility: PHYSICIAN GROUP | Age: 56
End: 2021-11-11
Payer: MEDICARE

## 2021-11-11 VITALS
BODY MASS INDEX: 32.78 KG/M2 | HEIGHT: 63 IN | TEMPERATURE: 97.3 F | DIASTOLIC BLOOD PRESSURE: 74 MMHG | SYSTOLIC BLOOD PRESSURE: 128 MMHG | HEART RATE: 68 BPM | RESPIRATION RATE: 16 BRPM | OXYGEN SATURATION: 94 % | WEIGHT: 185 LBS

## 2021-11-11 DIAGNOSIS — E03.8 OTHER SPECIFIED HYPOTHYROIDISM: Primary | ICD-10-CM

## 2021-11-11 PROCEDURE — 99213 OFFICE O/P EST LOW 20 MIN: CPT | Performed by: NURSE PRACTITIONER

## 2021-11-11 RX ORDER — LEVOTHYROXINE SODIUM 112 UG/1
112 TABLET ORAL
Qty: 90 TABLET | Refills: 0 | Status: SHIPPED | OUTPATIENT
Start: 2021-11-11 | End: 2022-02-03

## 2021-11-11 RX ORDER — SULFASALAZINE 500 MG/1
1000 TABLET ORAL
COMMUNITY
Start: 2021-11-01 | End: 2022-07-17

## 2021-11-11 ASSESSMENT — FIBROSIS 4 INDEX: FIB4 SCORE: 1.08

## 2021-11-14 NOTE — ASSESSMENT & PLAN NOTE
Chronic condition.  Recent TSH 0.23 and FT4 1.75 on 10/25/2021.  Patient reports she feels well and denies palpitations, heat or cold intolerance, or unexplained weight loss.  Discussed with patient that we will reduce her levothyroxine to 112 mcg daily, and repeat her labs in 6 to 8 weeks.  Patient verbalizes understanding.

## 2021-11-14 NOTE — PROGRESS NOTES
Subjective:     CC: Hypothyroidism    HPI:   Abdias presents today for follow up of hypothyroidism. Recent labs reveal that she is over-replaced with a TSH of 0.23 and FT4 of 1.75.  Patient denies any tremors, heat or cold intolerance, or unexplained weight loss.     Past Medical History:   Diagnosis Date   • Anemia     H/O with end of menopause   • Anxiety disorder    • Arthritis     RA   • Asthma     uses inhalers as needed   • Heart burn    • Heart murmur    • High cholesterol     no meds   • Hypertension 9/22/2010   • Hypothyroid 5/27/2010   • MRSA (methicillin resistant Staphylococcus aureus)     Hx of MRSA (when taking Enbrel) 7-8yrs ago   • Pain 03/2018    RA pain   • Rheumatoid arthritis (HCC)    • Scarlet fever when pt was a child       Social History     Tobacco Use   • Smoking status: Never Smoker   • Smokeless tobacco: Never Used   Vaping Use   • Vaping Use: Never used   Substance Use Topics   • Alcohol use: No     Alcohol/week: 0.0 oz   • Drug use: Yes     Types: Marijuana, Inhaled     Comment: marijuana, CBD PRN (cocaine crank last 1988)       Current Outpatient Medications Ordered in Epic   Medication Sig Dispense Refill   • sulfaSALAzine (AZULFIDINE) 500 MG Tab Take 1,000 mg by mouth.     • levothyroxine (SYNTHROID) 112 MCG Tab Take 1 Tablet by mouth every morning on an empty stomach. 90 Tablet 0   • lisinopril-hydrochlorothiazide (PRINZIDE) 20-12.5 MG per tablet Take 1 Tablet by mouth every day. 90 Tablet 0   • PARoxetine (PAXIL) 10 MG Tab TAKE ONE TABLET BY MOUTH ONCE DAILY 90 tablet 1   • meloxicam (MOBIC) 15 MG tablet      • sulfaSALAzine (AZULFIDINE EN-TAB) 500 MG EC tablet      • SulfADIAZINE 500 MG Tab Take 1,000 mg by mouth 2 Times a Day. 2 pills in am and 2 pills in pm      • Nutritional Supplements (VITAMIN D BOOSTER PO) Take  by mouth.     • acetaminophen (TYLENOL) 500 MG Tab Take 1 Tab by mouth every 6 hours. 60 Tab 1   • XELJANZ 5 MG Tab Take 1 Tab by mouth 2 Times a Day.  1   • B Complex  "Vitamins (VITAMIN B COMPLEX) Tab Take  by mouth.     • folic acid (FOLVITE) 1 MG TABS Take 1 mg by mouth every day.       No current [facility]-ordered facility-administered medications on file.       Allergies:  Patient has no known allergies.    Health Maintenance: Deferred    ROS:  Gen: no fevers/chills, no changes in weight  Eyes: no changes in vision  ENT: no sore throat, no hearing loss, no bloody nose  Pulm: no sob, no cough  CV: no chest pain, no palpitations  GI: no nausea/vomiting, no diarrhea  : no dysuria  MSk: no myalgias  Skin: no rash  Neuro: no headaches, no numbness/tingling  Heme/Lymph: no easy bruising      Objective:       Exam:  /74   Pulse 68   Temp 36.3 °C (97.3 °F)   Resp 16   Ht 1.605 m (5' 3.2\")   Wt 83.9 kg (185 lb)   LMP 12/09/2018   SpO2 94%   BMI 32.56 kg/m²  Body mass index is 32.56 kg/m².    Gen: Alert and oriented, No apparent distress.  Neck: Neck is supple without lymphadenopathy.  No carotid bruits.  Lungs: Normal effort, CTA bilaterally, no wheezes, rhonchi, or rales  CV: Regular rate and rhythm. No murmurs, rubs, or gallops.  Ext: No clubbing, cyanosis, edema.    Labs: 10/25/2021:  TSH 0.23, FT4 1.75.    Assessment & Plan:     56 y.o. female with the following -     1.  Hypothyroid  Chronic condition.  Recent TSH 0.23 and FT4 1.75 on 10/25/2021.  Patient reports she feels well and denies palpitations, heat or cold intolerance, or unexplained weight loss.  Discussed with patient that we will reduce her levothyroxine to 112 mcg daily, and repeat her labs in 6 to 8 weeks.  Patient verbalizes understanding.   - levothyroxine (SYNTHROID) 112 MCG Tab; Take 1 Tablet by mouth every morning on an empty stomach.  Dispense: 90 Tablet; Refill: 0  - TSH WITH REFLEX TO FT4; Future      Return in about 3 months (around 2/11/2022).    I have placed the below orders and discussed them with an approved delegating provider.  The MA is performing the below orders under the direction of " Morelia Walters MD.    Please note that this dictation was created using voice recognition software. I have made every reasonable attempt to correct obvious errors, but I expect that there are errors of grammar and possibly content that I did not discover before finalizing the note.

## 2021-11-21 DIAGNOSIS — N95.9 MENOPAUSAL DISORDER: ICD-10-CM

## 2021-11-22 RX ORDER — PAROXETINE 10 MG/1
TABLET, FILM COATED ORAL
Qty: 90 TABLET | Refills: 0 | Status: SHIPPED | OUTPATIENT
Start: 2021-11-22 | End: 2022-02-17

## 2021-12-28 RX ORDER — LISINOPRIL AND HYDROCHLOROTHIAZIDE 20; 12.5 MG/1; MG/1
1 TABLET ORAL DAILY
Qty: 90 TABLET | Refills: 0 | Status: SHIPPED | OUTPATIENT
Start: 2021-12-28 | End: 2022-04-09

## 2022-01-31 ENCOUNTER — HOSPITAL ENCOUNTER (OUTPATIENT)
Dept: LAB | Facility: MEDICAL CENTER | Age: 57
End: 2022-01-31
Attending: SPECIALIST
Payer: MEDICARE

## 2022-01-31 LAB
25(OH)D3 SERPL-MCNC: 31 NG/ML (ref 30–100)
ALBUMIN SERPL BCP-MCNC: 4.8 G/DL (ref 3.2–4.9)
ALP SERPL-CCNC: 103 U/L (ref 30–99)
ALT SERPL-CCNC: 21 U/L (ref 2–50)
AST SERPL-CCNC: 19 U/L (ref 12–45)
BASOPHILS # BLD AUTO: 0.5 % (ref 0–1.8)
BASOPHILS # BLD: 0.02 K/UL (ref 0–0.12)
BILIRUB CONJ SERPL-MCNC: <0.2 MG/DL (ref 0.1–0.5)
BILIRUB INDIRECT SERPL-MCNC: ABNORMAL MG/DL (ref 0–1)
BILIRUB SERPL-MCNC: 0.6 MG/DL (ref 0.1–1.5)
EOSINOPHIL # BLD AUTO: 0.19 K/UL (ref 0–0.51)
EOSINOPHIL NFR BLD: 5.1 % (ref 0–6.9)
ERYTHROCYTE [DISTWIDTH] IN BLOOD BY AUTOMATED COUNT: 45.4 FL (ref 35.9–50)
EST. AVERAGE GLUCOSE BLD GHB EST-MCNC: 103 MG/DL
HBA1C MFR BLD: 5.2 % (ref 4–5.6)
HCT VFR BLD AUTO: 41.9 % (ref 37–47)
HGB BLD-MCNC: 13.6 G/DL (ref 12–16)
IMM GRANULOCYTES # BLD AUTO: 0.02 K/UL (ref 0–0.11)
IMM GRANULOCYTES NFR BLD AUTO: 0.5 % (ref 0–0.9)
LYMPHOCYTES # BLD AUTO: 0.75 K/UL (ref 1–4.8)
LYMPHOCYTES NFR BLD: 20.3 % (ref 22–41)
MCH RBC QN AUTO: 30.5 PG (ref 27–33)
MCHC RBC AUTO-ENTMCNC: 32.5 G/DL (ref 33.6–35)
MCV RBC AUTO: 93.9 FL (ref 81.4–97.8)
MONOCYTES # BLD AUTO: 0.35 K/UL (ref 0–0.85)
MONOCYTES NFR BLD AUTO: 9.5 % (ref 0–13.4)
NEUTROPHILS # BLD AUTO: 2.37 K/UL (ref 2–7.15)
NEUTROPHILS NFR BLD: 64.1 % (ref 44–72)
NRBC # BLD AUTO: 0 K/UL
NRBC BLD-RTO: 0 /100 WBC
PLATELET # BLD AUTO: 234 K/UL (ref 164–446)
PMV BLD AUTO: 11 FL (ref 9–12.9)
PROT SERPL-MCNC: 7.5 G/DL (ref 6–8.2)
RBC # BLD AUTO: 4.46 M/UL (ref 4.2–5.4)
WBC # BLD AUTO: 3.7 K/UL (ref 4.8–10.8)

## 2022-01-31 PROCEDURE — 83036 HEMOGLOBIN GLYCOSYLATED A1C: CPT

## 2022-01-31 PROCEDURE — 80076 HEPATIC FUNCTION PANEL: CPT

## 2022-01-31 PROCEDURE — 82306 VITAMIN D 25 HYDROXY: CPT

## 2022-01-31 PROCEDURE — 85025 COMPLETE CBC W/AUTO DIFF WBC: CPT

## 2022-01-31 PROCEDURE — 36415 COLL VENOUS BLD VENIPUNCTURE: CPT

## 2022-02-02 DIAGNOSIS — E03.8 OTHER SPECIFIED HYPOTHYROIDISM: ICD-10-CM

## 2022-02-03 RX ORDER — LEVOTHYROXINE SODIUM 112 UG/1
TABLET ORAL
Qty: 90 TABLET | Refills: 0 | Status: SHIPPED | OUTPATIENT
Start: 2022-02-03 | End: 2022-05-25 | Stop reason: SDUPTHER

## 2022-02-17 DIAGNOSIS — N95.9 MENOPAUSAL DISORDER: ICD-10-CM

## 2022-02-17 RX ORDER — PAROXETINE 10 MG/1
TABLET, FILM COATED ORAL
Qty: 90 TABLET | Refills: 0 | Status: SHIPPED | OUTPATIENT
Start: 2022-02-17 | End: 2022-05-10

## 2022-04-09 RX ORDER — LISINOPRIL AND HYDROCHLOROTHIAZIDE 20; 12.5 MG/1; MG/1
TABLET ORAL
Qty: 100 TABLET | Refills: 0 | Status: SHIPPED | OUTPATIENT
Start: 2022-04-09 | End: 2022-07-18

## 2022-04-11 ENCOUNTER — HOSPITAL ENCOUNTER (OUTPATIENT)
Dept: LAB | Facility: MEDICAL CENTER | Age: 57
End: 2022-04-11
Attending: SPECIALIST
Payer: MEDICARE

## 2022-04-11 LAB
ALBUMIN SERPL BCP-MCNC: 4.7 G/DL (ref 3.2–4.9)
ALP SERPL-CCNC: 95 U/L (ref 30–99)
ALT SERPL-CCNC: 23 U/L (ref 2–50)
AST SERPL-CCNC: 20 U/L (ref 12–45)
BASOPHILS # BLD AUTO: 0.7 % (ref 0–1.8)
BASOPHILS # BLD: 0.03 K/UL (ref 0–0.12)
BILIRUB CONJ SERPL-MCNC: <0.2 MG/DL (ref 0.1–0.5)
BILIRUB INDIRECT SERPL-MCNC: NORMAL MG/DL (ref 0–1)
BILIRUB SERPL-MCNC: 0.5 MG/DL (ref 0.1–1.5)
CREAT SERPL-MCNC: 0.75 MG/DL (ref 0.5–1.4)
EOSINOPHIL # BLD AUTO: 0.23 K/UL (ref 0–0.51)
EOSINOPHIL NFR BLD: 5.4 % (ref 0–6.9)
ERYTHROCYTE [DISTWIDTH] IN BLOOD BY AUTOMATED COUNT: 47.4 FL (ref 35.9–50)
GFR SERPLBLD CREATININE-BSD FMLA CKD-EPI: 92 ML/MIN/1.73 M 2
HCT VFR BLD AUTO: 40.3 % (ref 37–47)
HGB BLD-MCNC: 13.4 G/DL (ref 12–16)
IMM GRANULOCYTES # BLD AUTO: 0.02 K/UL (ref 0–0.11)
IMM GRANULOCYTES NFR BLD AUTO: 0.5 % (ref 0–0.9)
LYMPHOCYTES # BLD AUTO: 0.96 K/UL (ref 1–4.8)
LYMPHOCYTES NFR BLD: 22.5 % (ref 22–41)
MCH RBC QN AUTO: 32 PG (ref 27–33)
MCHC RBC AUTO-ENTMCNC: 33.3 G/DL (ref 33.6–35)
MCV RBC AUTO: 96.2 FL (ref 81.4–97.8)
MONOCYTES # BLD AUTO: 0.45 K/UL (ref 0–0.85)
MONOCYTES NFR BLD AUTO: 10.5 % (ref 0–13.4)
NEUTROPHILS # BLD AUTO: 2.58 K/UL (ref 2–7.15)
NEUTROPHILS NFR BLD: 60.4 % (ref 44–72)
NRBC # BLD AUTO: 0 K/UL
NRBC BLD-RTO: 0 /100 WBC
PLATELET # BLD AUTO: 235 K/UL (ref 164–446)
PMV BLD AUTO: 11 FL (ref 9–12.9)
PROT SERPL-MCNC: 7.4 G/DL (ref 6–8.2)
RBC # BLD AUTO: 4.19 M/UL (ref 4.2–5.4)
WBC # BLD AUTO: 4.3 K/UL (ref 4.8–10.8)

## 2022-04-11 PROCEDURE — 80076 HEPATIC FUNCTION PANEL: CPT

## 2022-04-11 PROCEDURE — 85025 COMPLETE CBC W/AUTO DIFF WBC: CPT

## 2022-04-11 PROCEDURE — 36415 COLL VENOUS BLD VENIPUNCTURE: CPT

## 2022-04-11 PROCEDURE — 82565 ASSAY OF CREATININE: CPT

## 2022-05-09 DIAGNOSIS — N95.9 MENOPAUSAL DISORDER: ICD-10-CM

## 2022-05-10 RX ORDER — PAROXETINE 10 MG/1
TABLET, FILM COATED ORAL
Qty: 90 TABLET | Refills: 0 | Status: SHIPPED | OUTPATIENT
Start: 2022-05-10 | End: 2022-08-17

## 2022-05-19 ENCOUNTER — OFFICE VISIT (OUTPATIENT)
Dept: MEDICAL GROUP | Facility: PHYSICIAN GROUP | Age: 57
End: 2022-05-19
Payer: MEDICARE

## 2022-05-19 ENCOUNTER — HOSPITAL ENCOUNTER (OUTPATIENT)
Dept: LAB | Facility: MEDICAL CENTER | Age: 57
End: 2022-05-19
Attending: SPECIALIST
Payer: MEDICARE

## 2022-05-19 VITALS
TEMPERATURE: 97.7 F | OXYGEN SATURATION: 95 % | DIASTOLIC BLOOD PRESSURE: 86 MMHG | WEIGHT: 186 LBS | HEART RATE: 65 BPM | BODY MASS INDEX: 31.76 KG/M2 | HEIGHT: 64 IN | SYSTOLIC BLOOD PRESSURE: 130 MMHG | RESPIRATION RATE: 18 BRPM

## 2022-05-19 DIAGNOSIS — R05.9 COUGH: ICD-10-CM

## 2022-05-19 LAB
ALBUMIN SERPL BCP-MCNC: 4.6 G/DL (ref 3.2–4.9)
ALP SERPL-CCNC: 97 U/L (ref 30–99)
ALT SERPL-CCNC: 24 U/L (ref 2–50)
AST SERPL-CCNC: 21 U/L (ref 12–45)
BASOPHILS # BLD AUTO: 0.6 % (ref 0–1.8)
BASOPHILS # BLD: 0.03 K/UL (ref 0–0.12)
BILIRUB CONJ SERPL-MCNC: <0.2 MG/DL (ref 0.1–0.5)
BILIRUB INDIRECT SERPL-MCNC: NORMAL MG/DL (ref 0–1)
BILIRUB SERPL-MCNC: 0.5 MG/DL (ref 0.1–1.5)
CREAT SERPL-MCNC: 0.8 MG/DL (ref 0.5–1.4)
EOSINOPHIL # BLD AUTO: 0.25 K/UL (ref 0–0.51)
EOSINOPHIL NFR BLD: 4.8 % (ref 0–6.9)
ERYTHROCYTE [DISTWIDTH] IN BLOOD BY AUTOMATED COUNT: 45 FL (ref 35.9–50)
GFR SERPLBLD CREATININE-BSD FMLA CKD-EPI: 85 ML/MIN/1.73 M 2
HCT VFR BLD AUTO: 42.4 % (ref 37–47)
HGB BLD-MCNC: 13.9 G/DL (ref 12–16)
IMM GRANULOCYTES # BLD AUTO: 0.03 K/UL (ref 0–0.11)
IMM GRANULOCYTES NFR BLD AUTO: 0.6 % (ref 0–0.9)
LYMPHOCYTES # BLD AUTO: 0.95 K/UL (ref 1–4.8)
LYMPHOCYTES NFR BLD: 18.3 % (ref 22–41)
MCH RBC QN AUTO: 31.4 PG (ref 27–33)
MCHC RBC AUTO-ENTMCNC: 32.8 G/DL (ref 33.6–35)
MCV RBC AUTO: 95.7 FL (ref 81.4–97.8)
MONOCYTES # BLD AUTO: 0.47 K/UL (ref 0–0.85)
MONOCYTES NFR BLD AUTO: 9.1 % (ref 0–13.4)
NEUTROPHILS # BLD AUTO: 3.45 K/UL (ref 2–7.15)
NEUTROPHILS NFR BLD: 66.6 % (ref 44–72)
NRBC # BLD AUTO: 0 K/UL
NRBC BLD-RTO: 0 /100 WBC
PLATELET # BLD AUTO: 264 K/UL (ref 164–446)
PMV BLD AUTO: 10.7 FL (ref 9–12.9)
PROT SERPL-MCNC: 7.4 G/DL (ref 6–8.2)
RBC # BLD AUTO: 4.43 M/UL (ref 4.2–5.4)
WBC # BLD AUTO: 5.2 K/UL (ref 4.8–10.8)

## 2022-05-19 PROCEDURE — 36415 COLL VENOUS BLD VENIPUNCTURE: CPT

## 2022-05-19 PROCEDURE — 85025 COMPLETE CBC W/AUTO DIFF WBC: CPT

## 2022-05-19 PROCEDURE — 99214 OFFICE O/P EST MOD 30 MIN: CPT | Performed by: NURSE PRACTITIONER

## 2022-05-19 PROCEDURE — 82565 ASSAY OF CREATININE: CPT

## 2022-05-19 PROCEDURE — 80076 HEPATIC FUNCTION PANEL: CPT

## 2022-05-19 RX ORDER — BENZONATATE 100 MG/1
100 CAPSULE ORAL 3 TIMES DAILY PRN
Qty: 60 CAPSULE | Refills: 0 | Status: SHIPPED
Start: 2022-05-19 | End: 2022-07-15

## 2022-05-19 RX ORDER — METHYLPREDNISOLONE 4 MG/1
TABLET ORAL
Qty: 21 TABLET | Refills: 0 | Status: SHIPPED
Start: 2022-05-19 | End: 2022-07-15

## 2022-05-19 ASSESSMENT — PATIENT HEALTH QUESTIONNAIRE - PHQ9: CLINICAL INTERPRETATION OF PHQ2 SCORE: 0

## 2022-05-19 ASSESSMENT — FIBROSIS 4 INDEX: FIB4 SCORE: 1.03

## 2022-05-19 NOTE — PROGRESS NOTES
Subjective  Chief Complaint  Cough    History of Present Illness  Abdias Viera is a 57 y.o. female. This established patient is here today to discuss her cough.    Her PCP is KATIE Ratliff    Cough  Acute and ongoing. She states that she got sick a few weeks ago. She has been having a cough that keeps lingering. She is concerned about it with her medical history. She states she took a COVID test at home and she was negative. She has tried several OTC medications but the cough continues. Denies fever or chills.    Past Medical History    Allergies: Patient has no known allergies.  Past Medical History:   Diagnosis Date   • Anemia     H/O with end of menopause   • Anxiety disorder    • Arthritis     RA   • Asthma     uses inhalers as needed   • Heart burn    • Heart murmur    • High cholesterol     no meds   • Hypertension 9/22/2010   • Hypothyroid 5/27/2010   • MRSA (methicillin resistant Staphylococcus aureus)     Hx of MRSA (when taking Enbrel) 7-8yrs ago   • Pain 03/2018    RA pain   • Rheumatoid arthritis (HCC)    • Scarlet fever when pt was a child     Past Surgical History:   Procedure Laterality Date   • [place] TOTAL HIP ARTHROPLASTY Left 6/26/2019    Procedure: ARTHROPLASTY, HIP, TOTAL;  Surgeon: Janet Davis M.D.;  Location: SURGERY [facility];  Service: Orthopedics   • HIP ARTHROPLASTY TOTAL Right 1/30/2019    Procedure: HIP ARTHROPLASTY TOTAL;  Surgeon: Janet Davis M.D.;  Location: [facility];  Service: Orthopedics   • HYSTEROSCOPY WITH VIDEO DIAGNOSTIC N/A 3/30/2018    Procedure: HYSTEROSCOPY WITH VIDEO DIAGNOSTIC;  Surgeon: Tamera Arias M.D.;  Location: SURGERY SAME DAY [facility];  Service: Obstetrics   • PELVIC EXAM UNDER ANESTHESIA N/A 3/30/2018    Procedure: PELVIC EXAM UNDER ANESTHESIA;  Surgeon: Tamera Arias M.D.;  Location: SURGERY SAME DAY [facility];  Service: Obstetrics   • DILATION AND CURETTAGE N/A 3/30/2018     Procedure: DILATION AND CURETTAGE;  Surgeon: Tamera Arias M.D.;  Location: SURGERY [facility] DAY [facility];  Service: Obstetrics   • TUMOR EXCISION WITH BIOPSY  2005    R thigh; benign   • OTHER ORTHOPEDIC SURGERY Left 1990    L ankle cyst   • OTHER      repairs from going thru sliding glass at age 4   • OTHER ORTHOPEDIC SURGERY Left     left hip bone scrape   • OTHER ORTHOPEDIC SURGERY Right     Rt leg benign tumor     Current Outpatient Medications Ordered in Epic   Medication Sig Dispense Refill   • methylPREDNISolone (MEDROL DOSEPAK) 4 MG Tablet Therapy Pack As directed on the packaging label. 21 Tablet 0   • benzonatate (TESSALON) 100 MG Cap Take 1 Capsule by mouth 3 times a day as needed for Cough. 60 Capsule 0   • PARoxetine (PAXIL) 10 MG Tab TAKE ONE TABLET BY MOUTH ONCE DAILY 90 Tablet 0   • lisinopril-hydrochlorothiazide (PRINZIDE) 20-12.5 MG per tablet TAKE ONE TABLET BY MOUTH ONCE DAILY 100 Tablet 0   • levothyroxine (SYNTHROID) 112 MCG Tab TAKE ONE TABLET BY MOUTH EVERY MORNING  on an empty stomach 90 Tablet 0   • sulfaSALAzine (AZULFIDINE) 500 MG Tab Take 1,000 mg by mouth.     • meloxicam (MOBIC) 15 MG tablet      • sulfaSALAzine (AZULFIDINE EN-TAB) 500 MG EC tablet      • SulfADIAZINE 500 MG Tab Take 1,000 mg by mouth 2 Times a Day. 2 pills in am and 2 pills in pm      • Nutritional Supplements (VITAMIN D BOOSTER PO) Take  by mouth.     • acetaminophen (TYLENOL) 500 MG Tab Take 1 Tab by mouth every 6 hours. 60 Tab 1   • XELJANZ 5 MG Tab Take 1 Tab by mouth 2 Times a Day.  1   • B Complex Vitamins (VITAMIN B COMPLEX) Tab Take  by mouth.     • folic acid (FOLVITE) 1 MG TABS Take 1 mg by mouth every day.       No current [facility]-ordered facility-administered medications on file.     Family History:    Family History   Problem Relation Age of Onset   • Hypertension Mother    • Thyroid Mother    • Heart Disease Mother         Heart Attack   • Heart Disease Maternal Grandmother    • Heart  "Disease Maternal Grandfather    • No Known Problems Brother    • Diabetes Neg Hx    • Cancer Neg Hx       Personal/Social History:    Social History     Tobacco Use   • Smoking status: Never Smoker   • Smokeless tobacco: Never Used   Vaping Use   • Vaping Use: Never used   Substance Use Topics   • Alcohol use: Not Currently   • Drug use: Yes     Types: Marijuana, Inhaled     Comment: marijuana, CBD PRN (cocaine crank last 1988)     Social History     Social History Narrative   • Not on file      Review of Systems:   General: Negative for fever/chills and unexpected weight change.   Eyes:  Negative for vision changes, eye pain.  ENT:  Negative for hearing changes, ear pain, congestion, sore throat, and neck pain.   Respiratory:  Negative for dyspnea. Positive for cough.  Cardiovascular:  Negative for chest pain and palpitations.  Musculoskeletal:  Negative for myalgias.   Skin:  Negative for rash.     Objective  Physical Exam:   /86 (BP Location: Left arm, Patient Position: Sitting, BP Cuff Size: Adult)   Pulse 65   Temp 36.5 °C (97.7 °F) (Temporal)   Resp 18   Ht 1.613 m (5' 3.5\")   Wt 84.4 kg (186 lb)   SpO2 95%  Body mass index is 32.43 kg/m².  General:  Alert and oriented.  Well appearing.  NAD  Neck: Supple without JVD. No lymphadenopathy.  Pulmonary:  Normal effort.  Clear to ausculation without rales, ronchi, or wheezing.  Cardiovascular:  Regular rate and rhythm without murmur, rubs or gallop.   Skin:  Warm and dry.  No obvious lesions.  Musculoskeletal:  No extremity cyanosis, clubbing, or edema.      Assessment/Plan  1. Cough  Acute and ongoing.  Discussed with her that at this time she does not need an x-ray of her chest unless she wants one, she would like to wait before getting an x-ray.  Discussed Medrol Dosepak risks, benefits and side effects, she verbalized understanding.  Discussed Tessalon risks, benefits and side effects, she verbalized understanding.  - methylPREDNISolone (MEDROL " DOSEPAK) 4 MG Tablet Therapy Pack; As directed on the packaging label.  Dispense: 21 Tablet; Refill: 0  - benzonatate (TESSALON) 100 MG Cap; Take 1 Capsule by mouth 3 times a day as needed for Cough.  Dispense: 60 Capsule; Refill: 0        Return if symptoms worsen or fail to improve.    Please note that this dictation was created using voice recognition software. I have made every reasonable attempt to correct obvious errors, but I expect that there are errors of grammar and possibly content that I did not discover before finalizing the note.    KATIE Domínguez  [facility]

## 2022-05-19 NOTE — ASSESSMENT & PLAN NOTE
Acute and ongoing. She states that she got sick a few weeks ago. She has been having a cough that keeps lingering. She is concerned about it with her medical history. She states she took a COVID test at home and she was negative. She has tried several OTC medications but the cough continues. Denies fever or chills.

## 2022-05-25 DIAGNOSIS — E03.8 OTHER SPECIFIED HYPOTHYROIDISM: ICD-10-CM

## 2022-05-25 RX ORDER — LEVOTHYROXINE SODIUM 112 UG/1
112 TABLET ORAL
Qty: 90 TABLET | Refills: 0 | Status: SHIPPED | OUTPATIENT
Start: 2022-05-25 | End: 2022-08-18

## 2022-06-08 ENCOUNTER — TELEPHONE (OUTPATIENT)
Dept: HEALTH INFORMATION MANAGEMENT | Facility: OTHER | Age: 57
End: 2022-06-08

## 2022-07-14 NOTE — PROGRESS NOTES
Subjective:     CC:   Chief Complaint   Patient presents with   • Requesting Labs         HPI:   Abdias presents today to discuss the following issues:    Olecranon bursitis of left elbow  The patient states she hit her elbow went camping and it is now swollen and painful.  She reports it is improving.    Acquired hypothyroidism  Labs from 10/25/2021 showed a reduced TSH of 0.23 and an elevated free T4 1.75.  The patient had her levothyroxine reduced from 125 mcg daily to 112 mcg daily following these labs.  She has not obtained follow-up labs.      Past Medical History:   Diagnosis Date   • Anemia     H/O with end of menopause   • Anxiety disorder    • Arthritis     RA   • Asthma     uses inhalers as needed   • Heart burn    • Heart murmur    • High cholesterol     no meds   • Hypertension 9/22/2010   • Hypothyroid 5/27/2010   • MRSA (methicillin resistant Staphylococcus aureus)     Hx of MRSA (when taking Enbrel) 7-8yrs ago   • Pain 03/2018    RA pain   • Rheumatoid arthritis (HCC)    • Scarlet fever when pt was a child       Social History     Tobacco Use   • Smoking status: Never Smoker   • Smokeless tobacco: Never Used   Vaping Use   • Vaping Use: Never used   Substance Use Topics   • Alcohol use: Not Currently   • Drug use: Yes     Types: Marijuana, Inhaled     Comment: marijuana, CBD PRN (cocaine crank last 1988)       Current Outpatient Medications Ordered in Epic   Medication Sig Dispense Refill   • levothyroxine (SYNTHROID) 112 MCG Tab Take 1 Tablet by mouth every morning on an empty stomach. 90 Tablet 0   • PARoxetine (PAXIL) 10 MG Tab TAKE ONE TABLET BY MOUTH ONCE DAILY 90 Tablet 0   • lisinopril-hydrochlorothiazide (PRINZIDE) 20-12.5 MG per tablet TAKE ONE TABLET BY MOUTH ONCE DAILY 100 Tablet 0   • meloxicam (MOBIC) 15 MG tablet      • SulfADIAZINE 500 MG Tab Take 1,000 mg by mouth 2 Times a Day. 2 pills in am and 2 pills in pm      • Nutritional Supplements (VITAMIN D BOOSTER PO) Take  by mouth.     •  "acetaminophen (TYLENOL) 500 MG Tab Take 1 Tab by mouth every 6 hours. 60 Tab 1   • XELJANZ 5 MG Tab Take 1 Tab by mouth 2 Times a Day.  1   • B Complex Vitamins (VITAMIN B COMPLEX) Tab Take  by mouth.     • folic acid (FOLVITE) 1 MG TABS Take 1 mg by mouth every day.     • sulfaSALAzine (AZULFIDINE) 500 MG Tab Take 1,000 mg by mouth. (Patient not taking: Reported on 7/15/2022)     • sulfaSALAzine (AZULFIDINE EN-TAB) 500 MG EC tablet  (Patient not taking: Reported on 7/15/2022)       No current [facility]-ordered facility-administered medications on file.       Allergies:  Patient has no known allergies.    Health Maintenance: Completed    ROS:   Denies any recent fevers or chills. No nausea or vomiting. No chest pains or shortness of breath.      Objective:       Exam:  /68 (BP Location: Right arm, Patient Position: Sitting, BP Cuff Size: Adult)   Pulse 71   Temp 36.9 °C (98.4 °F) (Temporal)   Resp 18   Ht 1.626 m (5' 4\")   Wt 84.3 kg (185 lb 14.4 oz)   LMP 12/09/2018   SpO2 96%   BMI 31.91 kg/m²  Body mass index is 31.91 kg/m².    Gen: Alert and oriented, No apparent distress.  Pulmonary: Clear to ausculation.  Normal effort. No rales, ronchi, or wheezing.  Cardiovascular: Regular rate and rhythm without murmur.   Ext: Pain and swelling over left elbow, no erythema      Assessment & Plan:     57 y.o. female with the following -     Olecranon bursitis of left elbow  Acute medical condition.  The patient states she hit her elbow went camping and it is now swollen and painful.  Exam consistent with olecranon bursitis.  Recommended conservative treatment with elbow protection, rest, ice, compression, and elevation.  Also advised meloxicam 15 mg daily on an as-needed basis, the patient already has a prescription for this medication.  If the patient swelling does not resolve, she may benefit from a corticosteroid injection or a short course of oral corticosteroids.    Acquired hypothyroidism  Chronic medical " condition.  Labs from 10/25/2021 showed a reduced TSH of 0.23 and an elevated free T4 1.75.  The patient had her levothyroxine reduced from 125 mcg daily to 112 mcg daily following these labs.  She has not obtained follow-up labs.  - FREE THYROXINE; Future  - TSH; Future    Primary hypertension  Chronic medical condition.  Blood pressure at goal of <130/80.  Continue current regimen of lisinopril-HCTZ 20-12.5 mg daily.      Return if symptoms worsen or fail to improve.    Please note that this dictation was created using voice recognition software. I have made every reasonable attempt to correct obvious errors, but I expect that there are errors of grammar and possibly content that I did not discover before finalizing the note.

## 2022-07-15 ENCOUNTER — OFFICE VISIT (OUTPATIENT)
Dept: MEDICAL GROUP | Facility: PHYSICIAN GROUP | Age: 57
End: 2022-07-15
Payer: MEDICARE

## 2022-07-15 VITALS
TEMPERATURE: 98.4 F | HEIGHT: 64 IN | BODY MASS INDEX: 31.74 KG/M2 | WEIGHT: 185.9 LBS | DIASTOLIC BLOOD PRESSURE: 68 MMHG | SYSTOLIC BLOOD PRESSURE: 120 MMHG | RESPIRATION RATE: 18 BRPM | HEART RATE: 71 BPM | OXYGEN SATURATION: 96 %

## 2022-07-15 DIAGNOSIS — E03.9 ACQUIRED HYPOTHYROIDISM: ICD-10-CM

## 2022-07-15 DIAGNOSIS — M70.22 OLECRANON BURSITIS OF LEFT ELBOW: ICD-10-CM

## 2022-07-15 PROCEDURE — 99214 OFFICE O/P EST MOD 30 MIN: CPT | Performed by: INTERNAL MEDICINE

## 2022-07-15 ASSESSMENT — FIBROSIS 4 INDEX: FIB4 SCORE: 0.94

## 2022-07-18 RX ORDER — LISINOPRIL AND HYDROCHLOROTHIAZIDE 20; 12.5 MG/1; MG/1
TABLET ORAL
Qty: 100 TABLET | Refills: 0 | Status: SHIPPED | OUTPATIENT
Start: 2022-07-18 | End: 2022-11-16

## 2022-08-16 DIAGNOSIS — N95.9 MENOPAUSAL DISORDER: ICD-10-CM

## 2022-08-17 DIAGNOSIS — E03.8 OTHER SPECIFIED HYPOTHYROIDISM: ICD-10-CM

## 2022-08-17 RX ORDER — PAROXETINE 10 MG/1
TABLET, FILM COATED ORAL
Qty: 90 TABLET | Refills: 0 | Status: SHIPPED
Start: 2022-08-17 | End: 2022-08-30

## 2022-08-18 RX ORDER — LEVOTHYROXINE SODIUM 112 UG/1
TABLET ORAL
Qty: 90 TABLET | Refills: 0 | Status: SHIPPED | OUTPATIENT
Start: 2022-08-18 | End: 2022-11-02

## 2022-08-18 NOTE — TELEPHONE ENCOUNTER
Received request via: Pharmacy    Was the patient seen in the last year in this department? Yes    Does the patient have an active prescription (recently filled or refills available) for medication(s) requested? No    
170.18

## 2022-08-23 ENCOUNTER — HOSPITAL ENCOUNTER (OUTPATIENT)
Dept: LAB | Facility: MEDICAL CENTER | Age: 57
End: 2022-08-23
Attending: SPECIALIST
Payer: MEDICARE

## 2022-08-23 DIAGNOSIS — E03.9 ACQUIRED HYPOTHYROIDISM: ICD-10-CM

## 2022-08-23 LAB
ALBUMIN SERPL BCP-MCNC: 4.8 G/DL (ref 3.2–4.9)
ALP SERPL-CCNC: 93 U/L (ref 30–99)
ALT SERPL-CCNC: 21 U/L (ref 2–50)
AST SERPL-CCNC: 22 U/L (ref 12–45)
BASOPHILS # BLD AUTO: 0.4 % (ref 0–1.8)
BASOPHILS # BLD: 0.02 K/UL (ref 0–0.12)
BILIRUB CONJ SERPL-MCNC: <0.2 MG/DL (ref 0.1–0.5)
BILIRUB INDIRECT SERPL-MCNC: NORMAL MG/DL (ref 0–1)
BILIRUB SERPL-MCNC: 0.5 MG/DL (ref 0.1–1.5)
EOSINOPHIL # BLD AUTO: 0.23 K/UL (ref 0–0.51)
EOSINOPHIL NFR BLD: 4.7 % (ref 0–6.9)
ERYTHROCYTE [DISTWIDTH] IN BLOOD BY AUTOMATED COUNT: 47.2 FL (ref 35.9–50)
HCT VFR BLD AUTO: 42.1 % (ref 37–47)
HGB BLD-MCNC: 13.9 G/DL (ref 12–16)
IMM GRANULOCYTES # BLD AUTO: 0.02 K/UL (ref 0–0.11)
IMM GRANULOCYTES NFR BLD AUTO: 0.4 % (ref 0–0.9)
LYMPHOCYTES # BLD AUTO: 0.91 K/UL (ref 1–4.8)
LYMPHOCYTES NFR BLD: 18.6 % (ref 22–41)
MCH RBC QN AUTO: 31.8 PG (ref 27–33)
MCHC RBC AUTO-ENTMCNC: 33 G/DL (ref 33.6–35)
MCV RBC AUTO: 96.3 FL (ref 81.4–97.8)
MONOCYTES # BLD AUTO: 0.45 K/UL (ref 0–0.85)
MONOCYTES NFR BLD AUTO: 9.2 % (ref 0–13.4)
NEUTROPHILS # BLD AUTO: 3.25 K/UL (ref 2–7.15)
NEUTROPHILS NFR BLD: 66.7 % (ref 44–72)
NRBC # BLD AUTO: 0 K/UL
NRBC BLD-RTO: 0 /100 WBC
PLATELET # BLD AUTO: 269 K/UL (ref 164–446)
PMV BLD AUTO: 10.8 FL (ref 9–12.9)
PROT SERPL-MCNC: 7.8 G/DL (ref 6–8.2)
RBC # BLD AUTO: 4.37 M/UL (ref 4.2–5.4)
T4 FREE SERPL-MCNC: 1.52 NG/DL (ref 0.93–1.7)
TSH SERPL DL<=0.005 MIU/L-ACNC: 0.59 UIU/ML (ref 0.38–5.33)
WBC # BLD AUTO: 4.9 K/UL (ref 4.8–10.8)

## 2022-08-23 PROCEDURE — 84443 ASSAY THYROID STIM HORMONE: CPT

## 2022-08-23 PROCEDURE — 84439 ASSAY OF FREE THYROXINE: CPT

## 2022-08-23 PROCEDURE — 80076 HEPATIC FUNCTION PANEL: CPT

## 2022-08-23 PROCEDURE — 36415 COLL VENOUS BLD VENIPUNCTURE: CPT

## 2022-08-23 PROCEDURE — 85025 COMPLETE CBC W/AUTO DIFF WBC: CPT

## 2022-08-30 ENCOUNTER — OFFICE VISIT (OUTPATIENT)
Dept: MEDICAL GROUP | Facility: PHYSICIAN GROUP | Age: 57
End: 2022-08-30
Payer: MEDICARE

## 2022-08-30 VITALS
WEIGHT: 185 LBS | TEMPERATURE: 99.1 F | OXYGEN SATURATION: 92 % | SYSTOLIC BLOOD PRESSURE: 100 MMHG | HEART RATE: 93 BPM | DIASTOLIC BLOOD PRESSURE: 64 MMHG | RESPIRATION RATE: 16 BRPM | HEIGHT: 64 IN | BODY MASS INDEX: 31.58 KG/M2

## 2022-08-30 DIAGNOSIS — Z12.4 CERVICAL CANCER SCREENING: ICD-10-CM

## 2022-08-30 DIAGNOSIS — Z12.31 ENCOUNTER FOR SCREENING MAMMOGRAM FOR MALIGNANT NEOPLASM OF BREAST: ICD-10-CM

## 2022-08-30 DIAGNOSIS — F41.9 ANXIETY: ICD-10-CM

## 2022-08-30 DIAGNOSIS — E66.9 OBESITY (BMI 30-39.9): ICD-10-CM

## 2022-08-30 PROCEDURE — G0438 PPPS, INITIAL VISIT: HCPCS | Performed by: NURSE PRACTITIONER

## 2022-08-30 RX ORDER — PAROXETINE HYDROCHLORIDE 20 MG/1
20 TABLET, FILM COATED ORAL DAILY
Qty: 100 TABLET | Refills: 2 | Status: SHIPPED | OUTPATIENT
Start: 2022-08-30 | End: 2023-10-02

## 2022-08-30 RX ORDER — SULFASALAZINE 500 MG/1
1000 TABLET ORAL 2 TIMES DAILY
COMMUNITY
Start: 2022-08-01

## 2022-08-30 ASSESSMENT — ENCOUNTER SYMPTOMS: GENERAL WELL-BEING: GOOD

## 2022-08-30 ASSESSMENT — ACTIVITIES OF DAILY LIVING (ADL): BATHING_REQUIRES_ASSISTANCE: 0

## 2022-08-30 ASSESSMENT — PATIENT HEALTH QUESTIONNAIRE - PHQ9: CLINICAL INTERPRETATION OF PHQ2 SCORE: 0

## 2022-08-30 ASSESSMENT — FIBROSIS 4 INDEX: FIB4 SCORE: 1.04

## 2022-08-30 NOTE — PROGRESS NOTES
Chief Complaint   Patient presents with    Annual Wellness Visit    Other     Go over medications       HPI:  Abdias Viera is a 57 y.o. 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      PARoxetine (PAXIL) 20 MG Tab Take 1 Tablet by mouth every day. 100 Tablet 2    levothyroxine (SYNTHROID) 112 MCG Tab TAKE 1 TABLET BY MOUTH IN THE MORNING ON AN EMPTY STOMACH. 90 Tablet 0    lisinopril-hydrochlorothiazide (PRINZIDE) 20-12.5 MG per tablet TAKE ONE TABLET BY MOUTH ONCE DAILY 100 Tablet 0    meloxicam (MOBIC) 15 MG tablet       Nutritional Supplements (VITAMIN D BOOSTER PO) Take  by mouth.      XELJANZ 5 MG Tab Take 1 Tab by mouth 2 Times a Day.  1    B Complex Vitamins (VITAMIN B COMPLEX) Tab Take  by mouth.      folic acid (FOLVITE) 1 MG TABS Take 1 mg by mouth every day.       No current facility-administered medications for this visit.          Current supplements as per medication list.     Allergies: Patient has no known allergies.    Current social contact/activities: Camping, dogs, family activities.       She  reports that she has never smoked. She has never used smokeless tobacco. She reports that she does not currently use alcohol. She reports current drug use. Drugs: Marijuana and Inhaled.  Counseling given: Not Answered      DPA/Advanced Directive:  Patient has Advanced Directive on file.     ROS:    Gait: Uses no  assistive device  Ostomy: No  Other tubes: No  Amputations: No  Chronic oxygen use: No  Last eye exam: 05/2022  Wears hearing aids: No   : Denies any urinary leakage during the last 6 months    Screening:  Depression Screening  Little interest or pleasure in doing things?  0 - not at all  Feeling down, depressed , or hopeless? 0 - not at all  Patient Health Questionnaire Score: 0     If depressive symptoms identified deferred to follow up visit unless specifically addressed in assessment and plan.    Interpretation of PHQ-9 Total Score   Score Severity   1-4 No Depression   5-9 Mild Depression   10-14 Moderate Depression   15-19 Moderately Severe Depression   20-27 Severe Depression    Screening for Cognitive Impairment  Three Minute Recall (daughter, heaven, judie) 2/3    Jasbir clock face with all 12 numbers and set the hands to show 10 past 11.  Yes    Cognitive concerns identified deferred for follow up unless specifically addressed in assessment and plan.    Fall Risk Assessment  Has the patient had two or more falls in the last year or any fall with injury in the last year?  No    Safety Assessment  Throw rugs on floor.  No  Handrails on all stairs.  Yes  Good lighting in all hallways.  Yes  Difficulty hearing.  No  Patient counseled about all safety risks that were identified.    Functional Assessment ADLs  Are there any barriers preventing you from cooking for yourself or meeting nutritional needs?  No.    Are there any barriers preventing you from driving safely or obtaining transportation?  No.    Are there any barriers preventing you from using a telephone or calling for help?  No.    Are there any barriers preventing you from shopping?  No.    Are there any barriers preventing you from taking care of your own finances?  No.    Are there any barriers preventing you from managing your medications?  No.    Are there any barriers preventing you from showering, bathing or dressing yourself?  No.    Are you  currently engaging in any exercise or physical activity?  Yes.     What is your perception of your health?  Good.      Health Maintenance Summary            Overdue - Annual Wellness Visit (Once) Overdue - never done      No completion history exists for this topic.              Overdue - HEPATITIS C SCREENING (Once) Overdue - never done      No completion history exists for this topic.              Overdue - PAP SMEAR (Every 3 Years) Overdue since 7/27/2020 07/27/2017  PAP IG (IMAGE GUIDED)     05/15/2012  Done             Ordered - MAMMOGRAM (Yearly) Ordered on 8/30/2022 06/06/2020  MA-SCREENING MAMMO BILAT W/TOMOSYNTHESIS W/CAD     12/06/2016  FOREIGN FILM MAMMOGRAPHY     12/05/2016  MA-SCREEN MAMMO W/CAD-BILAT     04/01/2015  Done             Overdue - COVID-19 Vaccine (4 - Booster for Pfizer series) Overdue since 3/5/2022      11/05/2021  Imm Admin: PFIZER PURPLE CAP SARS-COV-2 VACCINATION (12+)     05/04/2021  Imm Admin: PFIZER PURPLE CAP SARS-COV-2 VACCINATION (12+)     04/11/2021  Imm Admin: PFIZER PURPLE CAP SARS-COV-2 VACCINATION (12+)             IMM INFLUENZA (1) Due soon on 9/1/2022      12/15/2021  Imm Admin: Influenza Vaccine Quad Inj (Pf)     10/12/2020  Imm Admin: Influenza Vaccine Quad Inj (Pf)     10/10/2019  Imm Admin: Influenza Vaccine Quad Inj (Pf)     10/10/2019  Imm Admin: Influenza, Unspecified - HISTORICAL DATA     11/15/2018  Imm Admin: Influenza Vaccine Quad Inj (Preserved)     Only the first 5 history entries have been loaded, but more history exists.            Postponed - IMM ZOSTER VACCINES (1 of 2) Postponed until 1/30/2023      No completion history exists for this topic.              COLORECTAL CANCER SCREENING (COLONOSCOPY - Every 10 Years) Next due on 6/16/2025 06/16/2015  COLONOSCOPY (Patient Declined)             IMM DTaP/Tdap/Td Vaccine (2 - Td or Tdap) Next due on 6/11/2030 06/11/2020  Imm Admin: Tdap Vaccine             IMM MENINGOCOCCAL ACWY VACCINE  (Series Information) Aged Out      No completion history exists for this topic.              IMM PNEUMOCOCCAL VACCINE: 0-64 Years (Series Information) Aged Out      No completion history exists for this topic.              Discontinued - IMM HEP B VACCINE  Discontinued      No completion history exists for this topic.                    Patient Care Team:  JONATHAN Capone as PCP - General (Nurse Practitioner Family)  JONATHAN Capone as PCP - [facility] Paneled  Christianne Kan M.D. as Consulting Physician (Rheumatology)  Mary Beth Peters O.D. as Consulting Physician (Optometry)  Cole Reyna Ass't (Inactive) as          Social History     Tobacco Use    Smoking status: Never    Smokeless tobacco: Never   Vaping Use    Vaping Use: Never used   Substance Use Topics    Alcohol use: Not Currently    Drug use: Yes     Types: Marijuana, Inhaled     Comment: marijuana, CBD PRN (cocaine crank last 1988)     Family History   Problem Relation Age of Onset    Hypertension Mother     Thyroid Mother     Heart Disease Mother         Heart Attack    Heart Disease Maternal Grandmother     Heart Disease Maternal Grandfather     No Known Problems Brother     Diabetes Neg Hx     Cancer Neg Hx      She  has a past medical history of Anemia, Anxiety disorder, Arthritis, Asthma, Heart burn, Heart murmur, High cholesterol, Hypertension (9/22/2010), Hypothyroid (5/27/2010), MRSA (methicillin resistant Staphylococcus aureus), Pain (03/2018), Rheumatoid arthritis (HCC), and Scarlet fever (when pt was a child).   Past Surgical History:   Procedure Laterality Date    [place] TOTAL HIP ARTHROPLASTY Left 6/26/2019    Procedure: ARTHROPLASTY, HIP, TOTAL;  Surgeon: Janet Davis M.D.;  Location: [facility];  Service: Orthopedics    HIP ARTHROPLASTY TOTAL Right 1/30/2019    Procedure: HIP ARTHROPLASTY TOTAL;  Surgeon: Janet Davis M.D.;  Location: [facility];   "Service: Orthopedics    HYSTEROSCOPY WITH VIDEO DIAGNOSTIC N/A 3/30/2018    Procedure: HYSTEROSCOPY WITH VIDEO DIAGNOSTIC;  Surgeon: Tamera Arias M.D.;  Location: SURGERY SAME DAY [facility];  Service: Obstetrics    PELVIC EXAM UNDER ANESTHESIA N/A 3/30/2018    Procedure: PELVIC EXAM UNDER ANESTHESIA;  Surgeon: Tamera Arias M.D.;  Location: SURGERY SAME DAY [facility];  Service: Obstetrics    DILATION AND CURETTAGE N/A 3/30/2018    Procedure: DILATION AND CURETTAGE;  Surgeon: Tamera Arias M.D.;  Location: SURGERY SAME DAY [facility];  Service: Obstetrics    TUMOR EXCISION WITH BIOPSY  2005    R thigh; benign    OTHER ORTHOPEDIC SURGERY Left 1990    L ankle cyst    OTHER      repairs from going thru sliding glass at age 4    OTHER ORTHOPEDIC SURGERY Left     left hip bone scrape    OTHER ORTHOPEDIC SURGERY Right     Rt leg benign tumor       Exam:   /64 (BP Location: Left arm, Patient Position: Sitting, BP Cuff Size: Adult)   Pulse 93   Temp 37.3 °C (99.1 °F) (Temporal)   Resp 16   Ht 1.626 m (5' 4\")   Wt 83.9 kg (185 lb)   SpO2 92%  Body mass index is 31.76 kg/m².    Hearing good.    Dentition fair  Alert, oriented in no acute distress.  Eye contact is good, speech goal directed, affect calm    Assessment and Plan. The following treatment and monitoring plan is recommended:    1. Obesity (BMI 30-39.9)  - Patient identified as having weight management issue.  Appropriate orders and counseling given.    2. Anxiety  Chronic condition, not well controlled. Patient reports that her anxiety has been up and down lately. She also states that if she does use marijuana it is much worse.  She has been on paroxetine 10 mg for many years.  Discussed increasing paroxetine to 20 mg daily at this time.  She would like to try this. She will return in one month to see how she is doing.   - PARoxetine (PAXIL) 20 MG Tab; Take 1 Tablet by mouth every day.  Dispense: " 100 Tablet; Refill: 2    3. Encounter for screening mammogram for malignant neoplasm of breast  - MA-SCREENING MAMMO BILAT W/TOMOSYNTHESIS W/CAD; Future    4. Cervical cancer screening  - Referral to Gynecology    Other orders  - sulfaSALAzine (AZULFIDINE) 500 MG Tab; Take 1,000 mg by mouth 2 times a day.    Services suggested: No services needed at this time  Health Care Screening: Age-appropriate preventive services recommended by USPTF and ACIP covered by Medicare were discussed today. Services ordered if indicated and agreed upon by the patient.  Referrals offered: Community-based lifestyle interventions to reduce health risks and promote self-management and wellness, fall prevention, nutrition, physical activity, tobacco-use cessation, weight loss, and mental health services as per orders if indicated.    Discussion today about general wellness and lifestyle habits:    Prevent falls and reduce trip hazards; Cautioned about securing or removing rugs.  Have a working fire alarm and carbon monoxide detector;   Engage in regular physical activity and social activities     Follow-up: Return in about 4 weeks (around 9/27/2022).

## 2022-08-31 NOTE — ASSESSMENT & PLAN NOTE
Chronic condition, not well controlled. Patient reports that her anxiety has been up and down lately. She also states that if she does use marijuana it is much worse.  She has been on paroxetine 10 mg for many years.  Discussed increasing paroxetine to 20 mg daily at this time.  She would like to try this. She will return in one month to see how she is doing.

## 2022-10-24 ENCOUNTER — DOCUMENTATION (OUTPATIENT)
Dept: HEALTH INFORMATION MANAGEMENT | Facility: OTHER | Age: 57
End: 2022-10-24
Payer: MEDICARE

## 2022-12-30 ENCOUNTER — HOSPITAL ENCOUNTER (OUTPATIENT)
Dept: LAB | Facility: MEDICAL CENTER | Age: 57
End: 2022-12-30
Attending: INTERNAL MEDICINE
Payer: MEDICARE

## 2022-12-30 LAB
ALBUMIN SERPL BCP-MCNC: 4.2 G/DL (ref 3.2–4.9)
ALT SERPL-CCNC: 19 U/L (ref 2–50)
AST SERPL-CCNC: 24 U/L (ref 12–45)
BASOPHILS # BLD AUTO: 0.8 % (ref 0–1.8)
BASOPHILS # BLD: 0.04 K/UL (ref 0–0.12)
CREAT SERPL-MCNC: 0.88 MG/DL (ref 0.5–1.4)
EOSINOPHIL # BLD AUTO: 0.2 K/UL (ref 0–0.51)
EOSINOPHIL NFR BLD: 3.9 % (ref 0–6.9)
ERYTHROCYTE [DISTWIDTH] IN BLOOD BY AUTOMATED COUNT: 45.1 FL (ref 35.9–50)
ERYTHROCYTE [SEDIMENTATION RATE] IN BLOOD BY WESTERGREN METHOD: 9 MM/HOUR (ref 0–25)
GFR SERPLBLD CREATININE-BSD FMLA CKD-EPI: 76 ML/MIN/1.73 M 2
HCT VFR BLD AUTO: 38.4 % (ref 37–47)
HGB BLD-MCNC: 12.5 G/DL (ref 12–16)
IMM GRANULOCYTES # BLD AUTO: 0.02 K/UL (ref 0–0.11)
IMM GRANULOCYTES NFR BLD AUTO: 0.4 % (ref 0–0.9)
LYMPHOCYTES # BLD AUTO: 0.96 K/UL (ref 1–4.8)
LYMPHOCYTES NFR BLD: 18.8 % (ref 22–41)
MCH RBC QN AUTO: 30.9 PG (ref 27–33)
MCHC RBC AUTO-ENTMCNC: 32.6 G/DL (ref 33.6–35)
MCV RBC AUTO: 95 FL (ref 81.4–97.8)
MONOCYTES # BLD AUTO: 0.41 K/UL (ref 0–0.85)
MONOCYTES NFR BLD AUTO: 8 % (ref 0–13.4)
NEUTROPHILS # BLD AUTO: 3.49 K/UL (ref 2–7.15)
NEUTROPHILS NFR BLD: 68.1 % (ref 44–72)
NRBC # BLD AUTO: 0 K/UL
NRBC BLD-RTO: 0 /100 WBC
PLATELET # BLD AUTO: 219 K/UL (ref 164–446)
PMV BLD AUTO: 11.1 FL (ref 9–12.9)
RBC # BLD AUTO: 4.04 M/UL (ref 4.2–5.4)
WBC # BLD AUTO: 5.1 K/UL (ref 4.8–10.8)

## 2022-12-30 PROCEDURE — 84460 ALANINE AMINO (ALT) (SGPT): CPT

## 2022-12-30 PROCEDURE — 36415 COLL VENOUS BLD VENIPUNCTURE: CPT

## 2022-12-30 PROCEDURE — 82565 ASSAY OF CREATININE: CPT

## 2022-12-30 PROCEDURE — 82040 ASSAY OF SERUM ALBUMIN: CPT

## 2022-12-30 PROCEDURE — 84450 TRANSFERASE (AST) (SGOT): CPT

## 2022-12-30 PROCEDURE — 85025 COMPLETE CBC W/AUTO DIFF WBC: CPT

## 2022-12-30 PROCEDURE — 85652 RBC SED RATE AUTOMATED: CPT

## 2023-02-10 ENCOUNTER — TELEPHONE (OUTPATIENT)
Dept: SCHEDULING | Facility: IMAGING CENTER | Age: 58
End: 2023-02-10

## 2023-02-13 ENCOUNTER — TELEPHONE (OUTPATIENT)
Dept: MEDICAL GROUP | Facility: PHYSICIAN GROUP | Age: 58
End: 2023-02-13
Payer: MEDICARE

## 2023-02-14 DIAGNOSIS — Z13.21 ENCOUNTER FOR VITAMIN DEFICIENCY SCREENING: ICD-10-CM

## 2023-02-14 NOTE — TELEPHONE ENCOUNTER
Can you please send a lab order for vitamin D I have my appointment with you 2/21/2023  Thank you

## 2023-02-17 ENCOUNTER — HOSPITAL ENCOUNTER (OUTPATIENT)
Dept: LAB | Facility: MEDICAL CENTER | Age: 58
End: 2023-02-17
Attending: NURSE PRACTITIONER
Payer: MEDICARE

## 2023-02-17 DIAGNOSIS — Z13.21 ENCOUNTER FOR VITAMIN DEFICIENCY SCREENING: ICD-10-CM

## 2023-02-17 LAB — 25(OH)D3 SERPL-MCNC: 32 NG/ML (ref 30–100)

## 2023-02-17 PROCEDURE — 36415 COLL VENOUS BLD VENIPUNCTURE: CPT

## 2023-02-17 PROCEDURE — 82306 VITAMIN D 25 HYDROXY: CPT

## 2023-02-21 ENCOUNTER — OFFICE VISIT (OUTPATIENT)
Dept: MEDICAL GROUP | Facility: PHYSICIAN GROUP | Age: 58
End: 2023-02-21
Payer: MEDICARE

## 2023-02-21 VITALS
SYSTOLIC BLOOD PRESSURE: 108 MMHG | HEART RATE: 70 BPM | TEMPERATURE: 98.2 F | OXYGEN SATURATION: 95 % | HEIGHT: 64 IN | WEIGHT: 186.38 LBS | BODY MASS INDEX: 31.82 KG/M2 | DIASTOLIC BLOOD PRESSURE: 64 MMHG | RESPIRATION RATE: 20 BRPM

## 2023-02-21 DIAGNOSIS — I10 PRIMARY HYPERTENSION: ICD-10-CM

## 2023-02-21 DIAGNOSIS — E55.9 VITAMIN D DEFICIENCY: ICD-10-CM

## 2023-02-21 DIAGNOSIS — Z11.59 NEED FOR HEPATITIS C SCREENING TEST: ICD-10-CM

## 2023-02-21 DIAGNOSIS — M06.9 RHEUMATOID ARTHRITIS, INVOLVING UNSPECIFIED SITE, UNSPECIFIED WHETHER RHEUMATOID FACTOR PRESENT (HCC): ICD-10-CM

## 2023-02-21 DIAGNOSIS — E03.9 ACQUIRED HYPOTHYROIDISM: ICD-10-CM

## 2023-02-21 DIAGNOSIS — E78.00 ELEVATED LDL CHOLESTEROL LEVEL: ICD-10-CM

## 2023-02-21 PROCEDURE — 99214 OFFICE O/P EST MOD 30 MIN: CPT | Performed by: NURSE PRACTITIONER

## 2023-02-21 RX ORDER — ERGOCALCIFEROL 1.25 MG/1
50000 CAPSULE ORAL
Qty: 12 CAPSULE | Refills: 3 | Status: SHIPPED | OUTPATIENT
Start: 2023-02-21 | End: 2023-10-16

## 2023-02-21 ASSESSMENT — FIBROSIS 4 INDEX: FIB4 SCORE: 1.46

## 2023-02-21 ASSESSMENT — PATIENT HEALTH QUESTIONNAIRE - PHQ9: CLINICAL INTERPRETATION OF PHQ2 SCORE: 0

## 2023-02-21 NOTE — ASSESSMENT & PLAN NOTE
"Chronic, ongoing.  Not taking any cholesterol lowering medications.  10-year cardiac risk ASCVD score: 1.6%  Reports diet is \"okay\".   She is following a low-cholesterol diet.  She is exercising regularly.  She is not taking ASA daily.  She denies dizziness, claudication, or chest pain.  Due for updated lab work.     10/02/20 09:35 01/30/21 08:25 10/25/21 09:34   Cholesterol,Tot 223 (H) 214 (H) 222 (H)   Triglycerides 78 63 70   HDL 98 94 94    (H) 107 (H) 114 (H)     "
Chronic and stable. She continues to see Dr. Kan, Rheumatology. She continues to take Sulfasalazine, Xeljanz and Folic Acid.  
Chronic, ongoing.  Denies fatigue.  Denies any muscle weakness.  No history of CKD.  Reports having a good diet, including dairy products.  No history of IBD's, celiac, CF, or surgeries causing malabsorption.  Patient is obese.  Is not taking vitamin d or calcium supplement.      12/02/19 14:09 06/03/20 09:08 01/31/22 10:28 02/17/23 09:07   25-Hydroxy   Vitamin D 25 26 (L) 34 31 32     
present

## 2023-02-21 NOTE — PROGRESS NOTES
"Subjective  Chief Complaint  Requesting Labs    History of Present Illness  Abdias Viera is a 57 y.o. female. This established patient is here today to request lab work and discuss Vitamin D lab result.    Vitamin D deficiency  Chronic, ongoing.  Denies fatigue.  Denies any muscle weakness.  No history of CKD.  Reports having a good diet, including dairy products.  No history of IBD's, celiac, CF, or surgeries causing malabsorption.  Patient is obese.  Is not taking vitamin d or calcium supplement.      12/02/19 14:09 06/03/20 09:08 01/31/22 10:28 02/17/23 09:07   25-Hydroxy   Vitamin D 25 26 (L) 34 31 32       Rheumatoid arthritis (HCC)  Chronic and stable. She continues to see Dr. Kan, Rheumatology. She continues to take Sulfasalazine, Xeljanz and Folic Acid.    Elevated LDL cholesterol level  Chronic, ongoing.  Not taking any cholesterol lowering medications.  10-year cardiac risk ASCVD score: 1.6%  Reports diet is \"okay\".   She is following a low-cholesterol diet.  She is exercising regularly.  She is not taking ASA daily.  She denies dizziness, claudication, or chest pain.  Due for updated lab work.     10/02/20 09:35 01/30/21 08:25 10/25/21 09:34   Cholesterol,Tot 223 (H) 214 (H) 222 (H)   Triglycerides 78 63 70   HDL 98 94 94    (H) 107 (H) 114 (H)     Past Medical History    Allergies: Patient has no known allergies.  Past Medical History:   Diagnosis Date    Anemia     H/O with end of menopause    Anxiety disorder     Arthritis     RA    Asthma     uses inhalers as needed    Heart burn     Heart murmur     High cholesterol     no meds    Hypertension 9/22/2010    Hypothyroid 5/27/2010    MRSA (methicillin resistant Staphylococcus aureus)     Hx of MRSA (when taking Enbrel) 7-8yrs ago    Pain 03/2018    RA pain    Rheumatoid arthritis (HCC)     Scarlet fever when pt was a child     Past Surgical History:   Procedure Laterality Date    [place] TOTAL HIP ARTHROPLASTY Left 6/26/2019    " Procedure: ARTHROPLASTY, HIP, TOTAL;  Surgeon: Janet Davis M.D.;  Location: SURGERY [facility];  Service: Orthopedics    HIP ARTHROPLASTY TOTAL Right 1/30/2019    Procedure: HIP ARTHROPLASTY TOTAL;  Surgeon: Janet Davis M.D.;  Location: SURGERY [facility];  Service: Orthopedics    HYSTEROSCOPY WITH VIDEO DIAGNOSTIC N/A 3/30/2018    Procedure: HYSTEROSCOPY WITH VIDEO DIAGNOSTIC;  Surgeon: Tamera Arias M.D.;  Location: SURGERY SAME DAY [facility];  Service: Obstetrics    PELVIC EXAM UNDER ANESTHESIA N/A 3/30/2018    Procedure: PELVIC EXAM UNDER ANESTHESIA;  Surgeon: Tamera Arias M.D.;  Location: SURGERY SAME DAY [facility];  Service: Obstetrics    DILATION AND CURETTAGE N/A 3/30/2018    Procedure: DILATION AND CURETTAGE;  Surgeon: Tamera Arias M.D.;  Location: SURGERY SAME DAY [facility];  Service: Obstetrics    TUMOR EXCISION WITH BIOPSY  2005    R thigh; benign    OTHER ORTHOPEDIC SURGERY Left 1990    L ankle cyst    OTHER      repairs from going thru sliding glass at age 4    OTHER ORTHOPEDIC SURGERY Left     left hip bone scrape    OTHER ORTHOPEDIC SURGERY Right     Rt leg benign tumor     Current Outpatient Medications Ordered in Epic   Medication Sig Dispense Refill    vitamin D2, Ergocalciferol, (DRISDOL) 1.25 MG (75321 UT) Cap capsule Take 1 Capsule by mouth every 7 days. 12 Capsule 3    lisinopril-hydrochlorothiazide (PRINZIDE) 20-12.5 MG per tablet TAKE ONE TABLET BY MOUTH ONCE DAILY 100 Tablet 1    levothyroxine (SYNTHROID) 112 MCG Tab TAKE 1 TABLET BY MOUTH IN THE MORNING ON AN EMPTY STOMACH. 90 Tablet 1    sulfaSALAzine (AZULFIDINE) 500 MG Tab Take 1,000 mg by mouth 2 times a day.      PARoxetine (PAXIL) 20 MG Tab Take 1 Tablet by mouth every day. 100 Tablet 2    meloxicam (MOBIC) 15 MG tablet       Nutritional Supplements (VITAMIN D BOOSTER PO) Take  by mouth.      XELJANZ 5 MG Tab Take 1 Tab by mouth 2 Times a Day.  1    B  "Complex Vitamins (VITAMIN B COMPLEX) Tab Take  by mouth.      folic acid (FOLVITE) 1 MG TABS Take 1 mg by mouth every day.       No current [facility]-ordered facility-administered medications on file.     Family History:    Family History   Problem Relation Age of Onset    Hypertension Mother     Thyroid Mother     Heart Disease Mother         Heart Attack    Heart Disease Maternal Grandmother     Heart Disease Maternal Grandfather     No Known Problems Brother     Diabetes Neg Hx     Cancer Neg Hx       Personal/Social History:    Social History     Tobacco Use    Smoking status: Never    Smokeless tobacco: Never   Vaping Use    Vaping Use: Never used   Substance Use Topics    Alcohol use: Yes     Comment: Occasionally    Drug use: Yes     Types: Marijuana, Inhaled     Comment: marijuana, CBD PRN (cocaine crank last 1988)     Social History     Social History Narrative    Not on file      Review of Systems:   General: Negative for fever/chills and unexpected weight change.   Respiratory:  Negative for cough and dyspnea.    Cardiovascular:  Negative for chest pain and palpitations.  Musculoskeletal:  Negative for myalgias.   Skin:  Negative for rash.     Objective  Physical Exam:   /64 (BP Location: Left arm, Patient Position: Sitting, BP Cuff Size: Adult)   Pulse 70   Temp 36.8 °C (98.2 °F) (Temporal)   Resp 20   Ht 1.626 m (5' 4\")   Wt 84.5 kg (186 lb 6 oz)   SpO2 95%  Body mass index is 31.99 kg/m².  General:  Alert and oriented.  Well appearing.  NAD  Neck: Supple without JVD. No lymphadenopathy.  Pulmonary:  Normal effort.  Clear to ausculation without rales, ronchi, or wheezing.  Cardiovascular:  Regular rate and rhythm without murmur, rubs or gallop.   Skin:  Warm and dry.  No obvious lesions.  Musculoskeletal:  No extremity cyanosis, clubbing, or edema.      Assessment/Plan  1. Vitamin D deficiency  Chronic and ongoing.  Discussed Vitamin D supplement risks, benefits and side effects, she verbalized " understanding.  - vitamin D2, Ergocalciferol, (DRISDOL) 1.25 MG (66877 UT) Cap capsule; Take 1 Capsule by mouth every 7 days.  Dispense: 12 Capsule; Refill: 3    2. Rheumatoid arthritis, involving unspecified site, unspecified whether rheumatoid factor present (HCC)  Chronic and stable.  Continue to follow up with Rheumatology and take medications as prescribed.    3. Elevated LDL cholesterol level  Chronic and ongoing.  Educated on a healthy diet and exercise.  Due for updated labs.  - Lipid Profile; Future    4. Acquired hypothyroidism  Chronic and ongoing.  Continue to take Levothyroxine 112 mcg every morning on an empty stomach.  Due for updated labs.  - TSH; Future  - FREE THYROXINE; Future    5. Primary hypertension  Chronic and ongoing.  Continue to take Lisinopril-Hydrochlorothiazide 20-12.5 mg daily.  Educated on a healthy diet and exercise.  Due for updated labs.  - Comp Metabolic Panel; Future    6. Need for hepatitis C screening test  Due for updated labs.  - HEP C VIRUS ANTIBODY; Future      Health Maintenance: Discussed with patient.    Return in about 4 weeks (around 3/21/2023) for F/U Labs.    Discussed that the patient carries some responsibility in management of their health care.    Please note that this dictation was created using voice recognition software. I have made every reasonable attempt to correct obvious errors, but I expect that there are errors of grammar and possibly content that I did not discover before finalizing the note.    KATIE Domínguez  [facility]

## 2023-03-07 ENCOUNTER — TELEPHONE (OUTPATIENT)
Dept: HEALTH INFORMATION MANAGEMENT | Facility: OTHER | Age: 58
End: 2023-03-07
Payer: MEDICARE

## 2023-03-20 ENCOUNTER — APPOINTMENT (OUTPATIENT)
Dept: RADIOLOGY | Facility: MEDICAL CENTER | Age: 58
End: 2023-03-20
Attending: NURSE PRACTITIONER
Payer: MEDICARE

## 2023-04-13 ENCOUNTER — OFFICE VISIT (OUTPATIENT)
Dept: MEDICAL GROUP | Facility: PHYSICIAN GROUP | Age: 58
End: 2023-04-13
Payer: MEDICARE

## 2023-04-13 ENCOUNTER — HOSPITAL ENCOUNTER (OUTPATIENT)
Dept: RADIOLOGY | Facility: MEDICAL CENTER | Age: 58
End: 2023-04-13
Attending: NURSE PRACTITIONER
Payer: MEDICARE

## 2023-04-13 VITALS
WEIGHT: 187 LBS | BODY MASS INDEX: 31.92 KG/M2 | DIASTOLIC BLOOD PRESSURE: 78 MMHG | HEIGHT: 64 IN | OXYGEN SATURATION: 96 % | TEMPERATURE: 99 F | HEART RATE: 88 BPM | SYSTOLIC BLOOD PRESSURE: 126 MMHG

## 2023-04-13 DIAGNOSIS — J40 BRONCHITIS: ICD-10-CM

## 2023-04-13 DIAGNOSIS — J06.9 URI, ACUTE: ICD-10-CM

## 2023-04-13 LAB
FLUAV RNA SPEC QL NAA+PROBE: NEGATIVE
FLUBV RNA SPEC QL NAA+PROBE: NEGATIVE
INT CON NEG: NEGATIVE
INT CON POS: POSITIVE
RSV RNA SPEC QL NAA+PROBE: NEGATIVE
S PYO AG THROAT QL: NEGATIVE
SARS-COV-2 RNA RESP QL NAA+PROBE: NEGATIVE

## 2023-04-13 PROCEDURE — 99213 OFFICE O/P EST LOW 20 MIN: CPT | Performed by: NURSE PRACTITIONER

## 2023-04-13 PROCEDURE — 71046 X-RAY EXAM CHEST 2 VIEWS: CPT

## 2023-04-13 PROCEDURE — 0241U POCT CEPHEID COV-2, FLU A/B, RSV - PCR: CPT | Performed by: NURSE PRACTITIONER

## 2023-04-13 PROCEDURE — 87880 STREP A ASSAY W/OPTIC: CPT | Performed by: NURSE PRACTITIONER

## 2023-04-13 RX ORDER — ALBUTEROL SULFATE 90 UG/1
2 AEROSOL, METERED RESPIRATORY (INHALATION) EVERY 4 HOURS PRN
Qty: 1 EACH | Refills: 0 | Status: SHIPPED | OUTPATIENT
Start: 2023-04-13 | End: 2023-10-10

## 2023-04-13 RX ORDER — METHYLPREDNISOLONE 4 MG/1
TABLET ORAL
Qty: 21 TABLET | Refills: 0 | Status: SHIPPED
Start: 2023-04-13 | End: 2023-09-14

## 2023-04-13 ASSESSMENT — FIBROSIS 4 INDEX: FIB4 SCORE: 1.48

## 2023-04-13 NOTE — PROGRESS NOTES
Chief Complaint   Patient presents with    Cough     Cough, wheezing,body aches back hurting from coughing so deep x4days.        HISTORY OF PRESENT ILLNESS: Abdias Viera is a 58 y.o. female established patient  of LORAINE Harrington who presents today to discuss:  - dry cough, wheezing, body aches, chills, nasal congestion since Monday; states her daugther has been sick the past month with viral symptoms (cough).   - hx RA on xeljanz    Current Outpatient Medications on File Prior to Visit   Medication Sig Dispense Refill    vitamin D2, Ergocalciferol, (DRISDOL) 1.25 MG (95873 UT) Cap capsule Take 1 Capsule by mouth every 7 days. 12 Capsule 3    lisinopril-hydrochlorothiazide (PRINZIDE) 20-12.5 MG per tablet TAKE ONE TABLET BY MOUTH ONCE DAILY 100 Tablet 1    levothyroxine (SYNTHROID) 112 MCG Tab TAKE 1 TABLET BY MOUTH IN THE MORNING ON AN EMPTY STOMACH. 90 Tablet 1    sulfaSALAzine (AZULFIDINE) 500 MG Tab Take 1,000 mg by mouth 2 times a day.      PARoxetine (PAXIL) 20 MG Tab Take 1 Tablet by mouth every day. 100 Tablet 2    meloxicam (MOBIC) 15 MG tablet       Nutritional Supplements (VITAMIN D BOOSTER PO) Take  by mouth.      XELJANZ 5 MG Tab Take 1 Tab by mouth 2 Times a Day.  1    B Complex Vitamins (VITAMIN B COMPLEX) Tab Take  by mouth.      folic acid (FOLVITE) 1 MG TABS Take 1 mg by mouth every day.       No current facility-administered medications on file prior to visit.       has a past medical history of Anemia, Anxiety disorder, Arthritis, Asthma, Heart burn, Heart murmur, High cholesterol, Hypertension (9/22/2010), Hypothyroid (5/27/2010), MRSA (methicillin resistant Staphylococcus aureus), Pain (03/2018), Rheumatoid arthritis (HCC), and Scarlet fever (when pt was a child).     Patient Active Problem List   Diagnosis    Rheumatoid arthritis (HCC)    Hypothyroid    Hypertension    Anxiety    Obesity (BMI 30-39.9)    Menopausal disorder    Osteoarthritis resulting from right hip dysplasia    Primary  "osteoarthritis of left hip    Healthcare maintenance    Acute left eye pain    Cough    Vitamin D deficiency    Elevated LDL cholesterol level        Allergies:Patient has no known allergies.    Health Maintenance: deferred  Review of Systems -included above  Exam:   /78   Pulse 88   Temp 37.2 °C (99 °F) (Temporal)   Ht 1.613 m (5' 3.5\")   Wt 84.8 kg (187 lb)   SpO2 96%   Body mass index is 32.61 kg/m².   Physical Exam  Constitutional:       Appearance: Normal appearance.   HENT:      Head: Normocephalic and atraumatic.      Right Ear: Hearing, ear canal and external ear normal. There is impacted cerumen.      Left Ear: Hearing, tympanic membrane, ear canal and external ear normal.      Nose: Congestion and rhinorrhea present.      Right Sinus: Frontal sinus tenderness present.      Left Sinus: Frontal sinus tenderness present.      Mouth/Throat:      Lips: Pink.      Mouth: Mucous membranes are moist.      Pharynx: Uvula midline. Posterior oropharyngeal erythema present.      Tonsils: No tonsillar exudate or tonsillar abscesses.   Cardiovascular:      Rate and Rhythm: Normal rate and regular rhythm.      Pulses: Normal pulses.      Heart sounds: Normal heart sounds.   Pulmonary:      Effort: Pulmonary effort is normal.      Breath sounds: Normal breath sounds.   Musculoskeletal:      Cervical back: Normal range of motion and neck supple.   Skin:     General: Skin is warm and dry.   Neurological:      Mental Status: She is alert.       Assessment/Plan:  1. URI, acute  2. Bronchitis  Symptoms of non productive cough, nasal congestion, low grade fever, malaise x4 days. Daughter at home sick with similar symptoms. BS are clear today. She is audibly congested nasally; unable to visualize right TM due to excessive wax, left TM is slightly bulging. Pharynx is reddened. She smokes marijuana occasionally (no cigarettes). Will get chest xray & check for viral vs bacterial bronchitis/pharyngitis.   Cxray today - " Mild central bronchial wall thickening compatible with viral respiratory infection and/or reactive airways disease commonly.  STREP - NEGATIVE  COVID- NEGATIVE  FLU- NEGATIVE    Symptom management with albuterol; added medrol pack to help with inflammation in lungs; she will let me know if not effective so I can send in cough syrup.     - albuterol 108 (90 Base) MCG/ACT Aero Soln inhalation aerosol; Inhale 2 Puffs every four hours as needed for Shortness of Breath (cough).  Dispense: 1 Each; Refill: 0  - DX-CHEST-2 VIEWS; Future  - POCT Rapid Strep A  - POCT CoV-2, Flu A/B, RSV by PCR  - methylPREDNISolone (MEDROL DOSEPAK) 4 MG Tablet Therapy Pack; As directed on the packaging label.  Dispense: 21 Tablet; Refill: 0     Follow up:  Return if symptoms worsen or fail to improve.    Educated in proper administration of medication(s) ordered today including safety, possible SE, risks, benefits, rationale and alternatives to therapy.       Please note that this dictation was created using voice recognition software. I have made every reasonable attempt to correct obvious errors, but I expect that there are errors of grammar and possibly content that I did not discover before finalizing the note.

## 2023-04-13 NOTE — LETTER
April 13, 2023    To Whom It May Concern:         This is confirmation that Abdias Viera attended her scheduled appointment with Maria A Recinos D.N.P. on 4/13/23. Please excuse her from 4/10/23-4/17/23 due to illness.          If you have any questions please do not hesitate to call me at the phone number listed below.    Sincerely,          Maria A Recinos DNP.   Signed Electronically  [phone]

## 2023-04-13 NOTE — LETTER
April 13, 2023    To Whom It May Concern:         This is confirmation that Abdias Viera attended her scheduled appointment with Maria A Recinos D.N.P. on 4/13/23.          If you have any questions please do not hesitate to call me at the phone number listed below.    Sincerely,          Maribel Reyes-Garcia, Med Ass't  [phone]

## 2023-05-11 DIAGNOSIS — E03.8 OTHER SPECIFIED HYPOTHYROIDISM: ICD-10-CM

## 2023-05-11 PROCEDURE — 1125F AMNT PAIN NOTED PAIN PRSNT: CPT | Performed by: PHYSICIAN ASSISTANT

## 2023-05-11 RX ORDER — LEVOTHYROXINE SODIUM 112 UG/1
TABLET ORAL
Qty: 90 TABLET | Refills: 0 | Status: SHIPPED | OUTPATIENT
Start: 2023-05-11 | End: 2023-08-28

## 2023-06-13 RX ORDER — LISINOPRIL AND HYDROCHLOROTHIAZIDE 20; 12.5 MG/1; MG/1
TABLET ORAL
Qty: 100 TABLET | Refills: 0 | Status: SHIPPED | OUTPATIENT
Start: 2023-06-13 | End: 2023-09-22

## 2023-07-01 ENCOUNTER — HOSPITAL ENCOUNTER (OUTPATIENT)
Dept: LAB | Facility: MEDICAL CENTER | Age: 58
End: 2023-07-01
Attending: NURSE PRACTITIONER
Payer: MEDICARE

## 2023-07-01 DIAGNOSIS — E78.00 ELEVATED LDL CHOLESTEROL LEVEL: ICD-10-CM

## 2023-07-01 DIAGNOSIS — I10 PRIMARY HYPERTENSION: ICD-10-CM

## 2023-07-01 DIAGNOSIS — Z11.59 NEED FOR HEPATITIS C SCREENING TEST: ICD-10-CM

## 2023-07-01 DIAGNOSIS — E03.9 ACQUIRED HYPOTHYROIDISM: ICD-10-CM

## 2023-07-01 LAB
ALBUMIN SERPL BCP-MCNC: 4.7 G/DL (ref 3.2–4.9)
ALBUMIN/GLOB SERPL: 2 G/DL
ALP SERPL-CCNC: 90 U/L (ref 30–99)
ALT SERPL-CCNC: 26 U/L (ref 2–50)
ANION GAP SERPL CALC-SCNC: 14 MMOL/L (ref 7–16)
AST SERPL-CCNC: 25 U/L (ref 12–45)
BILIRUB SERPL-MCNC: 0.3 MG/DL (ref 0.1–1.5)
BUN SERPL-MCNC: 19 MG/DL (ref 8–22)
CALCIUM ALBUM COR SERPL-MCNC: 8.7 MG/DL (ref 8.5–10.5)
CALCIUM SERPL-MCNC: 9.3 MG/DL (ref 8.5–10.5)
CHLORIDE SERPL-SCNC: 103 MMOL/L (ref 96–112)
CHOLEST SERPL-MCNC: 236 MG/DL (ref 100–199)
CO2 SERPL-SCNC: 26 MMOL/L (ref 20–33)
CREAT SERPL-MCNC: 0.87 MG/DL (ref 0.5–1.4)
FASTING STATUS PATIENT QL REPORTED: NORMAL
GFR SERPLBLD CREATININE-BSD FMLA CKD-EPI: 77 ML/MIN/1.73 M 2
GLOBULIN SER CALC-MCNC: 2.4 G/DL (ref 1.9–3.5)
GLUCOSE SERPL-MCNC: 108 MG/DL (ref 65–99)
HCV AB SER QL: NORMAL
HDLC SERPL-MCNC: 99 MG/DL
LDLC SERPL CALC-MCNC: 118 MG/DL
POTASSIUM SERPL-SCNC: 3.6 MMOL/L (ref 3.6–5.5)
PROT SERPL-MCNC: 7.1 G/DL (ref 6–8.2)
SODIUM SERPL-SCNC: 143 MMOL/L (ref 135–145)
T4 FREE SERPL-MCNC: 1.13 NG/DL (ref 0.93–1.7)
TRIGL SERPL-MCNC: 93 MG/DL (ref 0–149)
TSH SERPL DL<=0.005 MIU/L-ACNC: 1.46 UIU/ML (ref 0.38–5.33)

## 2023-07-01 PROCEDURE — 84439 ASSAY OF FREE THYROXINE: CPT

## 2023-07-01 PROCEDURE — 80053 COMPREHEN METABOLIC PANEL: CPT

## 2023-07-01 PROCEDURE — 86803 HEPATITIS C AB TEST: CPT

## 2023-07-01 PROCEDURE — 80061 LIPID PANEL: CPT

## 2023-07-01 PROCEDURE — 84443 ASSAY THYROID STIM HORMONE: CPT

## 2023-07-01 PROCEDURE — 36415 COLL VENOUS BLD VENIPUNCTURE: CPT

## 2023-08-15 DIAGNOSIS — E03.8 OTHER SPECIFIED HYPOTHYROIDISM: ICD-10-CM

## 2023-08-16 RX ORDER — LEVOTHYROXINE SODIUM 112 UG/1
112 TABLET ORAL
Qty: 90 TABLET | Refills: 0 | OUTPATIENT
Start: 2023-08-16

## 2023-08-26 DIAGNOSIS — E03.8 OTHER SPECIFIED HYPOTHYROIDISM: ICD-10-CM

## 2023-08-28 RX ORDER — LEVOTHYROXINE SODIUM 112 UG/1
TABLET ORAL
Qty: 90 TABLET | Refills: 0 | Status: SHIPPED | OUTPATIENT
Start: 2023-08-28 | End: 2023-11-27

## 2023-08-28 NOTE — TELEPHONE ENCOUNTER
Received request via: Pharmacy    Was the patient seen in the last year in this department? Yes    Does the patient have an active prescription (recently filled or refills available) for medication(s) requested? No    Does the patient have group home Plus and need 100 day supply (blood pressure, diabetes and cholesterol meds only)? Medication is not for cholesterol, blood pressure or diabetes    *patient has an appt to establish with new pcp: Lillian Borges, on 10/16/2023.

## 2023-09-12 RX ORDER — TOFACITINIB 5 MG/1
TABLET, FILM COATED ORAL
COMMUNITY

## 2023-09-13 RX ORDER — LISINOPRIL AND HYDROCHLOROTHIAZIDE 20; 12.5 MG/1; MG/1
1 TABLET ORAL
COMMUNITY
End: 2023-09-13

## 2023-09-13 RX ORDER — PAROXETINE 10 MG/1
1 TABLET, FILM COATED ORAL EVERY MORNING
COMMUNITY
End: 2023-09-14

## 2023-09-13 RX ORDER — ERGOCALCIFEROL 1.25 MG/1
1 CAPSULE ORAL
COMMUNITY
End: 2023-09-13

## 2023-09-13 RX ORDER — LEVOTHYROXINE SODIUM 0.12 MG/1
TABLET ORAL
COMMUNITY
End: 2023-09-14

## 2023-09-14 ENCOUNTER — OFFICE VISIT (OUTPATIENT)
Dept: MEDICAL GROUP | Facility: PHYSICIAN GROUP | Age: 58
End: 2023-09-14
Payer: MEDICARE

## 2023-09-14 VITALS
RESPIRATION RATE: 16 BRPM | BODY MASS INDEX: 31.09 KG/M2 | HEART RATE: 70 BPM | OXYGEN SATURATION: 94 % | HEIGHT: 64 IN | WEIGHT: 182.13 LBS | DIASTOLIC BLOOD PRESSURE: 74 MMHG | SYSTOLIC BLOOD PRESSURE: 132 MMHG | TEMPERATURE: 98 F

## 2023-09-14 DIAGNOSIS — F41.9 ANXIETY: ICD-10-CM

## 2023-09-14 DIAGNOSIS — E78.5 DYSLIPIDEMIA: ICD-10-CM

## 2023-09-14 DIAGNOSIS — E66.3 OVERWEIGHT: ICD-10-CM

## 2023-09-14 DIAGNOSIS — I10 PRIMARY HYPERTENSION: ICD-10-CM

## 2023-09-14 DIAGNOSIS — R41.840 ATTENTION AND CONCENTRATION DEFICIT: ICD-10-CM

## 2023-09-14 DIAGNOSIS — R46.89 COMPULSIVE BEHAVIOR: ICD-10-CM

## 2023-09-14 DIAGNOSIS — Z12.31 ENCOUNTER FOR SCREENING MAMMOGRAM FOR MALIGNANT NEOPLASM OF BREAST: ICD-10-CM

## 2023-09-14 DIAGNOSIS — R73.01 IFG (IMPAIRED FASTING GLUCOSE): ICD-10-CM

## 2023-09-14 DIAGNOSIS — N95.9 MENOPAUSAL DISORDER: ICD-10-CM

## 2023-09-14 DIAGNOSIS — E55.9 VITAMIN D DEFICIENCY: ICD-10-CM

## 2023-09-14 DIAGNOSIS — E03.9 HYPOTHYROIDISM, UNSPECIFIED TYPE: ICD-10-CM

## 2023-09-14 DIAGNOSIS — M06.9 RHEUMATOID ARTHRITIS, INVOLVING UNSPECIFIED SITE, UNSPECIFIED WHETHER RHEUMATOID FACTOR PRESENT (HCC): ICD-10-CM

## 2023-09-14 PROCEDURE — 99214 OFFICE O/P EST MOD 30 MIN: CPT | Performed by: PHYSICIAN ASSISTANT

## 2023-09-14 PROCEDURE — 3075F SYST BP GE 130 - 139MM HG: CPT | Performed by: PHYSICIAN ASSISTANT

## 2023-09-14 PROCEDURE — 3078F DIAST BP <80 MM HG: CPT | Performed by: PHYSICIAN ASSISTANT

## 2023-09-14 ASSESSMENT — ENCOUNTER SYMPTOMS
CHILLS: 0
SHORTNESS OF BREATH: 0
FEVER: 0

## 2023-09-14 ASSESSMENT — FIBROSIS 4 INDEX: FIB4 SCORE: 1.32

## 2023-09-14 NOTE — PATIENT INSTRUCTIONS
9/14/2023:    Cut paroxetine in half. Take 10mg daily until I see you in a month-ze.  Do your blood work before I see you.

## 2023-09-14 NOTE — PROGRESS NOTES
Annual Health Assessment Questions:    1.  Are you currently engaging in any exercise or physical activity? Yes    2.  How would you describe your mood or emotional well-being today? anxious    3.  Have you had any falls in the last year? Yes    4.  Have you noticed any problems with your balance or had difficulty walking? No    5.  In the last six months have you experienced any leakage of urine? Yes    6. DPA/Advanced Directive: Patient has Advance Directive, but it is not on file. Instructed to bring in a copy to scan into their chart.

## 2023-09-14 NOTE — PROGRESS NOTES
Subjective:     CC: establish care; prior KATIE Domínguez         HPI:   Abdias presents today with    Problem   Compulsive Behavior    Chronic, intermittent.  Patient was clean for about a year and 3 months until she recently fell off the wagon.  She has been in [place], [facility], for the past 3 years.  She has a sponsor and is going to start a therapist through a local nonprofit.  Incidentally patient had a positive screen for ADHD.  Wondering if Wellbutrin would have a positive impact on some of the symptoms.  Holding off on adding medication for now.  Patient is going to try decreasing her paroxetine to 10 mg daily and follow-up in a month.     Attention and Concentration Deficit    Chronic, uncontrolled.  Patient is positive on ADHD screening questions.  Does not carry an official diagnosis of ADHD.  Patient is going to try decreasing her paroxetine to 10 mg daily from a mood standpoint but may consider trying Wellbutrin to see if this helps alleviate some of the attention and concentration issues.     Vitamin D Deficiency    Chronic, controlled.  Tolerating 50K IU weekly.  Due to repeat labs.     Dyslipidemia    Chronic, uncontrolled.   Latest Labs:   Lab Results   Component Value Date/Time    CHOLSTRLTOT 236 (H) 07/01/2023 08:15 AM     (H) 07/01/2023 08:15 AM    HDL 99 07/01/2023 08:15 AM    TRIGLYCERIDE 93 07/01/2023 08:15 AM      Medications: none  Medication side effects:   Diet/Exercise:   Family History of high cholesterol or heart disease?   Risk calculator: The 10-year ASCVD risk score (Patt DK, et al., 2019) is: 3.2%        Menopausal Disorder    Chronic, stable.  Patient was having issues with mood changes and was started on paroxetine around 2018.  She feels that these have somewhat stabilized and is thinking about coming off of the medication.  She will decrease paroxetine to 10 mg daily.     Anxiety    Chronic, controlled.  Tolerating paroxetine 20mg daily.  States that she started  "paroxetine for menopausal symptoms.  She states she is very emotional, crying all the time.  Chart review shows she started this about 2018.  She is wondering if she can try coming off of it.  We will decrease to 10 mg daily and follow-up in 1 month.     Hypertension    Chronic, controlled.  Tolerating lisinopril/HCTZ 20/12.5mg daily.     Rheumatoid Arthritis (Hcc)    Chronic, controlled.  Managed by rheumatology.   Diagnosed around 2000.  Dr Kelly in [place].  Tolerating sulfasalazine and xeljanz (well controlled; notices when a dose is missed).  Previously on methotrexate.       Hypothyroid    Chronic, controlled.  Tolerating levothyroxine 112mcg daily.  She states that she was on 125 mcg daily and is not sure why she was switched.  Repeating TSH and checking TPO/antithyroglobulin ab.     Obesity (Bmi 30-39.9) (Resolved)       Adult ADHD screening tool:  How often do you have trouble wrapping up the final details of a project once the challenging parts have been done? often   How often do you have difficulty getting things in order when you have to do a task that requires organization?   Very often  How often you have problems remembering appointments or obligations? Rarely     When you have a task that requires a lot of thought, how often to avoid or delay getting started? often    How often do you fidget or squirm with your hands or feet when you have to sit down for a long time? Very often   How often do you feel overly active and compelled to do things, like you were driven by a motor? often       ROS:  Review of Systems   Constitutional:  Negative for chills and fever.   Respiratory:  Negative for shortness of breath.    Cardiovascular:  Negative for chest pain.       Objective:     Exam:  /74 (BP Location: Right arm, Patient Position: Sitting, BP Cuff Size: Large adult)   Pulse 70   Temp 36.7 °C (98 °F) (Temporal)   Resp 16   Ht 1.613 m (5' 3.5\")   Wt 82.6 kg (182 lb 2 oz)   LMP 03/01/2018   SpO2 " 94%   Breastfeeding No   BMI 31.76 kg/m²  Body mass index is 31.76 kg/m².    Physical Exam  Vitals reviewed.   Constitutional:       General: She is not in acute distress.     Appearance: Normal appearance.   Cardiovascular:      Rate and Rhythm: Normal rate and regular rhythm.      Heart sounds: Normal heart sounds.   Pulmonary:      Effort: Pulmonary effort is normal.      Breath sounds: Normal breath sounds.   Skin:     General: Skin is warm.   Neurological:      General: No focal deficit present.      Mental Status: She is alert.   Psychiatric:         Mood and Affect: Mood normal.         Behavior: Behavior normal.         Judgment: Judgment normal.                 Assessment & Plan:     58 y.o. female with the following -     1. Primary hypertension  Chronic, stable.  Continue lisinopril/HCTZ 20/12.5 mg daily.    2. Hypothyroidism, unspecified type  Chronic, control unknown.  Patient states she was changed from 125 mcg daily to 112 mcg daily without a change in labs.  Continue 112 mcg daily.  Due to repeat labs.    - TSH; Future  - THYROID PEROXIDASE  (TPO) AB; Future  - ANTITHYROGLOBULIN AB; Future    3. Dyslipidemia  Chronic, uncontrolled.  Patient is not currently medicated.  Due to repeat labs.    4. Compulsive behavior  Chronic, uncontrolled.  Patient was clean for one year and 3 months.  She has been with [facility] for 3 years.  She has a sponsor and recently started seeing a therapist through a local nonprofit.  Patient is in the process of trialing a decrease dose of paroxetine, 10 mg for the next month or so.  May consider adding Wellbutrin at a later date.    5. Menopausal disorder  Chronic, stable.  Patient is in the process of trialing a decrease dose of paroxetine, 10 mg for the next month or so.    6. Attention and concentration deficit  Chronic, intermittent.  Positive on screening questions.  May consider adding Wellbutrin at a later date.    7. Anxiety  Chronic, stable.  Patient is  in the process of trialing a decrease dose of paroxetine, 10 mg for the next month or so.  May consider adding Wellbutrin at a later date.    8. Vitamin D deficiency  Chronic, controlled.  Continue 50K IU weekly.    - VITAMIN D,25 HYDROXY (DEFICIENCY); Future    9. Overweight  Chronic, uncontrolled.  Discussed increasing plant-based foods, decreasing animal-based foods.  Increase daily activity, aiming for 30-45 minutes brisk exercise daily.    - CBC WITH DIFFERENTIAL; Future  - Lipid Profile; Future    10. IFG (impaired fasting glucose)  Chronic, control unknown.  Checking A1c.    - HEMOGLOBIN A1C; Future    11. Rheumatoid arthritis, involving unspecified site, unspecified whether rheumatoid factor present (HCC)  Chronic, controlled.  Managed by rheumatology.    12. Encounter for screening mammogram for malignant neoplasm of breast    - MA-SCREENING MAMMO BILAT W/TOMOSYNTHESIS W/CAD; Future      Due to repeat labs.  Follow up in 4-6 weeks to discuss results and trial decrease dose of paroxetine.    Healthcare Maintenance:    Completed:  Pap: doesn't see GYN; due for Pap        Return in about 1 month (around 10/14/2023).    Please note that this dictation was created using voice recognition software. I have made every reasonable attempt to correct obvious errors, but I expect that there are errors of grammar and possibly content that I did not discover before finalizing the note.

## 2023-09-22 RX ORDER — LISINOPRIL AND HYDROCHLOROTHIAZIDE 20; 12.5 MG/1; MG/1
TABLET ORAL
Qty: 100 TABLET | Refills: 0 | Status: SHIPPED | OUTPATIENT
Start: 2023-09-22

## 2023-09-22 RX ORDER — LISINOPRIL AND HYDROCHLOROTHIAZIDE 20; 12.5 MG/1; MG/1
TABLET ORAL
Qty: 100 TABLET | Refills: 0 | Status: SHIPPED | OUTPATIENT
Start: 2023-09-22 | End: 2023-09-22 | Stop reason: SDUPTHER

## 2023-09-22 RX ORDER — BUPROPION HYDROCHLORIDE 75 MG/1
75 TABLET ORAL 2 TIMES DAILY
Qty: 60 TABLET | Refills: 2 | Status: SHIPPED | OUTPATIENT
Start: 2023-09-22 | End: 2023-12-27

## 2023-09-28 ENCOUNTER — HOSPITAL ENCOUNTER (OUTPATIENT)
Dept: RADIOLOGY | Facility: MEDICAL CENTER | Age: 58
End: 2023-09-28
Attending: PHYSICIAN ASSISTANT
Payer: MEDICARE

## 2023-09-28 DIAGNOSIS — Z12.31 ENCOUNTER FOR SCREENING MAMMOGRAM FOR MALIGNANT NEOPLASM OF BREAST: ICD-10-CM

## 2023-09-28 PROCEDURE — 77063 BREAST TOMOSYNTHESIS BI: CPT

## 2023-10-01 DIAGNOSIS — F41.9 ANXIETY: ICD-10-CM

## 2023-10-02 RX ORDER — PAROXETINE HYDROCHLORIDE 20 MG/1
20 TABLET, FILM COATED ORAL DAILY
Qty: 100 TABLET | Refills: 0 | Status: SHIPPED | OUTPATIENT
Start: 2023-10-02 | End: 2024-01-30

## 2023-10-13 ENCOUNTER — HOSPITAL ENCOUNTER (OUTPATIENT)
Dept: LAB | Facility: MEDICAL CENTER | Age: 58
End: 2023-10-13
Attending: PHYSICIAN ASSISTANT
Payer: MEDICARE

## 2023-10-13 DIAGNOSIS — E03.9 HYPOTHYROIDISM, UNSPECIFIED TYPE: ICD-10-CM

## 2023-10-13 DIAGNOSIS — R73.01 IFG (IMPAIRED FASTING GLUCOSE): ICD-10-CM

## 2023-10-13 DIAGNOSIS — E55.9 VITAMIN D DEFICIENCY: ICD-10-CM

## 2023-10-13 DIAGNOSIS — E66.3 OVERWEIGHT: ICD-10-CM

## 2023-10-13 LAB
25(OH)D3 SERPL-MCNC: 33 NG/ML (ref 30–100)
BASOPHILS # BLD AUTO: 0.5 % (ref 0–1.8)
BASOPHILS # BLD: 0.02 K/UL (ref 0–0.12)
CHOLEST SERPL-MCNC: 230 MG/DL (ref 100–199)
EOSINOPHIL # BLD AUTO: 0.16 K/UL (ref 0–0.51)
EOSINOPHIL NFR BLD: 4.1 % (ref 0–6.9)
ERYTHROCYTE [DISTWIDTH] IN BLOOD BY AUTOMATED COUNT: 43.2 FL (ref 35.9–50)
EST. AVERAGE GLUCOSE BLD GHB EST-MCNC: 100 MG/DL
FASTING STATUS PATIENT QL REPORTED: NORMAL
HBA1C MFR BLD: 5.1 % (ref 4–5.6)
HCT VFR BLD AUTO: 39.6 % (ref 37–47)
HDLC SERPL-MCNC: 90 MG/DL
HGB BLD-MCNC: 13.2 G/DL (ref 12–16)
IMM GRANULOCYTES # BLD AUTO: 0.01 K/UL (ref 0–0.11)
IMM GRANULOCYTES NFR BLD AUTO: 0.3 % (ref 0–0.9)
LDLC SERPL CALC-MCNC: 126 MG/DL
LYMPHOCYTES # BLD AUTO: 0.61 K/UL (ref 1–4.8)
LYMPHOCYTES NFR BLD: 15.5 % (ref 22–41)
MCH RBC QN AUTO: 31.3 PG (ref 27–33)
MCHC RBC AUTO-ENTMCNC: 33.3 G/DL (ref 32.2–35.5)
MCV RBC AUTO: 93.8 FL (ref 81.4–97.8)
MONOCYTES # BLD AUTO: 0.28 K/UL (ref 0–0.85)
MONOCYTES NFR BLD AUTO: 7.1 % (ref 0–13.4)
NEUTROPHILS # BLD AUTO: 2.85 K/UL (ref 1.82–7.42)
NEUTROPHILS NFR BLD: 72.5 % (ref 44–72)
NRBC # BLD AUTO: 0 K/UL
NRBC BLD-RTO: 0 /100 WBC (ref 0–0.2)
PLATELET # BLD AUTO: 228 K/UL (ref 164–446)
PMV BLD AUTO: 10.9 FL (ref 9–12.9)
RBC # BLD AUTO: 4.22 M/UL (ref 4.2–5.4)
THYROPEROXIDASE AB SERPL-ACNC: 21 IU/ML (ref 0–9)
TRIGL SERPL-MCNC: 68 MG/DL (ref 0–149)
TSH SERPL DL<=0.005 MIU/L-ACNC: 1.36 UIU/ML (ref 0.38–5.33)
WBC # BLD AUTO: 3.9 K/UL (ref 4.8–10.8)

## 2023-10-13 PROCEDURE — 85025 COMPLETE CBC W/AUTO DIFF WBC: CPT

## 2023-10-13 PROCEDURE — 80061 LIPID PANEL: CPT

## 2023-10-13 PROCEDURE — 84443 ASSAY THYROID STIM HORMONE: CPT

## 2023-10-13 PROCEDURE — 36415 COLL VENOUS BLD VENIPUNCTURE: CPT

## 2023-10-13 PROCEDURE — 82306 VITAMIN D 25 HYDROXY: CPT

## 2023-10-13 PROCEDURE — 83036 HEMOGLOBIN GLYCOSYLATED A1C: CPT

## 2023-10-13 PROCEDURE — 86800 THYROGLOBULIN ANTIBODY: CPT

## 2023-10-13 PROCEDURE — 86376 MICROSOMAL ANTIBODY EACH: CPT

## 2023-10-16 ENCOUNTER — TELEMEDICINE (OUTPATIENT)
Dept: MEDICAL GROUP | Facility: PHYSICIAN GROUP | Age: 58
End: 2023-10-16
Payer: MEDICARE

## 2023-10-16 VITALS — HEIGHT: 64 IN | RESPIRATION RATE: 16 BRPM | WEIGHT: 178 LBS | BODY MASS INDEX: 30.39 KG/M2

## 2023-10-16 DIAGNOSIS — F41.9 ANXIETY: ICD-10-CM

## 2023-10-16 DIAGNOSIS — I10 PRIMARY HYPERTENSION: ICD-10-CM

## 2023-10-16 DIAGNOSIS — R73.01 IFG (IMPAIRED FASTING GLUCOSE): ICD-10-CM

## 2023-10-16 DIAGNOSIS — E78.5 DYSLIPIDEMIA: ICD-10-CM

## 2023-10-16 DIAGNOSIS — R46.89 COMPULSIVE BEHAVIOR: ICD-10-CM

## 2023-10-16 DIAGNOSIS — R41.840 ATTENTION AND CONCENTRATION DEFICIT: ICD-10-CM

## 2023-10-16 LAB — THYROGLOB AB SERPL-ACNC: 136.2 IU/ML (ref 0–4)

## 2023-10-16 PROCEDURE — 99214 OFFICE O/P EST MOD 30 MIN: CPT | Mod: 95 | Performed by: PHYSICIAN ASSISTANT

## 2023-10-16 ASSESSMENT — FIBROSIS 4 INDEX: FIB4 SCORE: 1.27

## 2023-10-16 NOTE — ASSESSMENT & PLAN NOTE
Chronic, intermittent.  Patient was clean for about a year and 3 months until she recently fell off the wagon.  She has been in [place] [facility], for the past 3 years.  She has a sponsor and is going to start a therapist through a local nonprofit.  Incidentally patient had a positive screen for ADHD.  Wondering if Wellbutrin would have a positive impact on some of the symptoms.  Holding off on adding medication for now.  Patient is going to try decreasing her paroxetine to 10 mg daily and follow-up in a month.    9/22/2023:  [employer] message says that she broke down, went gambling, and had a panic attack after decreasing her paroxetine to 10 mg.  Recommend staying on 20 mg.  Starting Wellbutrin 75 mg twice daily.      10/16/2023:   Chronic, controlled.   Feeling MUCH  Better.  Felt awful off paroxetine. Back on 20mg.  Feels a huge difference on wellbutrin 75mg BID.  Does feel like her feelings are a little dulled but she's ok with that.

## 2023-10-16 NOTE — ASSESSMENT & PLAN NOTE
Chronic, controlled.  Component      Latest Ref Rng 10/13/2023   Glycohemoglobin      4.0 - 5.6 % 5.1

## 2023-10-16 NOTE — ASSESSMENT & PLAN NOTE
Chronic, uncontrolled.   Latest Labs:   Lab Results   Component Value Date/Time    CHOLSTRLTOT 230 (H) 10/13/2023 10:44 AM     (H) 10/13/2023 10:44 AM    HDL 90 10/13/2023 10:44 AM    TRIGLYCERIDE 68 10/13/2023 10:44 AM      Medications: none  Medication side effects:   Diet/Exercise:   Family History of high cholesterol or heart disease?   Risk calculator: The 10-year ASCVD risk score (Patt KEN, et al., 2019) is: 2.3%

## 2023-10-16 NOTE — ASSESSMENT & PLAN NOTE
Chronic, uncontrolled.  Patient is positive on ADHD screening questions.  Does not carry an official diagnosis of ADHD.  Patient is going to try decreasing her paroxetine to 10 mg daily from a mood standpoint but may consider trying Wellbutrin to see if this helps alleviate some of the attention and concentration issues.    10/16/2023:   Chronic, controlled.   Feeling MUCH  Better.  Felt awful off paroxetine. Back on 20mg.  Feels a huge difference on wellbutrin 75mg BID.  Does feel like her feelings are a little dulled but she's ok with that.

## 2023-10-16 NOTE — ASSESSMENT & PLAN NOTE
Chronic, controlled.  Tolerating paroxetine 20mg daily.  States that she started paroxetine for menopausal symptoms.  She states she is very emotional, crying all the time.  Chart review shows she started this about 2018.  She is wondering if she can try coming off of it.  We will decrease to 10 mg daily and follow-up in 1 month.    10/16/2023:   Chronic, controlled.   Feeling MUCH  Better.  Felt awful off paroxetine. Back on 20mg.  Feels a huge difference on wellbutrin 75mg BID.  Does feel like her feelings are a little dulled but she's ok with that.

## 2023-10-16 NOTE — ASSESSMENT & PLAN NOTE
Chronic, controlled.  Tolerating 50K IU weekly.    Component      Latest Ref Rng 10/13/2023   25-Hydroxy Vitamin D 25      30 - 100 ng/mL 33

## 2023-10-16 NOTE — ASSESSMENT & PLAN NOTE
Chronic, controlled.  Tolerating levothyroxine 112mcg daily.    Antithyroglobulin ab is still processing.    Component      Latest Ref Rng 10/13/2023   Microsomal -Tpo- Abs      0.0 - 9.0 IU/mL 21.0 (H)

## 2023-10-16 NOTE — PROGRESS NOTES
Virtual Visit: Established Patient   This visit was conducted via Zoom using secure and encrypted videoconferencing technology.   The [facility].    The patient's identity was confirmed and verbal consent was obtained for this virtual visit.     Subjective:   CC:   Chief Complaint   Patient presents with    Lab Results    Medication Follow-up     bupropion    Other     Patient wants to know if she can have a small drink every once in a while       Abdias Viera is a 58 y.o. female presenting for evaluation and management of:    Attention and concentration deficit  Chronic, uncontrolled.  Patient is positive on ADHD screening questions.  Does not carry an official diagnosis of ADHD.  Patient is going to try decreasing her paroxetine to 10 mg daily from a mood standpoint but may consider trying Wellbutrin to see if this helps alleviate some of the attention and concentration issues.      10/16/2023:   Chronic, controlled.   Feeling MUCH  Better.  Felt awful off paroxetine. Back on 20mg.  Feels a huge difference on wellbutrin 75mg BID.  Does feel like her feelings are a little dulled but she's ok with that.    Anxiety  Chronic, controlled.  Tolerating paroxetine 20mg daily.  States that she started paroxetine for menopausal symptoms.  She states she is very emotional, crying all the time.  Chart review shows she started this about 2018.  She is wondering if she can try coming off of it.  We will decrease to 10 mg daily and follow-up in 1 month.    10/16/2023:   Chronic, controlled.   Feeling MUCH  Better.  Felt awful off paroxetine. Back on 20mg.  Feels a huge difference on wellbutrin 75mg BID.  Does feel like her feelings are a little dulled but she's ok with that.        Hypertension  Chronic, controlled.  Tolerating lisinopril/HCTZ 20/12.5mg daily.    Dyslipidemia  Chronic, uncontrolled.   Latest Labs:   Lab Results   Component Value Date/Time    CHOLSTRLTOT 230 (H)  10/13/2023 10:44 AM     (H) 10/13/2023 10:44 AM    HDL 90 10/13/2023 10:44 AM    TRIGLYCERIDE 68 10/13/2023 10:44 AM      Medications: none  Medication side effects:   Diet/Exercise:   Family History of high cholesterol or heart disease?   Risk calculator: The 10-year ASCVD risk score (Patt KEN, et al., 2019) is: 2.3%       Hypothyroid  Chronic, controlled.  Tolerating levothyroxine 112mcg daily.    Antithyroglobulin ab is still processing.    Component      Latest Ref Rng 10/13/2023   Microsomal -Tpo- Abs      0.0 - 9.0 IU/mL 21.0 (H)           IFG (impaired fasting glucose)  Chronic, controlled.  Component      Latest Ref Rng 10/13/2023   Glycohemoglobin      4.0 - 5.6 % 5.1            Vitamin D deficiency  Chronic, controlled.  Tolerating 50K IU weekly.    Component      Latest Ref Rng 10/13/2023   25-Hydroxy Vitamin D 25      30 - 100 ng/mL 33            Compulsive behavior  Chronic, intermittent.  Patient was clean for about a year and 3 months until she recently fell off the [employer].  She has been in [place], [employer], for the past 3 years.  She has a sponsor and is going to start a therapist through a local nonprofit.  Incidentally patient had a positive screen for ADHD.  Wondering if Wellbutrin would have a positive impact on some of the symptoms.  Holding off on adding medication for now.  Patient is going to try decreasing her paroxetine to 10 mg daily and follow-up in a month.    9/22/2023:  [employer] message says that she broke down, went gambling, and had a panic attack after decreasing her paroxetine to 10 mg.  Recommend staying on 20 mg.  Starting Wellbutrin 75 mg twice daily.      10/16/2023:   Chronic, controlled.   Feeling MUCH  Better.  Felt awful off paroxetine. Back on 20mg.  Feels a huge difference on wellbutrin 75mg BID.  Does feel like her feelings are a little dulled but she's ok with that.        ROS   Denies CP, SOB, N/V/D    Current medicines (including changes today)  Current  "Outpatient Medications   Medication Sig Dispense Refill    albuterol 108 (90 Base) MCG/ACT Aero Soln inhalation aerosol INHALE TWO PUFFS BY MOUTH EVERY 4 HOURS AS NEEDED FOR SHORTNESS OF BREATH/cough 8.5 g 5    PARoxetine (PAXIL) 20 MG Tab TAKE ONE TABLET BY MOUTH ONCE DAILY 100 Tablet 0    lisinopril-hydrochlorothiazide (PRINZIDE) 20-12.5 MG per tablet TAKE ONE TABLET BY MOUTH ONCE DAILY. 100 Tablet 0    buPROPion (WELLBUTRIN) 75 MG Tab Take 1 Tablet by mouth 2 times a day. 60 Tablet 2    Tofacitinib Citrate (XELJANZ) 5 MG Tab 1 tablet Orally Twice a day for 90 days      levothyroxine (SYNTHROID) 112 MCG Tab TAKE ONE TABLET BY MOUTH EVERY MORNING ON AN EMPTY STOMACH 90 Tablet 0    sulfaSALAzine (AZULFIDINE) 500 MG Tab Take 1,000 mg by mouth 2 times a day.      meloxicam (MOBIC) 15 MG tablet        No current facility-administered medications for this visit.       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  Skin: No rashes in visible areas.  Eye: Round. Conjunctiva clear, lids normal. No icterus.   ENMT: Lips pink without lesions, good dentition, moist mucous membranes. Phonation normal.  Neck: No masses, no thyromegaly. Moves freely without pain.  Respiratory: Unlabored respiratory effort, no cough or audible wheeze  Psych: " Alert and oriented x3, normal affect and mood.             Assessment and Plan:   The following treatment plan was discussed:     1. Compulsive behavior  2. Anxiety  3. Attention and concentration deficit  Chronic, stable per  Patient is feeling much better on Wellbutrin 75 mg twice daily.    4. Primary hypertension  Chronic, controlled.  Continue lisinopril/HCTZ 20/12.5 mg daily.    5. Dyslipidemia  Chronic, uncontrolled.  Patient declines medication at this time but is trying to make some lifestyle changes.    6. IFG (impaired fasting glucose)  Chronic, stable.  A1c 5.1% 10/13/2023.    Follow-up: Return if symptoms worsen or fail to improve.

## 2023-11-25 DIAGNOSIS — E03.8 OTHER SPECIFIED HYPOTHYROIDISM: ICD-10-CM

## 2023-11-27 RX ORDER — LEVOTHYROXINE SODIUM 112 UG/1
112 TABLET ORAL
Qty: 90 TABLET | Refills: 2 | Status: SHIPPED | OUTPATIENT
Start: 2023-11-27

## 2023-12-27 RX ORDER — BUPROPION HYDROCHLORIDE 75 MG/1
75 TABLET ORAL 2 TIMES DAILY
Qty: 180 TABLET | Refills: 0 | Status: SHIPPED | OUTPATIENT
Start: 2023-12-27 | End: 2024-03-21

## 2024-01-27 DIAGNOSIS — F41.9 ANXIETY: ICD-10-CM

## 2024-01-30 RX ORDER — PAROXETINE HYDROCHLORIDE 20 MG/1
20 TABLET, FILM COATED ORAL DAILY
Qty: 100 TABLET | Refills: 2 | Status: SHIPPED | OUTPATIENT
Start: 2024-01-30

## 2024-01-30 NOTE — TELEPHONE ENCOUNTER
Received request via: Pharmacy    Was the patient seen in the last year in this department? Yes    Does the patient have an active prescription (recently filled or refills available) for medication(s) requested? No    [facility] Name: [facility]    Does the patient have [facility] Plus and need 100 day supply (blood pressure, diabetes and cholesterol meds only)? Yes, quantity updated to 100 days

## 2024-03-15 ENCOUNTER — HOSPITAL ENCOUNTER (OUTPATIENT)
Dept: LAB | Facility: MEDICAL CENTER | Age: 59
End: 2024-03-15
Attending: INTERNAL MEDICINE
Payer: MEDICARE

## 2024-03-15 LAB
ALBUMIN SERPL BCP-MCNC: 4.3 G/DL (ref 3.2–4.9)
ALT SERPL-CCNC: 24 U/L (ref 2–50)
AST SERPL-CCNC: 21 U/L (ref 12–45)
BASOPHILS # BLD AUTO: 0.8 % (ref 0–1.8)
BASOPHILS # BLD: 0.04 K/UL (ref 0–0.12)
CREAT SERPL-MCNC: 0.86 MG/DL (ref 0.5–1.4)
EOSINOPHIL # BLD AUTO: 0.27 K/UL (ref 0–0.51)
EOSINOPHIL NFR BLD: 5.5 % (ref 0–6.9)
ERYTHROCYTE [DISTWIDTH] IN BLOOD BY AUTOMATED COUNT: 48.3 FL (ref 35.9–50)
ERYTHROCYTE [SEDIMENTATION RATE] IN BLOOD BY WESTERGREN METHOD: 7 MM/HOUR (ref 0–25)
GFR SERPLBLD CREATININE-BSD FMLA CKD-EPI: 77 ML/MIN/1.73 M 2
HCT VFR BLD AUTO: 41 % (ref 37–47)
HGB BLD-MCNC: 13 G/DL (ref 12–16)
IMM GRANULOCYTES # BLD AUTO: 0.01 K/UL (ref 0–0.11)
IMM GRANULOCYTES NFR BLD AUTO: 0.2 % (ref 0–0.9)
LYMPHOCYTES # BLD AUTO: 0.67 K/UL (ref 1–4.8)
LYMPHOCYTES NFR BLD: 13.5 % (ref 22–41)
MCH RBC QN AUTO: 31 PG (ref 27–33)
MCHC RBC AUTO-ENTMCNC: 31.7 G/DL (ref 32.2–35.5)
MCV RBC AUTO: 97.9 FL (ref 81.4–97.8)
MONOCYTES # BLD AUTO: 0.37 K/UL (ref 0–0.85)
MONOCYTES NFR BLD AUTO: 7.5 % (ref 0–13.4)
NEUTROPHILS # BLD AUTO: 3.59 K/UL (ref 1.82–7.42)
NEUTROPHILS NFR BLD: 72.5 % (ref 44–72)
NRBC # BLD AUTO: 0 K/UL
NRBC BLD-RTO: 0 /100 WBC (ref 0–0.2)
PLATELET # BLD AUTO: 225 K/UL (ref 164–446)
PMV BLD AUTO: 10.7 FL (ref 9–12.9)
RBC # BLD AUTO: 4.19 M/UL (ref 4.2–5.4)
WBC # BLD AUTO: 5 K/UL (ref 4.8–10.8)

## 2024-03-15 PROCEDURE — 82565 ASSAY OF CREATININE: CPT

## 2024-03-15 PROCEDURE — 85025 COMPLETE CBC W/AUTO DIFF WBC: CPT

## 2024-03-15 PROCEDURE — 84460 ALANINE AMINO (ALT) (SGPT): CPT

## 2024-03-15 PROCEDURE — 85652 RBC SED RATE AUTOMATED: CPT

## 2024-03-15 PROCEDURE — 82040 ASSAY OF SERUM ALBUMIN: CPT

## 2024-03-15 PROCEDURE — 36415 COLL VENOUS BLD VENIPUNCTURE: CPT

## 2024-03-15 PROCEDURE — 84450 TRANSFERASE (AST) (SGOT): CPT

## 2024-03-20 ENCOUNTER — HOSPITAL ENCOUNTER (OUTPATIENT)
Dept: RADIOLOGY | Facility: MEDICAL CENTER | Age: 59
End: 2024-03-20
Attending: PHYSICIAN ASSISTANT
Payer: MEDICARE

## 2024-03-20 DIAGNOSIS — M25.561 ACUTE PAIN OF RIGHT KNEE: ICD-10-CM

## 2024-03-20 PROCEDURE — 93971 EXTREMITY STUDY: CPT

## 2024-03-21 DIAGNOSIS — M25.562 ACUTE PAIN OF LEFT KNEE: ICD-10-CM

## 2024-03-21 RX ORDER — BUPROPION HYDROCHLORIDE 75 MG/1
75 TABLET ORAL 2 TIMES DAILY
Qty: 180 TABLET | Refills: 2 | Status: SHIPPED | OUTPATIENT
Start: 2024-03-21

## 2024-03-21 NOTE — TELEPHONE ENCOUNTER
Received request via: Pharmacy    Was the patient seen in the last year in this department? Yes    Does the patient have an active prescription (recently filled or refills available) for medication(s) requested? No    [facility]    Does the patient have snf Plus and need 100 day supply (blood pressure, diabetes and cholesterol meds only)? Medication is not for cholesterol, blood pressure or diabetes

## 2024-03-22 ENCOUNTER — APPOINTMENT (OUTPATIENT)
Dept: RADIOLOGY | Facility: MEDICAL CENTER | Age: 59
End: 2024-03-22
Attending: PHYSICIAN ASSISTANT
Payer: MEDICARE

## 2024-04-15 ENCOUNTER — HOSPITAL ENCOUNTER (OUTPATIENT)
Dept: RADIOLOGY | Facility: MEDICAL CENTER | Age: 59
End: 2024-04-15
Attending: PHYSICIAN ASSISTANT
Payer: MEDICARE

## 2024-04-15 DIAGNOSIS — M25.561 ACUTE PAIN OF RIGHT KNEE: ICD-10-CM

## 2024-04-15 PROCEDURE — 73721 MRI JNT OF LWR EXTRE W/O DYE: CPT

## 2024-04-30 PROBLEM — M17.12 DEGENERATIVE ARTHRITIS OF LEFT KNEE: Status: ACTIVE | Noted: 2024-04-30

## 2024-05-02 ENCOUNTER — APPOINTMENT (OUTPATIENT)
Dept: RADIOLOGY | Facility: MEDICAL CENTER | Age: 59
End: 2024-05-02
Attending: ORTHOPAEDIC SURGERY
Payer: MEDICARE

## 2024-05-07 ENCOUNTER — TELEPHONE (OUTPATIENT)
Dept: HEALTH INFORMATION MANAGEMENT | Facility: OTHER | Age: 59
End: 2024-05-07
Payer: MEDICARE

## 2024-05-21 RX ORDER — LISINOPRIL AND HYDROCHLOROTHIAZIDE 20; 12.5 MG/1; MG/1
TABLET ORAL
Qty: 100 TABLET | Refills: 0 | Status: SHIPPED | OUTPATIENT
Start: 2024-05-21

## 2024-07-02 RX ORDER — ERGOCALCIFEROL 1.25 MG/1
50000 CAPSULE ORAL
Qty: 12 CAPSULE | Refills: 0 | Status: SHIPPED | OUTPATIENT
Start: 2024-07-02

## 2024-08-07 ENCOUNTER — HOSPITAL ENCOUNTER (OUTPATIENT)
Dept: RADIOLOGY | Facility: MEDICAL CENTER | Age: 59
End: 2024-08-07
Attending: ORTHOPAEDIC SURGERY
Payer: MEDICARE

## 2024-08-07 PROCEDURE — 93971 EXTREMITY STUDY: CPT | Mod: LT

## 2024-08-14 DIAGNOSIS — E03.8 OTHER SPECIFIED HYPOTHYROIDISM: ICD-10-CM

## 2024-08-15 RX ORDER — LEVOTHYROXINE SODIUM 112 UG/1
112 TABLET ORAL
Qty: 90 TABLET | Refills: 0 | Status: SHIPPED | OUTPATIENT
Start: 2024-08-15

## 2024-08-15 NOTE — TELEPHONE ENCOUNTER
Received request via: Pharmacy    Was the patient seen in the last year in this department? Yes    Does the patient have an active prescription (recently filled or refills available) for medication(s) requested? No    Does the patient have halfway Plus and need 100-day supply? (This applies to ALL medications) Yes, quantity updated to 100 days

## 2024-08-19 ENCOUNTER — HOSPITAL ENCOUNTER (OUTPATIENT)
Dept: RADIOLOGY | Facility: MEDICAL CENTER | Age: 59
End: 2024-08-19
Attending: ORTHOPAEDIC SURGERY
Payer: MEDICARE

## 2024-08-19 DIAGNOSIS — Z01.818 PRE-OP EXAM: ICD-10-CM

## 2024-08-19 PROCEDURE — 73700 CT LOWER EXTREMITY W/O DYE: CPT | Mod: LT

## 2024-08-27 PROBLEM — J45.20 MILD INTERMITTENT ASTHMA: Status: ACTIVE | Noted: 2024-08-27

## 2024-08-27 NOTE — TELEPHONE ENCOUNTER
Received request via: Pharmacy    Was the patient seen in the last year in this department? Yes    Does the patient have an active prescription (recently filled or refills available) for medication(s) requested? No    Does the patient have detention Plus and need 100-day supply? (This applies to ALL medications) Yes, quantity updated to 100 days

## 2024-08-29 RX ORDER — LISINOPRIL AND HYDROCHLOROTHIAZIDE 12.5; 2 MG/1; MG/1
TABLET ORAL
Qty: 100 TABLET | Refills: 0 | Status: SHIPPED | OUTPATIENT
Start: 2024-08-29

## 2024-09-18 DIAGNOSIS — R73.01 IFG (IMPAIRED FASTING GLUCOSE): ICD-10-CM

## 2024-09-18 DIAGNOSIS — E55.9 VITAMIN D DEFICIENCY: ICD-10-CM

## 2024-09-18 DIAGNOSIS — E66.3 OVERWEIGHT: ICD-10-CM

## 2024-09-24 ENCOUNTER — HOSPITAL ENCOUNTER (OUTPATIENT)
Dept: LAB | Facility: MEDICAL CENTER | Age: 59
End: 2024-09-24
Attending: INTERNAL MEDICINE
Payer: MEDICARE

## 2024-09-24 ENCOUNTER — HOSPITAL ENCOUNTER (OUTPATIENT)
Dept: LAB | Facility: MEDICAL CENTER | Age: 59
End: 2024-09-24
Attending: PHYSICIAN ASSISTANT
Payer: MEDICARE

## 2024-09-24 DIAGNOSIS — E55.9 VITAMIN D DEFICIENCY: ICD-10-CM

## 2024-09-24 DIAGNOSIS — R73.01 IFG (IMPAIRED FASTING GLUCOSE): ICD-10-CM

## 2024-09-24 DIAGNOSIS — E66.3 OVERWEIGHT: ICD-10-CM

## 2024-09-24 LAB
ALT SERPL-CCNC: 19 U/L (ref 2–50)
AST SERPL-CCNC: 19 U/L (ref 12–45)
BASOPHILS # BLD AUTO: 0.6 % (ref 0–1.8)
BASOPHILS # BLD AUTO: 0.7 % (ref 0–1.8)
BASOPHILS # BLD: 0.03 K/UL (ref 0–0.12)
BASOPHILS # BLD: 0.03 K/UL (ref 0–0.12)
CREAT SERPL-MCNC: 0.78 MG/DL (ref 0.5–1.4)
EOSINOPHIL # BLD AUTO: 0.24 K/UL (ref 0–0.51)
EOSINOPHIL # BLD AUTO: 0.25 K/UL (ref 0–0.51)
EOSINOPHIL NFR BLD: 5.1 % (ref 0–6.9)
EOSINOPHIL NFR BLD: 5.5 % (ref 0–6.9)
ERYTHROCYTE [DISTWIDTH] IN BLOOD BY AUTOMATED COUNT: 46.3 FL (ref 35.9–50)
ERYTHROCYTE [DISTWIDTH] IN BLOOD BY AUTOMATED COUNT: 47.8 FL (ref 35.9–50)
ERYTHROCYTE [SEDIMENTATION RATE] IN BLOOD BY WESTERGREN METHOD: 16 MM/HOUR (ref 0–25)
EST. AVERAGE GLUCOSE BLD GHB EST-MCNC: 103 MG/DL
GFR SERPLBLD CREATININE-BSD FMLA CKD-EPI: 87 ML/MIN/1.73 M 2
HBA1C MFR BLD: 5.2 % (ref 4–5.6)
HCT VFR BLD AUTO: 37.6 % (ref 37–47)
HCT VFR BLD AUTO: 38.1 % (ref 37–47)
HGB BLD-MCNC: 11.8 G/DL (ref 12–16)
HGB BLD-MCNC: 12.2 G/DL (ref 12–16)
IMM GRANULOCYTES # BLD AUTO: 0.02 K/UL (ref 0–0.11)
IMM GRANULOCYTES # BLD AUTO: 0.02 K/UL (ref 0–0.11)
IMM GRANULOCYTES NFR BLD AUTO: 0.4 % (ref 0–0.9)
IMM GRANULOCYTES NFR BLD AUTO: 0.4 % (ref 0–0.9)
LYMPHOCYTES # BLD AUTO: 0.82 K/UL (ref 1–4.8)
LYMPHOCYTES # BLD AUTO: 0.84 K/UL (ref 1–4.8)
LYMPHOCYTES NFR BLD: 17.9 % (ref 22–41)
LYMPHOCYTES NFR BLD: 18.1 % (ref 22–41)
MCH RBC QN AUTO: 29.6 PG (ref 27–33)
MCH RBC QN AUTO: 30.6 PG (ref 27–33)
MCHC RBC AUTO-ENTMCNC: 31.4 G/DL (ref 32.2–35.5)
MCHC RBC AUTO-ENTMCNC: 32 G/DL (ref 32.2–35.5)
MCV RBC AUTO: 94.5 FL (ref 81.4–97.8)
MCV RBC AUTO: 95.5 FL (ref 81.4–97.8)
MONOCYTES # BLD AUTO: 0.38 K/UL (ref 0–0.85)
MONOCYTES # BLD AUTO: 0.39 K/UL (ref 0–0.85)
MONOCYTES NFR BLD AUTO: 8.3 % (ref 0–13.4)
MONOCYTES NFR BLD AUTO: 8.4 % (ref 0–13.4)
NEUTROPHILS # BLD AUTO: 3.04 K/UL (ref 1.82–7.42)
NEUTROPHILS # BLD AUTO: 3.17 K/UL (ref 1.82–7.42)
NEUTROPHILS NFR BLD: 66.9 % (ref 44–72)
NEUTROPHILS NFR BLD: 67.7 % (ref 44–72)
NRBC # BLD AUTO: 0 K/UL
NRBC # BLD AUTO: 0 K/UL
NRBC BLD-RTO: 0 /100 WBC (ref 0–0.2)
NRBC BLD-RTO: 0 /100 WBC (ref 0–0.2)
PLATELET # BLD AUTO: 241 K/UL (ref 164–446)
PLATELET # BLD AUTO: 249 K/UL (ref 164–446)
PMV BLD AUTO: 10.8 FL (ref 9–12.9)
PMV BLD AUTO: 10.8 FL (ref 9–12.9)
RBC # BLD AUTO: 3.98 M/UL (ref 4.2–5.4)
RBC # BLD AUTO: 3.99 M/UL (ref 4.2–5.4)
WBC # BLD AUTO: 4.5 K/UL (ref 4.8–10.8)
WBC # BLD AUTO: 4.7 K/UL (ref 4.8–10.8)

## 2024-09-24 PROCEDURE — 83036 HEMOGLOBIN GLYCOSYLATED A1C: CPT

## 2024-09-24 PROCEDURE — 84439 ASSAY OF FREE THYROXINE: CPT

## 2024-09-24 PROCEDURE — 85025 COMPLETE CBC W/AUTO DIFF WBC: CPT | Mod: 91

## 2024-09-24 PROCEDURE — 80053 COMPREHEN METABOLIC PANEL: CPT

## 2024-09-24 PROCEDURE — 85652 RBC SED RATE AUTOMATED: CPT

## 2024-09-24 PROCEDURE — 84450 TRANSFERASE (AST) (SGOT): CPT

## 2024-09-24 PROCEDURE — 84460 ALANINE AMINO (ALT) (SGPT): CPT

## 2024-09-24 PROCEDURE — 36415 COLL VENOUS BLD VENIPUNCTURE: CPT

## 2024-09-24 PROCEDURE — 80061 LIPID PANEL: CPT

## 2024-09-24 PROCEDURE — 82306 VITAMIN D 25 HYDROXY: CPT

## 2024-09-24 PROCEDURE — 84443 ASSAY THYROID STIM HORMONE: CPT

## 2024-09-24 PROCEDURE — 82565 ASSAY OF CREATININE: CPT

## 2024-09-24 PROCEDURE — 85025 COMPLETE CBC W/AUTO DIFF WBC: CPT

## 2024-09-24 RX ORDER — ERGOCALCIFEROL 1.25 MG/1
CAPSULE, LIQUID FILLED ORAL
Qty: 12 CAPSULE | Refills: 0 | Status: SHIPPED | OUTPATIENT
Start: 2024-09-24

## 2024-09-24 NOTE — TELEPHONE ENCOUNTER
Received request via: Pharmacy    Was the patient seen in the last year in this department? Yes    Does the patient have an active prescription (recently filled or refills available) for medication(s) requested? No    Pharmacy Name: [id number] - [place], [place] - [address]     Does the patient have retirement Plus and need 100-day supply? (This applies to ALL medications) Yes, quantity updated to 100 days

## 2024-09-25 LAB
25(OH)D3 SERPL-MCNC: 27 NG/ML (ref 30–100)
ALBUMIN SERPL BCP-MCNC: 4.2 G/DL (ref 3.2–4.9)
ALBUMIN/GLOB SERPL: 1.6 G/DL
ALP SERPL-CCNC: 90 U/L (ref 30–99)
ALT SERPL-CCNC: 17 U/L (ref 2–50)
ANION GAP SERPL CALC-SCNC: 12 MMOL/L (ref 7–16)
AST SERPL-CCNC: 18 U/L (ref 12–45)
BILIRUB SERPL-MCNC: 0.5 MG/DL (ref 0.1–1.5)
BUN SERPL-MCNC: 22 MG/DL (ref 8–22)
CALCIUM ALBUM COR SERPL-MCNC: 9.2 MG/DL (ref 8.5–10.5)
CALCIUM SERPL-MCNC: 9.4 MG/DL (ref 8.5–10.5)
CHLORIDE SERPL-SCNC: 102 MMOL/L (ref 96–112)
CHOLEST SERPL-MCNC: 231 MG/DL (ref 100–199)
CO2 SERPL-SCNC: 26 MMOL/L (ref 20–33)
CREAT SERPL-MCNC: 0.84 MG/DL (ref 0.5–1.4)
GFR SERPLBLD CREATININE-BSD FMLA CKD-EPI: 79 ML/MIN/1.73 M 2
GLOBULIN SER CALC-MCNC: 2.6 G/DL (ref 1.9–3.5)
GLUCOSE SERPL-MCNC: 94 MG/DL (ref 65–99)
HDLC SERPL-MCNC: 91 MG/DL
LDLC SERPL CALC-MCNC: 126 MG/DL
POTASSIUM SERPL-SCNC: 4.3 MMOL/L (ref 3.6–5.5)
PROT SERPL-MCNC: 6.8 G/DL (ref 6–8.2)
SODIUM SERPL-SCNC: 140 MMOL/L (ref 135–145)
T4 FREE SERPL-MCNC: 1.02 NG/DL (ref 0.93–1.7)
TRIGL SERPL-MCNC: 71 MG/DL (ref 0–149)
TSH SERPL DL<=0.005 MIU/L-ACNC: 9.58 UIU/ML (ref 0.38–5.33)

## 2024-12-01 DIAGNOSIS — F41.9 ANXIETY: ICD-10-CM

## 2024-12-03 NOTE — TELEPHONE ENCOUNTER
Received request via: Pharmacy    Was the patient seen in the last year in this department? Yes    Does the patient have an active prescription (recently filled or refills available) for medication(s) requested? No    [facility]    Does the patient have MCFP Plus and need 100-day supply? (This applies to ALL medications) Patient does not have SCP

## 2024-12-04 RX ORDER — PAROXETINE 20 MG/1
20 TABLET, FILM COATED ORAL DAILY
Qty: 100 TABLET | Refills: 3 | Status: SHIPPED | OUTPATIENT
Start: 2024-12-04

## 2024-12-05 ENCOUNTER — APPOINTMENT (OUTPATIENT)
Dept: MEDICAL GROUP | Facility: PHYSICIAN GROUP | Age: 59
End: 2024-12-05
Payer: MEDICARE

## 2024-12-05 VITALS
TEMPERATURE: 98.2 F | DIASTOLIC BLOOD PRESSURE: 70 MMHG | RESPIRATION RATE: 16 BRPM | HEIGHT: 63 IN | OXYGEN SATURATION: 95 % | SYSTOLIC BLOOD PRESSURE: 136 MMHG | WEIGHT: 175.6 LBS | HEART RATE: 67 BPM | BODY MASS INDEX: 31.11 KG/M2

## 2024-12-05 DIAGNOSIS — Z12.31 ENCOUNTER FOR SCREENING MAMMOGRAM FOR MALIGNANT NEOPLASM OF BREAST: ICD-10-CM

## 2024-12-05 DIAGNOSIS — N95.9 MENOPAUSAL DISORDER: ICD-10-CM

## 2024-12-05 DIAGNOSIS — Z96.652 STATUS POST TOTAL LEFT KNEE REPLACEMENT: ICD-10-CM

## 2024-12-05 DIAGNOSIS — D64.9 ANEMIA, UNSPECIFIED TYPE: ICD-10-CM

## 2024-12-05 DIAGNOSIS — E78.5 DYSLIPIDEMIA: ICD-10-CM

## 2024-12-05 DIAGNOSIS — E03.9 HYPOTHYROIDISM, UNSPECIFIED TYPE: ICD-10-CM

## 2024-12-05 DIAGNOSIS — I10 PRIMARY HYPERTENSION: ICD-10-CM

## 2024-12-05 DIAGNOSIS — J45.20 MILD INTERMITTENT ASTHMA WITHOUT COMPLICATION: ICD-10-CM

## 2024-12-05 DIAGNOSIS — R46.89 COMPULSIVE BEHAVIOR: ICD-10-CM

## 2024-12-05 DIAGNOSIS — E55.9 VITAMIN D DEFICIENCY: ICD-10-CM

## 2024-12-05 DIAGNOSIS — R73.01 IFG (IMPAIRED FASTING GLUCOSE): ICD-10-CM

## 2024-12-05 DIAGNOSIS — F43.9 STRESS: ICD-10-CM

## 2024-12-05 DIAGNOSIS — R41.840 ATTENTION AND CONCENTRATION DEFICIT: ICD-10-CM

## 2024-12-05 DIAGNOSIS — F41.9 ANXIETY: ICD-10-CM

## 2024-12-05 DIAGNOSIS — E66.3 OVERWEIGHT: ICD-10-CM

## 2024-12-05 DIAGNOSIS — M06.9 RHEUMATOID ARTHRITIS, INVOLVING UNSPECIFIED SITE, UNSPECIFIED WHETHER RHEUMATOID FACTOR PRESENT (HCC): ICD-10-CM

## 2024-12-05 PROCEDURE — 3075F SYST BP GE 130 - 139MM HG: CPT | Performed by: PHYSICIAN ASSISTANT

## 2024-12-05 PROCEDURE — 99214 OFFICE O/P EST MOD 30 MIN: CPT | Performed by: PHYSICIAN ASSISTANT

## 2024-12-05 PROCEDURE — 3078F DIAST BP <80 MM HG: CPT | Performed by: PHYSICIAN ASSISTANT

## 2024-12-05 RX ORDER — LEVOTHYROXINE SODIUM 125 UG/1
125 TABLET ORAL
Qty: 90 TABLET | Refills: 1 | Status: SHIPPED | OUTPATIENT
Start: 2024-12-05

## 2024-12-05 RX ORDER — BUPROPION HYDROCHLORIDE 75 MG/1
75 TABLET ORAL 2 TIMES DAILY
Qty: 180 TABLET | Refills: 1 | Status: SHIPPED | OUTPATIENT
Start: 2024-12-05

## 2024-12-05 RX ORDER — LISINOPRIL AND HYDROCHLOROTHIAZIDE 12.5; 2 MG/1; MG/1
TABLET ORAL
Qty: 100 TABLET | Refills: 0 | Status: SHIPPED | OUTPATIENT
Start: 2024-12-05

## 2024-12-05 RX ORDER — ERGOCALCIFEROL 1.25 MG/1
50000 CAPSULE, LIQUID FILLED ORAL
Qty: 12 CAPSULE | Refills: 1 | Status: SHIPPED | OUTPATIENT
Start: 2024-12-05

## 2024-12-05 ASSESSMENT — FIBROSIS 4 INDEX: FIB4 SCORE: 1.05

## 2024-12-05 ASSESSMENT — ENCOUNTER SYMPTOMS
FEVER: 0
CHILLS: 0
SHORTNESS OF BREATH: 0

## 2024-12-05 ASSESSMENT — PATIENT HEALTH QUESTIONNAIRE - PHQ9: CLINICAL INTERPRETATION OF PHQ2 SCORE: 0

## 2024-12-05 NOTE — ASSESSMENT & PLAN NOTE
Chronic, uncontrolled.     Latest Labs:   Lab Results   Component Value Date/Time    CHOLSTRLTOT 231 (H) 09/24/2024 09:26 AM     (H) 09/24/2024 09:26 AM    HDL 91 09/24/2024 09:26 AM    TRIGLYCERIDE 71 09/24/2024 09:26 AM        Medications: none    Risk calculator: The 10-year ASCVD risk score (Patt KEN, et al., 2019) is: 5.5%

## 2024-12-05 NOTE — PROGRESS NOTES
SUBJECTIVE:     CC: lab follow up     HPI:   Abdias presents today with the following:    ASSESSMENT & PLAN by Problem:     Problem   Overweight    Chronic, uncontrolled.  Patient has been working on it and has lost a few pounds.  Has been working on regular basis.     Stress    Chronic, stable.  Patient's mother has been in the hospital in [place].     Mild Intermittent Asthma    Chronic, stable.   Not needing albuterol often.     Status Post Total Left Knee Replacement    Left TKA 8/2024, Dr. Davis     Ifg (Impaired Fasting Glucose)    Chronic, stable.  A1c 5.2 (9/2024).     Compulsive Behavior    Chronic, stable.  Tolerating/continue bupropion 75mg BID.  May try to wean bupropion. She'll let me know.  Smoking marijuana helps with anxiety/stress as well.  Gambling has been under control.  Continues going to meetings weekly.     Attention and Concentration Deficit    Chronic, stable.  Tolerating/continue bupropion 75mg BID.  She is not sure it is doing anything/that she needs it.  May try to wean bupropion. She'll let me know.     Vitamin D Deficiency    Chronic, uncontrolled.   Was tolerating 50,000 IU weekly.  Hasn't been taking them.  Will restart.     Dyslipidemia    Chronic, uncontrolled.  Not currently medicated.  Increase plant-based foods, decrease animal-based foods.  Increase daily activity, aiming for 30-45 minutes brisk exercise daily.  Says she'll start red yeast rice.     Menopausal Disorder    Chronic, stable.  Tolerating/continue paroxetine to 20 mg daily.     Anxiety    Chronic, stable.  Tolerating/continue paroxetine 20mg daily and bupropion 75mg BID.  May try to wean bupropion. She'll let me know.  Smoking marijuana helps with anxiety/stress as well.     Hypertension    Chronic, controlled.  Tolerating lisinopril/HCTZ 20/12.5mg daily.     Rheumatoid Arthritis (Hcc)    Chronic, controlled.  Managed by rheumatology, Dr Vang in [place]..   Diagnosed around 2000.    Tolerating/continue sulfasalazine  and xelaubriez.     Hypothyroid    Chronic, uncontrolled.  Currently tolerating levothyroxine 112 mcg daily.  TSH 9.5 (9/2024).  Increasing levothyroxine dosing to 125 mcg daily.  Repeat TSH in 8 weeks, around early February 2025.       Influenza: declines   Pneumococcal vaccine: declines     Pap: discussed recommendation.  Mammogram: Normal, 9/28/2023.  Colon cancer screening: has the Cologuard at home    Repeat labs early February 2025/with Dr. Vang's labs.  Will contact patient with labs and any med changes.    Return in about 1 year (around 12/5/2025), or if symptoms worsen or fail to improve, for lab discussion.        Healthcare Maintenance:      HPI:     Problem List Items Addressed This Visit       Anxiety     Chronic, controlled.  Tolerating paroxetine 20mg daily.  States that she started paroxetine for menopausal symptoms.  She states she is very emotional, crying all the time.  Chart review shows she started this about 2018.  She is wondering if she can try coming off of it.  We will decrease to 10 mg daily and follow-up in 1 month.    10/16/2023:   Chronic, controlled.   Feeling MUCH  Better.  Felt awful off paroxetine. Back on 20mg.  Feels a huge difference on wellbutrin 75mg BID.  Does feel like her feelings are a little dulled but she's ok with that.             Relevant Medications    buPROPion (WELLBUTRIN) 75 MG Tab    Attention and concentration deficit     Chronic, uncontrolled.  Patient is positive on ADHD screening questions.  Does not carry an official diagnosis of ADHD.  Patient is going to try decreasing her paroxetine to 10 mg daily from a mood standpoint but may consider trying Wellbutrin to see if this helps alleviate some of the attention and concentration issues.    10/16/2023:   Chronic, controlled.   Feeling MUCH  Better.  Felt awful off paroxetine. Back on 20mg.  Feels a huge difference on wellbutrin 75mg BID.  Does feel like her feelings are a little dulled but she's ok with that.          Compulsive behavior       Interval history 9/2023  Chronic, intermittent.  Patient was clean for about a year and 3 months until she recently fell off the wagon.  [place] has been in [place] [facility] [place], for the past 3 years.  She has a sponsor and is going to start a therapist through a local nonprofit.  Incidentally patient had a positive screen for ADHD.  Wondering if Wellbutrin would have a positive impact on some of the symptoms.  Holding off on adding medication for now.  Patient is going to try decreasing her paroxetine to 10 mg daily and follow-up in a month.    9/22/2023:  [employer] message says that she broke down, went gambling, and had a panic attack after decreasing her paroxetine to 10 mg.  Recommend staying on 20 mg.  Starting Wellbutrin 75 mg twice daily.      10/16/2023:   Chronic, controlled.   Feeling MUCH  Better.  Felt awful off paroxetine. Back on 20mg.  Feels a huge difference on wellbutrin 75mg BID.  Does feel like her feelings are a little dulled but she's ok with that.           Relevant Medications    buPROPion (WELLBUTRIN) 75 MG Tab    Dyslipidemia     Chronic, uncontrolled.     Latest Labs:   Lab Results   Component Value Date/Time    CHOLSTRLTOT 231 (H) 09/24/2024 09:26 AM     (H) 09/24/2024 09:26 AM    HDL 91 09/24/2024 09:26 AM    TRIGLYCERIDE 71 09/24/2024 09:26 AM        Medications: none    Risk calculator: The 10-year ASCVD risk score (Patt KEN, et al., 2019) is: 5.5%            Hypertension     Chronic, controlled.  Tolerating lisinopril/HCTZ 20/12.5mg daily.         Relevant Medications    lisinopril-hydrochlorothiazide (PRINZIDE) 20-12.5 MG per tablet    Hypothyroid     Chronic, uncontrolled.   Tolerating levothyroxine 112mcg daily.  TSH 9.5 (9/2024).  Increasing to 125 mcg daily.    Positive TPO antibody and antithyroglobulin antibodies.         Relevant Medications    levothyroxine (SYNTHROID) 125 MCG Tab    Other Relevant Orders    TSH    IFG (impaired fasting  "glucose)     Chronic, stable.  A1c: 5.2 (9/2024); 5.1 (10/2023).         Menopausal disorder       Interval history 9/2023:  Chronic, stable.  Patient was having issues with mood changes and was started on paroxetine around 2018.  She feels that these have somewhat stabilized and is thinking about coming off of the medication.  She will decrease paroxetine to 10 mg daily.         Mild intermittent asthma    Overweight     Chronic, uncontrolled.  Working on it.  Walking twice/day.           Rheumatoid arthritis (HCC)     Chronic, controlled.  Managed by rheumatology.   Diagnosed around 2000.  Dr Vang in [place].  Tolerating sulfasalazine and xeljanz (well controlled; notices when a dose is missed).  Previously on methotrexate.         Status post total left knee replacement    Stress    Vitamin D deficiency    Relevant Medications    vitamin D2, Ergocalciferol, (DRISDOL) 1.25 MG (48836 UT) Cap capsule    Other Relevant Orders    VITAMIN D,25 HYDROXY (DEFICIENCY)     Other Visit Diagnoses       Anemia, unspecified type        Relevant Orders    CBC WITH DIFFERENTIAL    IRON/TOTAL IRON BIND    FERRITIN    TRANSFERRIN SATURATION    Encounter for screening mammogram for malignant neoplasm of breast        Relevant Orders    MA-SCREENING MAMMO BILAT W/TOMOSYNTHESIS W/CAD                   ROS:  Review of Systems   Constitutional:  Negative for chills and fever.   Respiratory:  Negative for shortness of breath.    Cardiovascular:  Negative for chest pain.       OBJECTIVE:     Exam:  /70 (BP Location: Right arm, Patient Position: Sitting, BP Cuff Size: Adult)   Pulse 67   Temp 36.8 °C (98.2 °F) (Temporal)   Resp 16   Ht 1.6 m (5' 3\")   Wt 79.7 kg (175 lb 9.6 oz)   LMP 03/01/2018   SpO2 95%   BMI 31.11 kg/m²  Body mass index is 31.11 kg/m².    Physical Exam  Vitals reviewed.   Constitutional:       General: She is not in acute distress.     Appearance: Normal appearance.   Cardiovascular:      Rate and Rhythm: " Normal rate and regular rhythm.      Heart sounds: Normal heart sounds.   Pulmonary:      Effort: Pulmonary effort is normal.      Breath sounds: Normal breath sounds.   Musculoskeletal:      Cervical back: Normal range of motion and neck supple.   Skin:     General: Skin is warm.   Neurological:      General: No focal deficit present.      Mental Status: She is alert.   Psychiatric:         Mood and Affect: Mood normal.         Behavior: Behavior normal.         Judgment: Judgment normal.           CHART REVIEW:     Labs:                  Please note that this dictation was created using voice recognition software. I have made every reasonable attempt to correct obvious errors, but I expect that there are errors of grammar and possibly content that I did not discover before finalizing the note.

## 2024-12-05 NOTE — ASSESSMENT & PLAN NOTE
Interval history 9/2023  Chronic, intermittent.  Patient was clean for about a year and 3 months until she recently fell off the wagon.  [place] has been in [place], for the past 3 years.  She has a sponsor and is going to start a therapist through a local nonprofit.  Incidentally patient had a positive screen for ADHD.  Wondering if Wellbutrin would have a positive impact on some of the symptoms.  Holding off on adding medication for now.  Patient is going to try decreasing her paroxetine to 10 mg daily and follow-up in a month.    9/22/2023:  [employer] message says that she broke down, went gambling, and had a panic attack after decreasing her paroxetine to 10 mg.  Recommend staying on 20 mg.  Starting Wellbutrin 75 mg twice daily.      10/16/2023:   Chronic, controlled.   Feeling MUCH  Better.  Felt awful off paroxetine. Back on 20mg.  Feels a huge difference on wellbutrin 75mg BID.  Does feel like her feelings are a little dulled but she's ok with that.

## 2024-12-05 NOTE — ASSESSMENT & PLAN NOTE
Chronic, controlled.  Managed by rheumatology.   Diagnosed around 2000.  Dr Vang in [place].  Tolerating sulfasalazine and xeljanz (well controlled; notices when a dose is missed).  Previously on methotrexate.

## 2024-12-05 NOTE — ASSESSMENT & PLAN NOTE
Chronic, uncontrolled.   Tolerating levothyroxine 112mcg daily.  TSH 9.5 (9/2024).  Increasing to 125 mcg daily.    Positive TPO antibody and antithyroglobulin antibodies.

## 2024-12-05 NOTE — ASSESSMENT & PLAN NOTE
Interval history 9/2023:  Chronic, stable.  Patient was having issues with mood changes and was started on paroxetine around 2018.  She feels that these have somewhat stabilized and is thinking about coming off of the medication.  She will decrease paroxetine to 10 mg daily.

## 2025-01-14 ENCOUNTER — HOSPITAL ENCOUNTER (OUTPATIENT)
Dept: LAB | Facility: MEDICAL CENTER | Age: 60
End: 2025-01-14
Attending: INTERNAL MEDICINE
Payer: MEDICARE

## 2025-01-14 DIAGNOSIS — D64.9 ANEMIA, UNSPECIFIED TYPE: ICD-10-CM

## 2025-01-14 DIAGNOSIS — E55.9 VITAMIN D DEFICIENCY: ICD-10-CM

## 2025-01-14 DIAGNOSIS — E03.9 HYPOTHYROIDISM, UNSPECIFIED TYPE: ICD-10-CM

## 2025-01-14 LAB
25(OH)D3 SERPL-MCNC: 38 NG/ML (ref 30–100)
ALT SERPL-CCNC: 23 U/L (ref 2–50)
AST SERPL-CCNC: 28 U/L (ref 12–45)
BASOPHILS # BLD AUTO: 0.8 % (ref 0–1.8)
BASOPHILS # BLD AUTO: 1.2 % (ref 0–1.8)
BASOPHILS # BLD: 0.03 K/UL (ref 0–0.12)
BASOPHILS # BLD: 0.04 K/UL (ref 0–0.12)
CREAT SERPL-MCNC: 0.9 MG/DL (ref 0.5–1.4)
EOSINOPHIL # BLD AUTO: 0.22 K/UL (ref 0–0.51)
EOSINOPHIL # BLD AUTO: 0.24 K/UL (ref 0–0.51)
EOSINOPHIL NFR BLD: 6.5 % (ref 0–6.9)
EOSINOPHIL NFR BLD: 6.7 % (ref 0–6.9)
ERYTHROCYTE [DISTWIDTH] IN BLOOD BY AUTOMATED COUNT: 52.6 FL (ref 35.9–50)
ERYTHROCYTE [DISTWIDTH] IN BLOOD BY AUTOMATED COUNT: 52.9 FL (ref 35.9–50)
ERYTHROCYTE [SEDIMENTATION RATE] IN BLOOD BY WESTERGREN METHOD: 9 MM/HOUR (ref 0–25)
FERRITIN SERPL-MCNC: 128 NG/ML (ref 10–291)
GFR SERPLBLD CREATININE-BSD FMLA CKD-EPI: 73 ML/MIN/1.73 M 2
HCT VFR BLD AUTO: 38.2 % (ref 37–47)
HCT VFR BLD AUTO: 38.4 % (ref 37–47)
HGB BLD-MCNC: 12.4 G/DL (ref 12–16)
HGB BLD-MCNC: 12.5 G/DL (ref 12–16)
IMM GRANULOCYTES # BLD AUTO: 0.01 K/UL (ref 0–0.11)
IMM GRANULOCYTES # BLD AUTO: 0.01 K/UL (ref 0–0.11)
IMM GRANULOCYTES NFR BLD AUTO: 0.3 % (ref 0–0.9)
IMM GRANULOCYTES NFR BLD AUTO: 0.3 % (ref 0–0.9)
IRON SATN MFR SERPL: 38 % (ref 15–55)
IRON SERPL-MCNC: 105 UG/DL (ref 40–170)
LYMPHOCYTES # BLD AUTO: 0.62 K/UL (ref 1–4.8)
LYMPHOCYTES # BLD AUTO: 0.66 K/UL (ref 1–4.8)
LYMPHOCYTES NFR BLD: 18.4 % (ref 22–41)
LYMPHOCYTES NFR BLD: 18.5 % (ref 22–41)
MCH RBC QN AUTO: 31 PG (ref 27–33)
MCH RBC QN AUTO: 31.2 PG (ref 27–33)
MCHC RBC AUTO-ENTMCNC: 32.3 G/DL (ref 32.2–35.5)
MCHC RBC AUTO-ENTMCNC: 32.7 G/DL (ref 32.2–35.5)
MCV RBC AUTO: 95.3 FL (ref 81.4–97.8)
MCV RBC AUTO: 96 FL (ref 81.4–97.8)
MONOCYTES # BLD AUTO: 0.34 K/UL (ref 0–0.85)
MONOCYTES # BLD AUTO: 0.37 K/UL (ref 0–0.85)
MONOCYTES NFR BLD AUTO: 10.1 % (ref 0–13.4)
MONOCYTES NFR BLD AUTO: 10.4 % (ref 0–13.4)
NEUTROPHILS # BLD AUTO: 2.14 K/UL (ref 1.82–7.42)
NEUTROPHILS # BLD AUTO: 2.26 K/UL (ref 1.82–7.42)
NEUTROPHILS NFR BLD: 63.3 % (ref 44–72)
NEUTROPHILS NFR BLD: 63.5 % (ref 44–72)
NRBC # BLD AUTO: 0 K/UL
NRBC # BLD AUTO: 0 K/UL
NRBC BLD-RTO: 0 /100 WBC (ref 0–0.2)
NRBC BLD-RTO: 0 /100 WBC (ref 0–0.2)
PLATELET # BLD AUTO: 237 K/UL (ref 164–446)
PLATELET # BLD AUTO: 240 K/UL (ref 164–446)
PMV BLD AUTO: 10.3 FL (ref 9–12.9)
PMV BLD AUTO: 10.7 FL (ref 9–12.9)
RBC # BLD AUTO: 4 M/UL (ref 4.2–5.4)
RBC # BLD AUTO: 4.01 M/UL (ref 4.2–5.4)
TIBC SERPL-MCNC: 274 UG/DL (ref 250–450)
TRANSFERRIN SERPL-MCNC: 221 MG/DL (ref 200–370)
TSH SERPL-ACNC: 26.9 UIU/ML (ref 0.35–5.5)
UIBC SERPL-MCNC: 169 UG/DL (ref 110–370)
WBC # BLD AUTO: 3.4 K/UL (ref 4.8–10.8)
WBC # BLD AUTO: 3.6 K/UL (ref 4.8–10.8)

## 2025-01-14 PROCEDURE — 83540 ASSAY OF IRON: CPT

## 2025-01-14 PROCEDURE — 85025 COMPLETE CBC W/AUTO DIFF WBC: CPT

## 2025-01-14 PROCEDURE — 36415 COLL VENOUS BLD VENIPUNCTURE: CPT

## 2025-01-14 PROCEDURE — 82565 ASSAY OF CREATININE: CPT

## 2025-01-14 PROCEDURE — 84466 ASSAY OF TRANSFERRIN: CPT

## 2025-01-14 PROCEDURE — 84443 ASSAY THYROID STIM HORMONE: CPT

## 2025-01-14 PROCEDURE — 84450 TRANSFERASE (AST) (SGOT): CPT

## 2025-01-14 PROCEDURE — 84460 ALANINE AMINO (ALT) (SGPT): CPT

## 2025-01-14 PROCEDURE — 85025 COMPLETE CBC W/AUTO DIFF WBC: CPT | Mod: 91

## 2025-01-14 PROCEDURE — 83550 IRON BINDING TEST: CPT

## 2025-01-14 PROCEDURE — 82306 VITAMIN D 25 HYDROXY: CPT

## 2025-01-14 PROCEDURE — 82728 ASSAY OF FERRITIN: CPT

## 2025-01-14 PROCEDURE — 85652 RBC SED RATE AUTOMATED: CPT

## 2025-01-18 DIAGNOSIS — E03.9 HYPOTHYROIDISM, UNSPECIFIED TYPE: ICD-10-CM

## 2025-01-18 RX ORDER — LEVOTHYROXINE SODIUM 137 UG/1
137 TABLET ORAL
Qty: 90 TABLET | Refills: 1 | Status: SHIPPED | OUTPATIENT
Start: 2025-01-18

## 2025-03-24 ENCOUNTER — PATIENT MESSAGE (OUTPATIENT)
Dept: HEALTH INFORMATION MANAGEMENT | Facility: OTHER | Age: 60
End: 2025-03-24

## 2025-04-29 ENCOUNTER — PATIENT MESSAGE (OUTPATIENT)
Dept: MEDICAL GROUP | Facility: PHYSICIAN GROUP | Age: 60
End: 2025-04-29
Payer: MEDICARE

## 2025-05-01 RX ORDER — ALBUTEROL SULFATE 5 MG/ML
2.5 SOLUTION RESPIRATORY (INHALATION) EVERY 4 HOURS PRN
Qty: 75 ML | Refills: 1 | Status: SHIPPED
Start: 2025-05-01 | End: 2025-05-13

## 2025-05-04 DIAGNOSIS — R46.89 COMPULSIVE BEHAVIOR: ICD-10-CM

## 2025-05-07 RX ORDER — BUPROPION HYDROCHLORIDE 75 MG/1
75 TABLET ORAL 2 TIMES DAILY
Qty: 180 TABLET | Refills: 2 | Status: SHIPPED | OUTPATIENT
Start: 2025-05-07

## 2025-05-07 NOTE — TELEPHONE ENCOUNTER
Received request via: Pharmacy    Was the patient seen in the last year in this department? Yes    Does the patient have an active prescription (recently filled or refills available) for medication(s) requested? No    [facility]    Does the patient have MCFP Plus and need 100-day supply? (This applies to ALL medications) Yes, quantity updated to 100 days

## 2025-05-08 DIAGNOSIS — I10 PRIMARY HYPERTENSION: ICD-10-CM

## 2025-05-08 NOTE — TELEPHONE ENCOUNTER
Received request via: Pharmacy    Was the patient seen in the last year in this department? Yes    Does the patient have an active prescription (recently filled or refills available) for medication(s) requested? No    [facility]    Does the patient have alf Plus and need 100-day supply? (This applies to ALL medications) Yes, quantity updated to 100 days

## 2025-05-09 RX ORDER — LISINOPRIL AND HYDROCHLOROTHIAZIDE 12.5; 2 MG/1; MG/1
TABLET ORAL
Qty: 100 TABLET | Refills: 2 | Status: SHIPPED | OUTPATIENT
Start: 2025-05-09

## 2025-05-13 RX ORDER — ALBUTEROL SULFATE 0.83 MG/ML
2.5 SOLUTION RESPIRATORY (INHALATION) EVERY 4 HOURS PRN
Qty: 75 EACH | Refills: 1 | Status: SHIPPED | OUTPATIENT
Start: 2025-05-13

## 2025-07-30 ENCOUNTER — HOSPITAL ENCOUNTER (OUTPATIENT)
Dept: LAB | Facility: MEDICAL CENTER | Age: 60
End: 2025-07-30
Attending: INTERNAL MEDICINE
Payer: MEDICARE

## 2025-07-30 ENCOUNTER — HOSPITAL ENCOUNTER (OUTPATIENT)
Dept: LAB | Facility: MEDICAL CENTER | Age: 60
End: 2025-07-30
Attending: PHYSICIAN ASSISTANT
Payer: MEDICARE

## 2025-07-30 DIAGNOSIS — E03.9 HYPOTHYROIDISM, UNSPECIFIED TYPE: ICD-10-CM

## 2025-07-30 LAB
ALBUMIN SERPL BCP-MCNC: 4.3 G/DL (ref 3.2–4.9)
ALT SERPL-CCNC: 21 U/L (ref 2–50)
AST SERPL-CCNC: 22 U/L (ref 12–45)
BASOPHILS # BLD AUTO: 0.5 % (ref 0–1.8)
BASOPHILS # BLD: 0.02 K/UL (ref 0–0.12)
CREAT SERPL-MCNC: 0.87 MG/DL (ref 0.5–1.4)
EOSINOPHIL # BLD AUTO: 0.29 K/UL (ref 0–0.51)
EOSINOPHIL NFR BLD: 6.9 % (ref 0–6.9)
ERYTHROCYTE [DISTWIDTH] IN BLOOD BY AUTOMATED COUNT: 42.8 FL (ref 35.9–50)
ERYTHROCYTE [SEDIMENTATION RATE] IN BLOOD BY WESTERGREN METHOD: 12 MM/HOUR (ref 0–25)
GFR SERPLBLD CREATININE-BSD FMLA CKD-EPI: 76 ML/MIN/1.73 M 2
HCT VFR BLD AUTO: 37.7 % (ref 37–47)
HGB BLD-MCNC: 12.6 G/DL (ref 12–16)
IMM GRANULOCYTES # BLD AUTO: 0.01 K/UL (ref 0–0.11)
IMM GRANULOCYTES NFR BLD AUTO: 0.2 % (ref 0–0.9)
IRON SATN MFR SERPL: 33 % (ref 15–55)
IRON SERPL-MCNC: 72 UG/DL (ref 40–170)
LYMPHOCYTES # BLD AUTO: 0.72 K/UL (ref 1–4.8)
LYMPHOCYTES NFR BLD: 17.1 % (ref 22–41)
MCH RBC QN AUTO: 30.9 PG (ref 27–33)
MCHC RBC AUTO-ENTMCNC: 33.4 G/DL (ref 32.2–35.5)
MCV RBC AUTO: 92.4 FL (ref 81.4–97.8)
MONOCYTES # BLD AUTO: 0.35 K/UL (ref 0–0.85)
MONOCYTES NFR BLD AUTO: 8.3 % (ref 0–13.4)
NEUTROPHILS # BLD AUTO: 2.82 K/UL (ref 1.82–7.42)
NEUTROPHILS NFR BLD: 67 % (ref 44–72)
NRBC # BLD AUTO: 0 K/UL
NRBC BLD-RTO: 0 /100 WBC (ref 0–0.2)
PLATELET # BLD AUTO: 252 K/UL (ref 164–446)
PMV BLD AUTO: 10.8 FL (ref 9–12.9)
RBC # BLD AUTO: 4.08 M/UL (ref 4.2–5.4)
TIBC SERPL-MCNC: 221 UG/DL (ref 250–450)
TSH SERPL-ACNC: 0.09 UIU/ML (ref 0.38–5.33)
UIBC SERPL-MCNC: 149 UG/DL (ref 110–370)
WBC # BLD AUTO: 4.2 K/UL (ref 4.8–10.8)

## 2025-07-30 PROCEDURE — 36415 COLL VENOUS BLD VENIPUNCTURE: CPT

## 2025-07-30 PROCEDURE — 85025 COMPLETE CBC W/AUTO DIFF WBC: CPT

## 2025-07-30 PROCEDURE — 82565 ASSAY OF CREATININE: CPT

## 2025-07-30 PROCEDURE — 84450 TRANSFERASE (AST) (SGOT): CPT

## 2025-07-30 PROCEDURE — 82040 ASSAY OF SERUM ALBUMIN: CPT

## 2025-07-30 PROCEDURE — 84443 ASSAY THYROID STIM HORMONE: CPT

## 2025-07-30 PROCEDURE — 83550 IRON BINDING TEST: CPT

## 2025-07-30 PROCEDURE — 85652 RBC SED RATE AUTOMATED: CPT

## 2025-07-30 PROCEDURE — 83540 ASSAY OF IRON: CPT

## 2025-07-30 PROCEDURE — 84460 ALANINE AMINO (ALT) (SGPT): CPT

## 2025-08-26 RX ORDER — LEVOTHYROXINE SODIUM 137 UG/1
137 TABLET ORAL
Qty: 90 TABLET | Refills: 0 | Status: SHIPPED | OUTPATIENT
Start: 2025-08-26

## (undated) DEVICE — BAG, SPONGE COUNT 50600

## (undated) DEVICE — TUBE E-T HI-LO CUFF 7.0MM (10EA/PK)

## (undated) DEVICE — SUTURE GENERAL

## (undated) DEVICE — HEAD HOLDER JUNIOR/ADULT

## (undated) DEVICE — TUBING OUTFLOW HYSTEROSCPY (10EA/BX)

## (undated) DEVICE — SODIUM CHL IRRIGATION 0.9% 1000ML (12EA/CA)

## (undated) DEVICE — TUBE CONNECTING SUCTION - CLEAR PLASTIC STERILE 72 IN (50EA/CA)

## (undated) DEVICE — GLOVE BIOGEL SZ 8 SURGICAL PF LTX - (50PR/BX 4BX/CA)

## (undated) DEVICE — MASK ANESTHESIA ADULT  - (100/CA)

## (undated) DEVICE — PAD UNIVERSAL MULTI USE (1/EA)

## (undated) DEVICE — SOLUTION SORBITOL 3000ML (4/CA)

## (undated) DEVICE — SUTURE 1 PDS-2 PLUS CTX - (24/BX)

## (undated) DEVICE — ELECTRODE 850 FOAM ADHESIVE - HYDROGEL RADIOTRNSPRNT (50/PK)

## (undated) DEVICE — PROTECTOR ULNA NERVE - (36PR/CA)

## (undated) DEVICE — SUTURE 5 ETHIBOND V-37 (12PK/BX)

## (undated) DEVICE — GLOVE SZ 6 BIOGEL PI MICRO - PF LF (50PR/BX 4BX/CA)

## (undated) DEVICE — Device

## (undated) DEVICE — GLOVE BIOGEL SZ 7.5 SURGICAL PF LTX - (50PR/BX 4BX/CA)

## (undated) DEVICE — IMPLANT FLEXIBLE DRILL 35MM (1EA)

## (undated) DEVICE — NEPTUNE 4 PORT MANIFOLD - (20/PK)

## (undated) DEVICE — GLOVE BIOGEL INDICATOR SZ 7.5 SURGICAL PF LTX - (50PR/BX 4BX/CA)

## (undated) DEVICE — PAD SANITARY 11IN MAXI IND WRAPPED  (12EA/PK 24PK/CA)

## (undated) DEVICE — GLOVE BIOGEL PI INDICATOR SZ 6.5 SURGICAL PF LF - (50/BX 4BX/CA)

## (undated) DEVICE — HUMID-VENT HEAT AND MOISTURE EXCHANGE- (50/BX)

## (undated) DEVICE — SUTURE 3-0 MONOCRYL PLUS PS-1 - 27 INCH (36/BX)

## (undated) DEVICE — ELECTRODE DUAL RETURN W/ CORD - (50/PK)

## (undated) DEVICE — HANDPIECE 10FT INTPLS SCT PLS IRRIGATION HAND CONTROL SET (6/PK)

## (undated) DEVICE — BLADE SAGITTAL SAW DUAL CUT 75.0 X 25.0MM (1/EA)

## (undated) DEVICE — CANISTER SUCTION 3000ML MECHANICAL FILTER AUTO SHUTOFF MEDI-VAC NONSTERILE LF DISP  (40EA/CA)

## (undated) DEVICE — GLOVE BIOGEL INDICATOR SZ 7SURGICAL PF LTX - (50/BX 4BX/CA)

## (undated) DEVICE — KIT ANESTHESIA W/CIRCUIT & 3/LT BAG W/FILTER (20EA/CA)

## (undated) DEVICE — GOWN SURGEONS LARGE - (32/CA)

## (undated) DEVICE — LENS/HOOD FOR SPACESUIT - (32/PK) PEEL AWAY FACE

## (undated) DEVICE — KIT  I.V. START (100EA/CA)

## (undated) DEVICE — SENSOR SPO2 NEO LNCS ADHESIVE (20/BX) SEE USER NOTES

## (undated) DEVICE — CHLORAPREP 26 ML APPLICATOR - ORANGE TINT(25/CA)

## (undated) DEVICE — GLOVE, LITE (PAIR)

## (undated) DEVICE — SODIUM CHL. IRRIGATION 0.9% 3000ML (4EA/CA 65CA/PF)

## (undated) DEVICE — PAD BABY LAP 4X18 W/O - RINGS PREWASHED 5/PK 40PK/CS

## (undated) DEVICE — GLOVE BIOGEL SZ 6.5 SURGICAL PF LTX (50PR/BX 4BX/CA)

## (undated) DEVICE — DRESSING NON ADHERENT 3 X 4 - STERILE (100/BX 12BX/CA)

## (undated) DEVICE — PACK TOTAL HIP - (1/CA)

## (undated) DEVICE — GLOVE BIOGEL INDICATOR SZ 8 SURGICAL PF LTX - (50/BX 4BX/CA)

## (undated) DEVICE — TUBING INFLOW HYSTEROSCOPY (10EA/CA)

## (undated) DEVICE — CANISTER SUCTION RIGID RED 1500CC (40EA/CA)

## (undated) DEVICE — GOWN WARMING STANDARD FLEX - (30/CA)

## (undated) DEVICE — CATHETER IV 20 GA X 1-1/4 ---SURG.& SDS ONLY--- (50EA/BX)

## (undated) DEVICE — SUTURE 2-0 VICRYL PLUS CT-1 - 8 X 18 INCH(12/BX)

## (undated) DEVICE — SYSTEM PREVENA INCISION MNGM - (1/EA)

## (undated) DEVICE — GLOVE BIOGEL SZ 7 SURGICAL PF LTX - (50PR/BX 4BX/CA)

## (undated) DEVICE — SUCTION INSTRUMENT YANKAUER BULBOUS TIP W/O VENT (50EA/CA)

## (undated) DEVICE — KIT ROOM DECONTAMINATION

## (undated) DEVICE — LACTATED RINGERS INJ 1000 ML - (14EA/CA 60CA/PF)

## (undated) DEVICE — GLOVE BIOGEL INDICATOR SZ 8.5 SURGICAL PF LTX - (50/BX 4BX/CA)

## (undated) DEVICE — TUBING CLEARLINK DUO-VENT - C-FLO (48EA/CA)

## (undated) DEVICE — SET LEADWIRE 5 LEAD BEDSIDE DISPOSABLE ECG (1SET OF 5/EA)

## (undated) DEVICE — SUTURE 1 VICRYL PLUS CTX - 8 X 18 INCH (12/BX)

## (undated) DEVICE — WATER IRRIGATION STERILE 1000ML (12EA/CA)

## (undated) DEVICE — TRAY SRGPRP PVP IOD WT PRP - (20/CA)

## (undated) DEVICE — DRAPE UNDER BUTTOCKS FLUID - (20/CA)

## (undated) DEVICE — SET EXTENSION WITH 2 PORTS (48EA/CA) ***PART #2C8610 IS A SUBSTITUTE*****